# Patient Record
Sex: FEMALE | Race: WHITE | NOT HISPANIC OR LATINO | Employment: FULL TIME | URBAN - METROPOLITAN AREA
[De-identification: names, ages, dates, MRNs, and addresses within clinical notes are randomized per-mention and may not be internally consistent; named-entity substitution may affect disease eponyms.]

---

## 2017-02-10 ENCOUNTER — GYNECOLOGY VISIT (OUTPATIENT)
Dept: OBGYN | Facility: CLINIC | Age: 37
End: 2017-02-10
Payer: COMMERCIAL

## 2017-02-10 VITALS — WEIGHT: 110 LBS | DIASTOLIC BLOOD PRESSURE: 66 MMHG | SYSTOLIC BLOOD PRESSURE: 110 MMHG | BODY MASS INDEX: 20.8 KG/M2

## 2017-02-10 DIAGNOSIS — N93.8 DUB (DYSFUNCTIONAL UTERINE BLEEDING): ICD-10-CM

## 2017-02-10 DIAGNOSIS — E28.2 PCOS (POLYCYSTIC OVARIAN SYNDROME): ICD-10-CM

## 2017-02-10 PROCEDURE — 76830 TRANSVAGINAL US NON-OB: CPT | Performed by: OBSTETRICS & GYNECOLOGY

## 2017-02-10 PROCEDURE — 99213 OFFICE O/P EST LOW 20 MIN: CPT | Mod: 25 | Performed by: OBSTETRICS & GYNECOLOGY

## 2017-02-10 NOTE — PROGRESS NOTES
Felisha Pedraza,  36 y.o.  female presents today with a C/O of :oligomenorrhea. Pt   Patient's last menstrual period was 2016.       Patient with prior pregnancy ,required Dr. Jackson and IUI, Meds . Patient seen by Dr. ENGLE and referred back , but with Positive pregnancy test  Before      Subjective : Nausea/Vomiting: No:  Abdominal /pelvic cramping : No :   vaginal bleeding:Yes, some Brownish D/C       Menstrual Flow : moderate   GYN ROS:  normal menses, no abnormal bleeding, pelvic pain or discharge, no breast pain or new or enlarging lumps on self exam      Past Medical History   Diagnosis Date   • Other specified symptom associated with female genital organs    • PCOS (polycystic ovarian syndrome)    • Migraine        Past Surgical History   Procedure Laterality Date   • Other orthopedic surgery       clavical right   • Pelviscopy  4/3/2013     Performed by Shin Mayo M.D. at [facility]   • Cyst excision  4/3/2013     Performed by Shin Mayo M.D. at [facility]            C/S in [place] : FTP   Current Birth control:  none    OB History    Para Term  AB SAB TAB Ectopic Multiple Living   1 1 1       1      # Outcome Date GA Lbr Gideon/2nd Weight Sex Delivery Anes PTL Lv   1 Term 08/03/15    F CS-Unspec   Y              Allergy:      Review of patient's allergies indicates no known allergies.    Exam;    /66 mmHg  Wt 49.896 kg (110 lb)  LMP 2016  Breastfeeding? No      Lab.    No results found for this or any previous visit (from the past 336 hour(s)).  Ultrasound :      Per my Read   Transvaginal    First trimester findings: Intrauterine gestational sac seen: yes  Gestational sac summary: fetal pole seen, yolk sac seen, fetal cardiac activity, EGA: 9 weeks + 1 days  Fetal cardiac activity: present  Crown-rump length: 2.19 cm  BARBIE : 2017    Assessment:    missed/threatened   Hx of PCOS , and required IUI and  meds with last pregnancy  Possible persistent hormonal imbalance : no CLC seen     Plan:  2 weeks   Serum Progesterone level/ HCG   Doubt will need , but script for progesterone given , if levels < 15   Referral for Aye   Patient to follow up with all group members

## 2017-02-10 NOTE — MR AVS SNAPSHOT
Felisha Pedraza   2/10/2017 1:30 PM   Gynecology Visit   MRN: [id number]    Department:  [facility]   Dept Phone:  [phone]    Description:  Female : 1980   Provider:  Ron Esquivel M.D.           Reason for Visit     Other DUB      Allergies as of 2/10/2017     No Known Allergies      You were diagnosed with     DUB (dysfunctional uterine bleeding)   [036293]       PCOS (polycystic ovarian syndrome)   [458362]         Vital Signs     Blood Pressure Weight Last Menstrual Period Breastfeeding? Smoking Status       110/66 mmHg 49.896 kg (110 lb) 2016 No Former Smoker       Basic Information     Date Of Birth Sex Race Ethnicity Preferred Language    1980 Female White Non- English      Your appointments     Mar 24, 2017  1:00 PM   New OB with Sara Guzman M.D.   [facility] ([address])    [address] [place] [address] [phone]            2017  3:45 PM   OB Follow Up with Sara Guzman M.D.   [facility] ([address])    [address] [place] [address], [address]  [facility] [phone]            May 23, 2017  3:45 PM   OB Follow Up with Ron Esquivel M.D.   [facility] ([address])    [address] [place] [address], [address]  [facility] [phone]            2017  9:00 AM   OB Follow Up with Darlyn Packer M.D.   [facility] ([address])    [address] [place] [address], [address]  [facility] [id number] [phone]              Problem List              ICD-10-CM Priority Class Noted - Resolved    PCOS (polycystic ovarian syndrome) E28.2   2012 - Present    Opiate dependence (CMS-HCC) F11.20   2012 - Present    Irregular menstrual cycle N92.6   4/3/2013 - Present      Health Maintenance        Date Due Completion Dates    IMM DTaP/Tdap/Td Vaccine (1 - Tdap) 2001 ---    IMM INFLUENZA (1) 2016 ---    PAP SMEAR  12/5/2019 12/5/2016            Current Immunizations     No immunizations on file.      Below and/or attached are the medications your provider expects you to take. Review all of your home medications and newly ordered medications with your provider and/or pharmacist. Follow medication instructions as directed by your provider and/or pharmacist. Please keep your medication list with you and share with your provider. Update the information when medications are discontinued, doses are changed, or new medications (including over-the-counter products) are added; and carry medication information at all times in the event of emergency situations     Allergies:  No Known Allergies          Medications  Valid as of: February 10, 2017 -  2:49 PM    Generic Name Brand Name Tablet Size Instructions for use    Buprenorphine HCl-Naloxone HCl (FILM) Buprenorphine HCl-Naloxone HCl 8-2 MG Place 4 mg under tongue every day. DRUG ADDICTION AGENCY # [id number]    [facility] #  [id number]        Doxylamine Succinate (Sleep) (Tab) UNISOM 25 MG Take  by mouth. 1/2 tab prn insomnia        Ibuprofen (Tab) MOTRIN 800 MG Take 1 Tab by mouth every 8 hours as needed.        MetFORMIN HCl (Tab) GLUCOPHAGE 500 MG Take 1 Tab by mouth 2 times a day, with meals. Indications: Polycystic Ovary Syndrome        Norethindrone   Take  by mouth.        Prenatal Vit-Fe Fumarate-FA (Tab) PRENATAL VITAMIN PLUS LOW IRON 27-1 MG Take 1 Tab by mouth every day.        Progesterone (Suppos) Progesterone 100 MG Insert 1 Suppository in vagina 2 Times a Day.        .                 Medicines prescribed today were sent to:     [id number] - [place] - [address]    [address] [place] [address]    Phone: [phone] Fax: [phone]    Open 24 Hours?: No      Medication refill instructions:       If your prescription bottle indicates you have medication refills left, it is not necessary to call your provider’s office. Please contact your pharmacy and they will  refill your medication.    If your prescription bottle indicates you do not have any refills left, you may request refills at any time through one of the following ways: The online [employer] system (except Urgent Care), by calling your provider’s office, or by asking your pharmacy to contact your provider’s office with a refill request. Medication refills are processed only during regular business hours and may not be available until the next business day. Your provider may request additional information or to have a follow-up visit with you prior to refilling your medication.   *Please Note: Medication refills are assigned a new Rx number when refilled electronically. Your pharmacy may indicate that no refills were authorized even though a new prescription for the same medication is available at the pharmacy. Please request the medicine by name with the pharmacy before contacting your provider for a refill.        Your To Do List     Future Labs/Procedures Complete By Expires    HCG QUANTITATIVE  As directed 2/10/2018      Referral     A referral request has been sent to our patient care coordination department. Please allow 3-5 business days for us to process this request and contact you either by phone or mail. If you do not hear from us by the 5th business day, please call us at [phone].           [employer] Access Code: Activation code not generated  Current [employer] Status: Active

## 2017-02-13 ENCOUNTER — HOSPITAL ENCOUNTER (OUTPATIENT)
Dept: LAB | Facility: MEDICAL CENTER | Age: 37
End: 2017-02-13
Attending: OBSTETRICS & GYNECOLOGY
Payer: COMMERCIAL

## 2017-02-13 DIAGNOSIS — N93.8 DUB (DYSFUNCTIONAL UTERINE BLEEDING): ICD-10-CM

## 2017-02-13 LAB
B-HCG SERPL-ACNC: ABNORMAL MIU/ML (ref 0–5)
PROGEST SERPL-MCNC: 29.25 NG/ML

## 2017-02-13 PROCEDURE — 84144 ASSAY OF PROGESTERONE: CPT

## 2017-02-13 PROCEDURE — 36415 COLL VENOUS BLD VENIPUNCTURE: CPT

## 2017-02-13 PROCEDURE — 84702 CHORIONIC GONADOTROPIN TEST: CPT

## 2017-02-15 ENCOUNTER — TELEPHONE (OUTPATIENT)
Dept: OBGYN | Facility: CLINIC | Age: 37
End: 2017-02-15

## 2017-02-16 NOTE — TELEPHONE ENCOUNTER
Pt called back   Informed her of results & no need for supplemental progesterone   Pt verbalized understanding

## 2017-02-16 NOTE — TELEPHONE ENCOUNTER
----- Message from Ron Esquivel M.D. sent at 2/15/2017  9:10 AM PST -----  Levels are veery good . No need for supplemental progesterone at this time

## 2017-03-07 ENCOUNTER — HOSPITAL ENCOUNTER (OUTPATIENT)
Dept: LAB | Facility: MEDICAL CENTER | Age: 37
End: 2017-03-07
Attending: OBSTETRICS & GYNECOLOGY
Payer: COMMERCIAL

## 2017-03-07 PROCEDURE — 81507 FETAL ANEUPLOIDY TRISOM RISK: CPT

## 2017-03-07 PROCEDURE — 36415 COLL VENOUS BLD VENIPUNCTURE: CPT

## 2017-03-17 LAB
ANNOTATION COMMENT IMP: NORMAL
FET CHR X + Y ANEUP RISK PLAS.CFDNA QN: NORMAL
FET SEX US: NORMAL
FET TS 13 RISK PLAS.CFDNA QL: NORMAL
FET TS 18 RISK PLAS.CFDNA QL: NORMAL
FET TS 21 RISK PLAS.CFDNA QL: NORMAL
FETAL FRACTION Q0204: 14.5
GA (DAYS): 4 D
GA (WEEKS): 12 WK
PATHOLOGY STUDY: NORMAL
SERVICE CMNT-IMP: YES
TRIPLOIDY VAN TWIN Q0202: NORMAL

## 2017-03-24 ENCOUNTER — HOSPITAL ENCOUNTER (OUTPATIENT)
Dept: LAB | Facility: MEDICAL CENTER | Age: 37
End: 2017-03-24
Attending: OBSTETRICS & GYNECOLOGY
Payer: COMMERCIAL

## 2017-03-24 ENCOUNTER — HOSPITAL ENCOUNTER (OUTPATIENT)
Facility: MEDICAL CENTER | Age: 37
End: 2017-03-24
Attending: OBSTETRICS & GYNECOLOGY
Payer: COMMERCIAL

## 2017-03-24 ENCOUNTER — INITIAL PRENATAL (OUTPATIENT)
Dept: OBGYN | Facility: CLINIC | Age: 37
End: 2017-03-24
Payer: COMMERCIAL

## 2017-03-24 VITALS — SYSTOLIC BLOOD PRESSURE: 110 MMHG | WEIGHT: 114 LBS | BODY MASS INDEX: 21.55 KG/M2 | DIASTOLIC BLOOD PRESSURE: 62 MMHG

## 2017-03-24 DIAGNOSIS — O09.92 HIGH-RISK PREGNANCY, SECOND TRIMESTER: ICD-10-CM

## 2017-03-24 DIAGNOSIS — O09.522 AMA (ADVANCED MATERNAL AGE) MULTIGRAVIDA 35+, SECOND TRIMESTER: ICD-10-CM

## 2017-03-24 DIAGNOSIS — O98.512 HSV-2 INFECTION COMPLICATING PREGNANCY, SECOND TRIMESTER: ICD-10-CM

## 2017-03-24 DIAGNOSIS — O34.219 PREVIOUS CESAREAN DELIVERY AFFECTING PREGNANCY, ANTEPARTUM: ICD-10-CM

## 2017-03-24 DIAGNOSIS — B00.9 HSV-2 INFECTION COMPLICATING PREGNANCY, SECOND TRIMESTER: ICD-10-CM

## 2017-03-24 LAB
ABO GROUP BLD: NORMAL
APPEARANCE UR: CLEAR
BASOPHILS # BLD AUTO: 0.08 K/UL (ref 0–0.12)
BASOPHILS NFR BLD AUTO: 0.9 % (ref 0–1.8)
BILIRUB UR QL STRIP.AUTO: NEGATIVE
BLD GP AB SCN SERPL QL: NORMAL
COLOR UR AUTO: YELLOW
CULTURE IF INDICATED INDCX: NO UA CULTURE
EOSINOPHIL # BLD: 0.08 K/UL (ref 0–0.51)
EOSINOPHIL NFR BLD AUTO: 0.9 % (ref 0–6.9)
ERYTHROCYTE [DISTWIDTH] IN BLOOD BY AUTOMATED COUNT: 42.5 FL (ref 35.9–50)
GLUCOSE UR STRIP.AUTO-MCNC: NEGATIVE MG/DL
HBV SURFACE AG SERPL QL IA: NEGATIVE
HCT VFR BLD AUTO: 36.2 % (ref 37–47)
HGB BLD-MCNC: 11.9 G/DL (ref 12–16)
HIV 1+2 AB+HIV1 P24 AG SERPL QL IA: NON REACTIVE
IMM GRANULOCYTES # BLD AUTO: 0.03 K/UL (ref 0–0.11)
IMM GRANULOCYTES NFR BLD AUTO: 0.3 % (ref 0–0.9)
KETONES UR STRIP.AUTO-MCNC: NEGATIVE MG/DL
LEUKOCYTE ESTERASE UR QL STRIP.AUTO: NEGATIVE
LYMPHOCYTES # BLD: 1.24 K/UL (ref 1–4.8)
LYMPHOCYTES NFR BLD AUTO: 14.5 % (ref 22–41)
MCH RBC QN AUTO: 29.5 PG (ref 27–33)
MCHC RBC AUTO-ENTMCNC: 32.9 G/DL (ref 33.6–35)
MCV RBC AUTO: 89.6 FL (ref 81.4–97.8)
MICRO URNS: ABNORMAL
MONOCYTES # BLD: 0.44 K/UL (ref 0–0.85)
MONOCYTES NFR BLD AUTO: 5.1 % (ref 0–13.4)
NEUTROPHILS # BLD: 6.71 K/UL (ref 2–7.15)
NEUTROPHILS NFR BLD AUTO: 78.3 % (ref 44–72)
NITRITE UR QL STRIP.AUTO: NEGATIVE
NRBC # BLD AUTO: 0 K/UL
NRBC BLD-RTO: 0 /100 WBC
PH UR: 6 [PH]
PLATELET # BLD AUTO: 182 K/UL (ref 164–446)
PMV BLD AUTO: 12.9 FL (ref 9–12.9)
PROT UR QL STRIP: NEGATIVE MG/DL
RBC # BLD AUTO: 4.04 M/UL (ref 4.2–5.4)
RBC UR QL AUTO: NEGATIVE
RH BLD: NORMAL
RUBV IGG SERPL IA-ACNC: 225.5 IU/ML
SP GR UR STRIP.AUTO: 1.02
TREPONEMA PALLIDUM IGG+IGM AB [PRESENCE] IN SERUM OR PLASMA BY IMMUNOASSAY: NON REACTIVE
WBC # BLD AUTO: 8.6 K/UL (ref 4.8–10.8)

## 2017-03-24 PROCEDURE — 86901 BLOOD TYPING SEROLOGIC RH(D): CPT

## 2017-03-24 PROCEDURE — 86762 RUBELLA ANTIBODY: CPT

## 2017-03-24 PROCEDURE — 86900 BLOOD TYPING SEROLOGIC ABO: CPT

## 2017-03-24 PROCEDURE — 59402 PR NEW OB HIGH RISK: CPT | Performed by: OBSTETRICS & GYNECOLOGY

## 2017-03-24 PROCEDURE — 87591 N.GONORRHOEAE DNA AMP PROB: CPT

## 2017-03-24 PROCEDURE — 36415 COLL VENOUS BLD VENIPUNCTURE: CPT

## 2017-03-24 PROCEDURE — 85025 COMPLETE CBC W/AUTO DIFF WBC: CPT

## 2017-03-24 PROCEDURE — 87340 HEPATITIS B SURFACE AG IA: CPT

## 2017-03-24 PROCEDURE — 87389 HIV-1 AG W/HIV-1&-2 AB AG IA: CPT

## 2017-03-24 PROCEDURE — 86780 TREPONEMA PALLIDUM: CPT

## 2017-03-24 PROCEDURE — 87591 N.GONORRHOEAE DNA AMP PROB: CPT | Mod: 91

## 2017-03-24 PROCEDURE — 87491 CHLMYD TRACH DNA AMP PROBE: CPT

## 2017-03-24 PROCEDURE — 81003 URINALYSIS AUTO W/O SCOPE: CPT

## 2017-03-24 PROCEDURE — 86850 RBC ANTIBODY SCREEN: CPT

## 2017-03-24 PROCEDURE — 87491 CHLMYD TRACH DNA AMP PROBE: CPT | Mod: 91

## 2017-03-24 RX ORDER — ACYCLOVIR 400 MG/1
400 TABLET ORAL 3 TIMES DAILY
Qty: 30 TAB | Refills: 0 | Status: SHIPPED | OUTPATIENT
Start: 2017-03-24 | End: 2018-03-26 | Stop reason: SDUPTHER

## 2017-03-24 NOTE — MR AVS SNAPSHOT
Felisha Pedraza   3/24/2017 1:00 PM   Initial Prenatal   MRN: [id number]    Department:  [facility]   Dept Phone:  [phone]    Description:  Female : 1980   Provider:  Sara Guzman M.D.           Allergies as of 3/24/2017     No Known Allergies      Vital Signs     Blood Pressure Weight Last Menstrual Period Smoking Status          110/62 mmHg 51.71 kg (114 lb) 2016 Former Smoker        Basic Information     Date Of Birth Sex Race Ethnicity Preferred Language    1980 Female White Non- English      Your appointments     2017  3:45 PM   OB Follow Up with Sara Guzman M.D.   [facility] ([address])    [address] [place] [phone]            May 23, 2017  3:45 PM   OB Follow Up with Ron Esquivel M.D.   [facility] ([address])    [address] [place] [address], [address] [phone]            2017  9:00 AM   OB Follow Up with Darlyn Packer M.D.   [facility] ([address])    [address] [place] [address] [phone]              Problem List              ICD-10-CM Priority Class Noted - Resolved    PCOS (polycystic ovarian syndrome) E28.2   2012 - Present      Health Maintenance        Date Due Completion Dates    IMM DTaP/Tdap/Td Vaccine (1 - Tdap) 2001 ---    IMM INFLUENZA (1) 2016 ---    PAP SMEAR 2019            Current Immunizations     No immunizations on file.      Below and/or attached are the medications your provider expects you to take. Review all of your home medications and newly ordered medications with your provider and/or pharmacist. Follow medication instructions as directed by your provider and/or pharmacist. Please keep your medication list with you and share with your provider. Update the information when medications are discontinued, doses are changed,  or new medications (including over-the-counter products) are added; and carry medication information at all times in the event of emergency situations     Allergies:  No Known Allergies          Medications  Valid as of: March 24, 2017 -  1:26 PM    Generic Name Brand Name Tablet Size Instructions for use    Buprenorphine HCl-Naloxone HCl (FILM) Buprenorphine HCl-Naloxone HCl 8-2 MG Place 4 mg under tongue every day. DRUG ADDICTION AGENCY # [id number]    [facility]  [id number]        Doxylamine Succinate (Sleep) (Tab) UNISOM 25 MG Take  by mouth. 1/2 tab prn insomnia        Ibuprofen (Tab) MOTRIN 800 MG Take 1 Tab by mouth every 8 hours as needed.        MetFORMIN HCl (Tab) GLUCOPHAGE 500 MG Take 1 Tab by mouth 2 times a day, with meals. Indications: Polycystic Ovary Syndrome        Norethindrone   Take  by mouth.        Prenatal Vit-Fe Fumarate-FA (Tab) PRENATAL VITAMIN PLUS LOW IRON 27-1 MG Take 1 Tab by mouth every day.        Progesterone (Suppos) Progesterone 100 MG Insert 1 Suppository in vagina 2 Times a Day.        .                 Medicines prescribed today were sent to:     [id number] - [place] - [address]    [address] [facility] [address]    Phone: [phone] Fax: [phone]    Open 24 Hours?: No      Medication refill instructions:       If your prescription bottle indicates you have medication refills left, it is not necessary to call your provider’s office. Please contact your pharmacy and they will refill your medication.    If your prescription bottle indicates you do not have any refills left, you may request refills at any time through one of the following ways: The online [employer] system (except Urgent Care), by calling your provider’s office, or by asking your pharmacy to contact your provider’s office with a refill request. Medication refills are processed only during regular business hours and may not be available until the next business day. Your provider may request additional  information or to have a follow-up visit with you prior to refilling your medication.   *Please Note: Medication refills are assigned a new Rx number when refilled electronically. Your pharmacy may indicate that no refills were authorized even though a new prescription for the same medication is available at the pharmacy. Please request the medicine by name with the pharmacy before contacting your provider for a refill.        Other Notes About Your Plan     Early pregnancy : Unassigned , but will be [facility] group patient            MyChart Access Code: Activation code not generated  Current MyChart Status: Active

## 2017-03-24 NOTE — Clinical Note
2017      Felisha Pedraza is currently pregnant and being cared for by [facility].     She is medically cleared for:    1. Dental exams and routine cleaning  2. Tooth fillings and extractions as needed  3. Antibiotic therapy as appropriate  4. Local anesthesia    Patient may be administered the followin% Lidocaine with 1:100,000 Epinephrine  4% Septocaine with 1:100,000 Epinephrine  Nitrous Oxide  A narcotic or non-narcotic pain medication  An antibiotic such as penicillin or clindamycin    NO TETRACYCLINE and NO CODEINE        Thank you,          Sara Guzman M.D.    Electronically Signed

## 2017-03-24 NOTE — PROGRESS NOTES
Subjective:      Felisha Pedraza is a 36 y.o. female who presents with No chief complaint on file.        CC: NOB    HPI 35 yo  @ 15+1/7 wks by lmp 16, BARBIE, 9/15/17 consistent with 9 +1/7 wk sono presents for NOB exam.  No complaints today.       complications:  1. AMA - seen by Dr. Griffiths, Cell free DNA negative  2. Hx of  - desires repeat  3. Hx of HSV with frequent outbreaks  4. PCOS - on metformin 500 mg po bid  5. Hx of opioid dependence - clean for 5 years  6. Maternal Antoine Sach's carrier,  negative.    ROS       Objective:     /62 mmHg  Wt 51.71 kg (114 lb)  LMP 2016     Physical Exam     See prenatal physical       Assessment/Plan:    High-risk pregnancy, second trimester    - PRENATAL PANEL 3+HIV+UACXI; Future  - CHLAMYDIA/GC PCR URINE OR SWAB; Future (Pap done already, normal)  Had flu shot.  Continue PNV.  CF negative.  Has fetal survey scheduled with Dr. Griffiths.    1. AMA - seen by Dr. Griffiths, Cell free DNA negative  2. Hx of  - desires repeat  3. Hx of HSV with frequent outbreaks - pt requests acyclovir for standby.    4. PCOS - on metformin 500 mg po bid  5. Hx of opioid dependence - clean for 5 years  6. Maternal Antoine Sach's carrier,  negative.    F/U 4 weeks.

## 2017-03-24 NOTE — PROGRESS NOTES
Pt here for NOB. No fetal movement yet. Denies LOF, VB.  Aye 3/7- f/u sched for full anatomy scan  NIPT done  Pap 12/2016 WNL- GC/CT today  Need PNP  Pt requesting dental clearance

## 2017-03-25 LAB
C TRACH DNA GENITAL QL NAA+PROBE: NEGATIVE
C TRACH DNA GENITAL QL NAA+PROBE: NEGATIVE
N GONORRHOEA DNA GENITAL QL NAA+PROBE: NEGATIVE
N GONORRHOEA DNA GENITAL QL NAA+PROBE: NEGATIVE
SPECIMEN SOURCE: NORMAL
SPECIMEN SOURCE: NORMAL

## 2017-04-25 ENCOUNTER — ROUTINE PRENATAL (OUTPATIENT)
Dept: OBGYN | Facility: CLINIC | Age: 37
End: 2017-04-25
Payer: COMMERCIAL

## 2017-04-25 VITALS — DIASTOLIC BLOOD PRESSURE: 62 MMHG | SYSTOLIC BLOOD PRESSURE: 108 MMHG | WEIGHT: 119 LBS | BODY MASS INDEX: 22.5 KG/M2

## 2017-04-25 DIAGNOSIS — E28.2 PCOS (POLYCYSTIC OVARIAN SYNDROME): ICD-10-CM

## 2017-04-25 DIAGNOSIS — O09.522 AMA (ADVANCED MATERNAL AGE) MULTIGRAVIDA 35+, SECOND TRIMESTER: ICD-10-CM

## 2017-04-25 DIAGNOSIS — B00.9 HSV INFECTION: ICD-10-CM

## 2017-04-25 DIAGNOSIS — O34.219 PREVIOUS CESAREAN SECTION COMPLICATING PREGNANCY: ICD-10-CM

## 2017-04-25 PROBLEM — O09.529 AMA (ADVANCED MATERNAL AGE) MULTIGRAVIDA 35+: Status: ACTIVE | Noted: 2017-04-25

## 2017-04-25 PROCEDURE — 90040 PR PRENATAL FOLLOW UP: CPT | Performed by: OBSTETRICS & GYNECOLOGY

## 2017-04-25 NOTE — PROGRESS NOTES
Pt here for OB F/V. Good fetal movement. Denies LOF, VB.  GC NEG  PNP done  Aye report 4/13 in [place]  [place] dominga sweeney

## 2017-04-25 NOTE — PROGRESS NOTES
"36 y.o.  19w4d The patient is here for routine obstetrical followup. She reports good fetal movement. She denies vaginal bleeding, or leaking of fluid.  C/O intermittent \"[place]\" contractions - 1-2 per day.    The patient's pregnancy is complicated by   Patient Active Problem List    Diagnosis Date Noted   • AMA (advanced maternal age) multigravida 35+ 2017   • Previous  section complicating pregnancy 2017   • HSV infection 2017   • PCOS (polycystic ovarian syndrome) 2012     Fetal survey and prenatal labs reviewed - normal.    Followup in 4 weeks   labor precautions were discussed with patient    "

## 2017-04-25 NOTE — MR AVS SNAPSHOT
Felisha Pedraza   2017 3:45 PM   Routine Prenatal   MRN: [id number]    Department:  [facility]   Dept Phone:  [phone]    Description:  Female : 1980   Provider:  Sara Guzman M.D.           Allergies as of 2017     No Known Allergies      Vital Signs     Blood Pressure Weight Last Menstrual Period Smoking Status          108/62 mmHg 53.978 kg (119 lb) 2016 Former Smoker        Basic Information     Date Of Birth Sex Race Ethnicity Preferred Language    1980 Female White Non- English      Your appointments     May 23, 2017  3:45 PM   OB Follow Up with Ron Esquivel M.D.   [facility] ([address])    [address] [place] [address] [phone]            2017  9:00 AM   OB Follow Up with Darlyn Packer M.D.   [facility] ([address])    [address] [place] [address] [id number] [phone]              Problem List              ICD-10-CM Priority Class Noted - Resolved    PCOS (polycystic ovarian syndrome) E28.2   2012 - Present      Health Maintenance        Date Due Completion Dates    IMM DTaP/Tdap/Td Vaccine (1 - Tdap) 2001 ---    PAP SMEAR 2019            Current Immunizations     No immunizations on file.      Below and/or attached are the medications your provider expects you to take. Review all of your home medications and newly ordered medications with your provider and/or pharmacist. Follow medication instructions as directed by your provider and/or pharmacist. Please keep your medication list with you and share with your provider. Update the information when medications are discontinued, doses are changed, or new medications (including over-the-counter products) are added; and carry medication information at all times in the event of emergency situations     Allergies:  No Known Allergies          Medications  Valid as of:  April 25, 2017 -  4:10 PM    Generic Name Brand Name Tablet Size Instructions for use    Acyclovir (Tab) ZOVIRAX 400 MG Take 1 Tab by mouth 3 times a day.        Buprenorphine HCl-Naloxone HCl (FILM) Buprenorphine HCl-Naloxone HCl 8-2 MG Place 4 mg under tongue every day. DRUG ADDICTION AGENCY # [id number]    [facility] #  [id number]        Doxylamine Succinate (Sleep) (Tab) UNISOM 25 MG Take  by mouth. 1/2 tab prn insomnia        Ibuprofen (Tab) MOTRIN 800 MG Take 1 Tab by mouth every 8 hours as needed.        MetFORMIN HCl (Tab) GLUCOPHAGE 500 MG Take 1 Tab by mouth 2 times a day, with meals. Indications: Polycystic Ovary Syndrome        Norethindrone   Take  by mouth.        Prenatal Vit-Fe Fumarate-FA (Tab) PRENATAL VITAMIN PLUS LOW IRON 27-1 MG Take 1 Tab by mouth every day.        Progesterone (Suppos) Progesterone 100 MG Insert 1 Suppository in vagina 2 Times a Day.        .                 Medicines prescribed today were sent to:     [id number] - [place], [place] - [address]    [address] [place] [address]    Phone: [phone] Fax: [phone]    Open 24 Hours?: No      Medication refill instructions:       If your prescription bottle indicates you have medication refills left, it is not necessary to call your provider’s office. Please contact your pharmacy and they will refill your medication.    If your prescription bottle indicates you do not have any refills left, you may request refills at any time through one of the following ways: The online [employer] system (except Urgent Care), by calling your provider’s office, or by asking your pharmacy to contact your provider’s office with a refill request. Medication refills are processed only during regular business hours and may not be available until the next business day. Your provider may request additional information or to have a follow-up visit with you prior to refilling your medication.   *Please Note: Medication refills are assigned a new Rx number when  refilled electronically. Your pharmacy may indicate that no refills were authorized even though a new prescription for the same medication is available at the pharmacy. Please request the medicine by name with the pharmacy before contacting your provider for a refill.        Other Notes About Your Plan     Early pregnancy : Unassigned , but will be [facility] group patient            MyChart Access Code: Activation code not generated  Current MyChart Status: Active

## 2017-06-20 ENCOUNTER — HOSPITAL ENCOUNTER (OUTPATIENT)
Dept: LAB | Facility: MEDICAL CENTER | Age: 37
End: 2017-06-20
Attending: OBSTETRICS & GYNECOLOGY
Payer: COMMERCIAL

## 2017-06-20 ENCOUNTER — ROUTINE PRENATAL (OUTPATIENT)
Dept: OBGYN | Facility: CLINIC | Age: 37
End: 2017-06-20
Payer: COMMERCIAL

## 2017-06-20 VITALS — BODY MASS INDEX: 23.82 KG/M2 | WEIGHT: 126 LBS | DIASTOLIC BLOOD PRESSURE: 66 MMHG | SYSTOLIC BLOOD PRESSURE: 110 MMHG

## 2017-06-20 DIAGNOSIS — O34.219 PREVIOUS CESAREAN SECTION COMPLICATING PREGNANCY: ICD-10-CM

## 2017-06-20 DIAGNOSIS — Z34.83 PRENATAL CARE, SUBSEQUENT PREGNANCY, THIRD TRIMESTER: ICD-10-CM

## 2017-06-20 LAB
GLUCOSE 1H P 50 G GLC PO SERPL-MCNC: 75 MG/DL (ref 70–139)
HCT VFR BLD AUTO: 35.9 % (ref 37–47)
HGB BLD-MCNC: 11.6 G/DL (ref 12–16)
TREPONEMA PALLIDUM IGG+IGM AB [PRESENCE] IN SERUM OR PLASMA BY IMMUNOASSAY: NON REACTIVE

## 2017-06-20 PROCEDURE — 85018 HEMOGLOBIN: CPT

## 2017-06-20 PROCEDURE — 85014 HEMATOCRIT: CPT

## 2017-06-20 PROCEDURE — 36415 COLL VENOUS BLD VENIPUNCTURE: CPT

## 2017-06-20 PROCEDURE — 86780 TREPONEMA PALLIDUM: CPT

## 2017-06-20 PROCEDURE — 90040 PR PRENATAL FOLLOW UP: CPT | Performed by: OBSTETRICS & GYNECOLOGY

## 2017-06-20 PROCEDURE — 82950 GLUCOSE TEST: CPT

## 2017-06-20 NOTE — MR AVS SNAPSHOT
Felisha Pedraza   2017 9:00 AM   Routine Prenatal   MRN: [id number]    Department:  [facility]   Dept Phone:  [phone]    Description:  Female : 1980   Provider:  Darlyn Packer M.D.           Allergies as of 2017     No Known Allergies      You were diagnosed with     Prenatal care, subsequent pregnancy, third trimester   [206431]       Previous  section complicating pregnancy   [748437]         Vital Signs     Blood Pressure Weight Last Menstrual Period Smoking Status          110/66 mmHg 57.153 kg (126 lb) 2016 Former Smoker        Basic Information     Date Of Birth Sex Race Ethnicity Preferred Language    1980 Female White Non- English      Your appointments     2017  3:00 PM   OB Follow Up with Darlyn Packer M.D.   [facility] ([address])    [address] [place] [address], [address]  [facility] [id number] [phone]            2017 10:30 AM   OB Follow Up with Clifton Bloom M.D.   [facility] ([address])    [address] [place] [address], [address]  [facility] [id number] [phone]              Problem List              ICD-10-CM Priority Class Noted - Resolved    PCOS (polycystic ovarian syndrome) E28.2   2012 - Present    AMA (advanced maternal age) multigravida 35+ O09.529   2017 - Present    Previous  section complicating pregnancy O34.219   2017 - Present    HSV infection B00.9   2017 - Present      Health Maintenance        Date Due Completion Dates    IMM DTaP/Tdap/Td Vaccine (1 - Tdap) 2001 ---    PAP SMEAR 2019            Current Immunizations     No immunizations on file.      Below and/or attached are the medications your provider expects you to take. Review all of your home medications and newly ordered medications with your provider and/or pharmacist. Follow medication instructions as directed by your provider and/or  pharmacist. Please keep your medication list with you and share with your provider. Update the information when medications are discontinued, doses are changed, or new medications (including over-the-counter products) are added; and carry medication information at all times in the event of emergency situations     Allergies:  No Known Allergies          Medications  Valid as of: June 20, 2017 - 11:07 AM    Generic Name Brand Name Tablet Size Instructions for use    Acyclovir (Tab) ZOVIRAX 400 MG Take 1 Tab by mouth 3 times a day.        Buprenorphine HCl-Naloxone HCl (FILM) Buprenorphine HCl-Naloxone HCl 8-2 MG Place 4 mg under tongue every day. DRUG ADDICTION AGENCY # [id number]    [facility] #  [id number]        Doxylamine Succinate (Sleep) (Tab) UNISOM 25 MG Take  by mouth. 1/2 tab prn insomnia        Ibuprofen (Tab) MOTRIN 800 MG Take 1 Tab by mouth every 8 hours as needed.        MetFORMIN HCl (Tab) GLUCOPHAGE 500 MG Take 1 Tab by mouth 2 times a day, with meals. Indications: Polycystic Ovary Syndrome        Norethindrone   Take  by mouth.        Prenatal Vit-Fe Fumarate-FA (Tab) PRENATAL VITAMIN PLUS LOW IRON 27-1 MG Take 1 Tab by mouth every day.        Progesterone (Suppos) Progesterone 100 MG Insert 1 Suppository in vagina 2 Times a Day.        .                 Medicines prescribed today were sent to:     [id number] - [place] - [address]    [address] [facility] [address]    Phone: [phone] Fax: [phone]    Open 24 Hours?: No      Medication refill instructions:       If your prescription bottle indicates you have medication refills left, it is not necessary to call your provider’s office. Please contact your pharmacy and they will refill your medication.    If your prescription bottle indicates you do not have any refills left, you may request refills at any time through one of the following ways: The online [employer] system (except Urgent Care), by calling your provider’s office, or by asking your  pharmacy to contact your provider’s office with a refill request. Medication refills are processed only during regular business hours and may not be available until the next business day. Your provider may request additional information or to have a follow-up visit with you prior to refilling your medication.   *Please Note: Medication refills are assigned a new Rx number when refilled electronically. Your pharmacy may indicate that no refills were authorized even though a new prescription for the same medication is available at the pharmacy. Please request the medicine by name with the pharmacy before contacting your provider for a refill.        Your To Do List     Future Labs/Procedures Complete By Expires    GLUCOSE 1HR GESTATIONAL  As directed 6/21/2018    HCT  As directed 6/21/2018    HGB  As directed 6/21/2018    T.PALLIDUM AB EIA  As directed 6/21/2018      Other Notes About Your Plan     Early pregnancy : Unassigned , but will be [facility] group patient            MyChart Access Code: Activation code not generated  Current MyChart Status: Active

## 2017-06-20 NOTE — Clinical Note
"Count Your Baby's Movements  Another step to a healthy delivery        How Many Weeks Pregnant? 27 wks 4 days    Date to Begin Countin17              How to use this chart    One way for your physician to keep track of your baby's health is by knowing how often the baby moves (or \"kicks\") in your womb.  You can help your physician to do this by using this chart every day.    Every day, you should see how many hours it takes for your baby to move 10 times.  Start in the morning, as soon as you get up.    · First, write down the time your baby moves until you get to 10.  · Check off one box every time your baby moves until you get to 10.  · Write down the time you finished counting in the last column.  · Total how long it took to count up all 10 movements.  · Finally, fill in the box that shows how long this took.  After counting 10 movements, you no longer have to count any more that day.  The next morning, just start counting again as soon as you get up.    What should you call a \"movement\"?  It is hard to say, because it will feel different from one mother to another and from one pregnancy to the next.  The important thing is that you count the movements the same way throughout your pregnancy.  If you have more questions, you should ask your physician.    Count carefully every day!  SAMPLE:  Week 28    How many hours did it take to feel 10 movements?       Start  Time     1     2     3     4     5     6     7     8     9     10   Finish Time   Mon 8:20 ·  ·  ·  ·  ·  ·  ·  ·  ·  ·  11:40                  Sat               Sun                 IMPORTANT: You should contact your physician if it takes more than two hours for you to feel 10 movements.  Each morning, write down the time and start to count the movements of your baby.  Keep track by checking off one box every time you feel one movement.  When you have felt 10 \"kicks\", write down the time you " finished counting in the last column.  Then fill in the   box (over the check kiersten) for the number of hours it took.  Be sure to read the complete instructions on the previous page.

## 2017-06-20 NOTE — PROGRESS NOTES
Felisha Pedraza is a 36 y.o.  at 27w4d here today for obstetrical visit.  Patient is without complaints.    She reports good fetal movement.  She denies vaginal bleeding.  She denies rupture of membranes.  She denies contractions.     has PCOS (polycystic ovarian syndrome); AMA (advanced maternal age) multigravida 35+; Previous  section complicating pregnancy; and HSV infection on her problem list.    Patient will have repeat  at term  Considering tubal ligation will discuss again at next visit    A/P IUP at 27w4d  AFP done  1 hour glucola ordered  Rhogam  pos  GBS     F/U in 2 weeks

## 2017-06-30 ENCOUNTER — ROUTINE PRENATAL (OUTPATIENT)
Dept: OBGYN | Facility: CLINIC | Age: 37
End: 2017-06-30
Payer: COMMERCIAL

## 2017-06-30 VITALS — DIASTOLIC BLOOD PRESSURE: 66 MMHG | SYSTOLIC BLOOD PRESSURE: 112 MMHG | BODY MASS INDEX: 24.2 KG/M2 | WEIGHT: 128 LBS

## 2017-06-30 DIAGNOSIS — O34.219 PREVIOUS CESAREAN SECTION COMPLICATING PREGNANCY: ICD-10-CM

## 2017-06-30 PROCEDURE — 90040 PR PRENATAL FOLLOW UP: CPT | Performed by: OBSTETRICS & GYNECOLOGY

## 2017-06-30 NOTE — PROGRESS NOTES
Felisha Pedraza is a 36 y.o.  at 29w0d here today for obstetrical visit.  Patient is without complaints.    She reports good fetal movement.  She denies vaginal bleeding.  She denies rupture of membranes.  She denies contractions.     has PCOS (polycystic ovarian syndrome); AMA (advanced maternal age) multigravida 35+; Previous  section complicating pregnancy; and HSV infection on her problem list.    F/u with amelia for growth scan     A/P IUP at 29w0d  AFP done  1 hour glucola done  Rhogam a pos  GBS     F/U in 2 weeks

## 2017-06-30 NOTE — MR AVS SNAPSHOT
Felisha Pedraza   2017 3:00 PM   Routine Prenatal   MRN: [id number]    Department:  [facility]   Dept Phone:  [phone]    Description:  Female : 1980   Provider:  Darlyn Packer M.D.           Allergies as of 2017     No Known Allergies      You were diagnosed with     Previous  section complicating pregnancy   [325126]         Vital Signs     Blood Pressure Weight Last Menstrual Period Smoking Status          112/66 mmHg 58.06 kg (128 lb) 2016 Former Smoker        Basic Information     Date Of Birth Sex Race Ethnicity Preferred Language    1980 Female White Non- English      Your appointments     2017 10:30 AM   OB Follow Up with Clifton Bloom M.D.   [facility] [address], [address]  [facility] [address]   [phone]              Problem List              ICD-10-CM Priority Class Noted - Resolved    PCOS (polycystic ovarian syndrome) E28.2   2012 - Present    AMA (advanced maternal age) multigravida 35+ O09.529   2017 - Present    Previous  section complicating pregnancy O34.219   2017 - Present    HSV infection B00.9   2017 - Present      Health Maintenance        Date Due Completion Dates    IMM DTaP/Tdap/Td Vaccine (1 - Tdap) 2001 ---    PAP SMEAR 2019            Current Immunizations     No immunizations on file.      Below and/or attached are the medications your provider expects you to take. Review all of your home medications and newly ordered medications with your provider and/or pharmacist. Follow medication instructions as directed by your provider and/or pharmacist. Please keep your medication list with you and share with your provider. Update the information when medications are discontinued, doses are changed, or new medications (including over-the-counter products) are added; and carry medication information at all times in  the event of emergency situations     Allergies:  No Known Allergies          Medications  Valid as of: June 30, 2017 -  3:59 PM    Generic Name Brand Name Tablet Size Instructions for use    Acyclovir (Tab) ZOVIRAX 400 MG Take 1 Tab by mouth 3 times a day.        Buprenorphine HCl-Naloxone HCl (FILM) Buprenorphine HCl-Naloxone HCl 8-2 MG Place 4 mg under tongue every day. DRUG ADDICTION AGENCY # [id number]    [facility] #  [id number]        Doxylamine Succinate (Sleep) (Tab) UNISOM 25 MG Take  by mouth. 1/2 tab prn insomnia        Ibuprofen (Tab) MOTRIN 800 MG Take 1 Tab by mouth every 8 hours as needed.        MetFORMIN HCl (Tab) GLUCOPHAGE 500 MG Take 1 Tab by mouth 2 times a day, with meals. Indications: Polycystic Ovary Syndrome        Norethindrone   Take  by mouth.        Prenatal Vit-Fe Fumarate-FA (Tab) PRENATAL VITAMIN PLUS LOW IRON 27-1 MG Take 1 Tab by mouth every day.        Progesterone (Suppos) Progesterone 100 MG Insert 1 Suppository in vagina 2 Times a Day.        .                 Medicines prescribed today were sent to:     [id number] - [place] - [address]    [address] [facility] [address]    Phone: [phone] Fax: [phone]    Open 24 Hours?: No      Medication refill instructions:       If your prescription bottle indicates you have medication refills left, it is not necessary to call your provider’s office. Please contact your pharmacy and they will refill your medication.    If your prescription bottle indicates you do not have any refills left, you may request refills at any time through one of the following ways: The online [employer] system (except Urgent Care), by calling your provider’s office, or by asking your pharmacy to contact your provider’s office with a refill request. Medication refills are processed only during regular business hours and may not be available until the next business day. Your provider may request additional information or to have a follow-up visit with you  prior to refilling your medication.   *Please Note: Medication refills are assigned a new Rx number when refilled electronically. Your pharmacy may indicate that no refills were authorized even though a new prescription for the same medication is available at the pharmacy. Please request the medicine by name with the pharmacy before contacting your provider for a refill.        Other Notes About Your Plan     Early pregnancy : Unassigned , but will be [facility] group patient            MyChart Access Code: Activation code not generated  Current MyChart Status: Active

## 2017-07-18 ENCOUNTER — ROUTINE PRENATAL (OUTPATIENT)
Dept: OBGYN | Facility: CLINIC | Age: 37
End: 2017-07-18
Payer: COMMERCIAL

## 2017-07-18 VITALS — DIASTOLIC BLOOD PRESSURE: 62 MMHG | WEIGHT: 129 LBS | BODY MASS INDEX: 24.39 KG/M2 | SYSTOLIC BLOOD PRESSURE: 102 MMHG

## 2017-07-18 DIAGNOSIS — O34.219 PREVIOUS CESAREAN SECTION COMPLICATING PREGNANCY: ICD-10-CM

## 2017-07-18 PROCEDURE — 90040 PR PRENATAL FOLLOW UP: CPT | Performed by: OBSTETRICS & GYNECOLOGY

## 2017-07-18 NOTE — MR AVS SNAPSHOT
Felisha Pedraza   2017 10:30 AM   Routine Prenatal   MRN: [id number]    Department:  [facility]   Dept Phone:  [phone]    Description:  Female : 1980   Provider:  Clifton Bloom M.D.           Allergies as of 2017     No Known Allergies      You were diagnosed with     Previous  section complicating pregnancy   [179566]         Vital Signs     Blood Pressure Weight Last Menstrual Period Smoking Status          102/62 mmHg 58.514 kg (129 lb) 2016 Former Smoker        Basic Information     Date Of Birth Sex Race Ethnicity Preferred Language    1980 Female White Non- English      Your appointments     Aug 04, 2017  3:45 PM   OB Follow Up with Sara Guzman M.D.   [address] [id number] [phone]            Aug 18, 2017  3:30 PM   OB Follow Up with Darlyn Packer M.D.   [address] [id number] [phone]            Sep 01, 2017  3:30 PM   OB Follow Up with Ron Esquivel M.D.   [address] [id number] [phone]            Sep 05, 2017 10:45 AM   OB Follow Up with Sara Guzman M.D.   [address] [id number] [phone]              Problem List              ICD-10-CM Priority Class Noted - Resolved    PCOS (polycystic ovarian syndrome) E28.2   2012 - Present    AMA (advanced maternal age) multigravida 35+ O09.529   2017 - Present    Previous  section complicating pregnancy O34.219   2017 - Present    HSV infection B00.9   2017 - Present      Health Maintenance        Date Due Completion Dates    IMM DTaP/Tdap/Td Vaccine (1 - Tdap) 2001 ---    IMM INFLUENZA (1) 2017 ---    PAP SMEAR  12/5/2019 12/5/2016            Current Immunizations     No immunizations on file.      Below and/or attached are the medications your provider expects you to take. Review all of your home medications and newly ordered medications with your provider and/or pharmacist. Follow medication instructions as directed by your provider and/or pharmacist. Please keep your medication list with you and share with your provider. Update the information when medications are discontinued, doses are changed, or new medications (including over-the-counter products) are added; and carry medication information at all times in the event of emergency situations     Allergies:  No Known Allergies          Medications  Valid as of: July 18, 2017 - 11:11 AM    Generic Name Brand Name Tablet Size Instructions for use    Acyclovir (Tab) ZOVIRAX 400 MG Take 1 Tab by mouth 3 times a day.        Buprenorphine HCl-Naloxone HCl (FILM) Buprenorphine HCl-Naloxone HCl 8-2 MG Place 4 mg under tongue every day. DRUG ADDICTION AGENCY # [id number]    [facility] #  [id number]        Doxylamine Succinate (Sleep) (Tab) UNISOM 25 MG Take  by mouth. 1/2 tab prn insomnia        Ibuprofen (Tab) MOTRIN 800 MG Take 1 Tab by mouth every 8 hours as needed.        MetFORMIN HCl (Tab) GLUCOPHAGE 500 MG Take 1 Tab by mouth 2 times a day, with meals. Indications: Polycystic Ovary Syndrome        Norethindrone   Take  by mouth.        Prenatal Vit-Fe Fumarate-FA (Tab) PRENATAL VITAMIN PLUS LOW IRON 27-1 MG Take 1 Tab by mouth every day.        Progesterone (Suppos) Progesterone 100 MG Insert 1 Suppository in vagina 2 Times a Day.        .                 Medicines prescribed today were sent to:     [id number] - [place] - [address]    [address] [place] [address]    Phone: [phone] Fax: [phone]    Open 24 Hours?: No      Medication refill instructions:       If your prescription bottle indicates you have medication refills left, it is not necessary to call  your provider’s office. Please contact your pharmacy and they will refill your medication.    If your prescription bottle indicates you do not have any refills left, you may request refills at any time through one of the following ways: The online [employer] system (except Urgent Care), by calling your provider’s office, or by asking your pharmacy to contact your provider’s office with a refill request. Medication refills are processed only during regular business hours and may not be available until the next business day. Your provider may request additional information or to have a follow-up visit with you prior to refilling your medication.   *Please Note: Medication refills are assigned a new Rx number when refilled electronically. Your pharmacy may indicate that no refills were authorized even though a new prescription for the same medication is available at the pharmacy. Please request the medicine by name with the pharmacy before contacting your provider for a refill.        Other Notes About Your Plan     Early pregnancy : Unassigned , but will be [facility] group patient   Patient is being followed by Dr. Griffiths possibly for IUGR, most recent scan not available at this time           [employer] Access Code: Activation code not generated  Current [employer] Status: Active

## 2017-07-18 NOTE — PROGRESS NOTES
OB Followup;    31w4d    Patient Active Problem List    Diagnosis Date Noted   • AMA (advanced maternal age) multigravida 35+ 2017   • Previous  section complicating pregnancy 2017   • HSV infection 2017   • PCOS (polycystic ovarian syndrome) 2012       Filed Vitals:    17 1046   BP: 102/62   Weight: 58.514 kg (129 lb)       Patient presents for followup of OB care. Currently doing well . Good fetal movement no leakage of fluid no contractions or vaginal bleeding        Size equals dates, normal fetal heart rate      Labs; growth scan with Dr. Griffiths shows infant growing at 32nd percentile for growth-left Aye states that since growing on SGA curve-she recommends repeat ultrasound by herself for follow-up growth which is already scheduled    Labor precautions given  Increase oral hydration    Followup in  2 weeks

## 2017-08-02 ENCOUNTER — ROUTINE PRENATAL (OUTPATIENT)
Dept: OBGYN | Facility: CLINIC | Age: 37
End: 2017-08-02
Payer: COMMERCIAL

## 2017-08-02 VITALS — BODY MASS INDEX: 24.76 KG/M2 | DIASTOLIC BLOOD PRESSURE: 62 MMHG | WEIGHT: 131 LBS | SYSTOLIC BLOOD PRESSURE: 102 MMHG

## 2017-08-02 DIAGNOSIS — O34.219 PREVIOUS CESAREAN SECTION COMPLICATING PREGNANCY: ICD-10-CM

## 2017-08-02 PROCEDURE — 90040 PR PRENATAL FOLLOW UP: CPT | Performed by: OBSTETRICS & GYNECOLOGY

## 2017-08-02 NOTE — PROGRESS NOTES
OB Followup;    33w5d    Patient Active Problem List    Diagnosis Date Noted   • AMA (advanced maternal age) multigravida 35+ 2017   • Previous  section complicating pregnancy 2017   • HSV infection 2017   • PCOS (polycystic ovarian syndrome) 2012       Filed Vitals:    17 1550   BP: 102/62   Weight: 59.421 kg (131 lb)       Patient presents for followup of OB care. Currently doing well . Good fetal movement no leakage of fluid no contractions or vaginal bleeding        Size equals dates, normal fetal heart rate      Has a follow up ultrasound with Dr. Griffiths for fetal measurements to size less than dates    Labor precautions given   Increase oral hydration    Followup in  2 weeks

## 2017-08-02 NOTE — MR AVS SNAPSHOT
Felisha Pedraza   2017 3:15 PM   Appointment   MRN: [id number]    Department:  [facility]   Dept Phone:  [phone]    Description:  Female : 1980   Provider:  Clifton Bloom M.D.           Allergies as of 2017     No Known Allergies      Vital Signs     Last Menstrual Period Smoking Status                2016 Former Smoker          Basic Information     Date Of Birth Sex Race Ethnicity Preferred Language    1980 Female White Non- English      Your appointments     Aug 18, 2017  3:30 PM   OB Follow Up with Darlyn Packer M.D.   [facility] ([address])    [address] [place] [address], [address]  [place] [id number] [phone]            Sep 01, 2017  3:30 PM   OB Follow Up with Ron Esquivel M.D.   [facility] ([address])    [address] [place] [address], [address]  [place] [address] [phone]            Sep 05, 2017 10:45 AM   OB Follow Up with Sara Guzman M.D.   [facility] ([address])    [address] [place] [address], [address]  [place] [address] [phone]              Problem List              ICD-10-CM Priority Class Noted - Resolved    PCOS (polycystic ovarian syndrome) E28.2   2012 - Present    AMA (advanced maternal age) multigravida 35+ O09.529   2017 - Present    Previous  section complicating pregnancy O34.219   2017 - Present    HSV infection B00.9   2017 - Present      Health Maintenance        Date Due Completion Dates    IMM DTaP/Tdap/Td Vaccine (1 - Tdap) 2001 ---    IMM INFLUENZA (1) 2017 ---    PAP SMEAR 2019            Current Immunizations     No immunizations on file.      Below and/or attached are the medications your provider expects you to take. Review all of your home medications and newly ordered medications with your provider and/or pharmacist. Follow medication instructions as directed by your provider and/or  pharmacist. Please keep your medication list with you and share with your provider. Update the information when medications are discontinued, doses are changed, or new medications (including over-the-counter products) are added; and carry medication information at all times in the event of emergency situations     Allergies:  No Known Allergies          Medications  Valid as of: August 02, 2017 -  3:19 PM    Generic Name Brand Name Tablet Size Instructions for use    Acyclovir (Tab) ZOVIRAX 400 MG Take 1 Tab by mouth 3 times a day.        Buprenorphine HCl-Naloxone HCl (FILM) Buprenorphine HCl-Naloxone HCl 8-2 MG Place 4 mg under tongue every day. DRUG ADDICTION AGENCY # [id number]    [facility] #  [id number]        Doxylamine Succinate (Sleep) (Tab) UNISOM 25 MG Take  by mouth. 1/2 tab prn insomnia        Ibuprofen (Tab) MOTRIN 800 MG Take 1 Tab by mouth every 8 hours as needed.        MetFORMIN HCl (Tab) GLUCOPHAGE 500 MG Take 1 Tab by mouth 2 times a day, with meals. Indications: Polycystic Ovary Syndrome        Norethindrone   Take  by mouth.        Prenatal Vit-Fe Fumarate-FA (Tab) PRENATAL VITAMIN PLUS LOW IRON 27-1 MG Take 1 Tab by mouth every day.        Progesterone (Suppos) Progesterone 100 MG Insert 1 Suppository in vagina 2 Times a Day.        .                 Medicines prescribed today were sent to:     [id number] - [place] - [address]    [email or web address] [facility] [address]    Phone: [phone] Fax: [phone]    Open 24 Hours?: No      Medication refill instructions:       If your prescription bottle indicates you have medication refills left, it is not necessary to call your provider’s office. Please contact your pharmacy and they will refill your medication.    If your prescription bottle indicates you do not have any refills left, you may request refills at any time through one of the following ways: The online [employer] system (except Urgent Care), by calling your provider’s office, or by asking  your pharmacy to contact your provider’s office with a refill request. Medication refills are processed only during regular business hours and may not be available until the next business day. Your provider may request additional information or to have a follow-up visit with you prior to refilling your medication.   *Please Note: Medication refills are assigned a new Rx number when refilled electronically. Your pharmacy may indicate that no refills were authorized even though a new prescription for the same medication is available at the pharmacy. Please request the medicine by name with the pharmacy before contacting your provider for a refill.        Other Notes About Your Plan     Early pregnancy : Unassigned , but will be [facility] group patient   Patient is being followed by Dr. Griffiths possibly for IUGR, most recent scan not available at this time           [employer] In[employer] Access Code: Activation code not generated  Current [employer] Status: Active

## 2017-08-14 ENCOUNTER — TELEPHONE (OUTPATIENT)
Dept: OBGYN | Facility: CLINIC | Age: 37
End: 2017-08-14

## 2017-08-18 ENCOUNTER — ROUTINE PRENATAL (OUTPATIENT)
Dept: OBGYN | Facility: CLINIC | Age: 37
End: 2017-08-18
Payer: COMMERCIAL

## 2017-08-18 ENCOUNTER — HOSPITAL ENCOUNTER (OUTPATIENT)
Facility: MEDICAL CENTER | Age: 37
End: 2017-08-18
Attending: OBSTETRICS & GYNECOLOGY
Payer: COMMERCIAL

## 2017-08-18 VITALS — DIASTOLIC BLOOD PRESSURE: 62 MMHG | SYSTOLIC BLOOD PRESSURE: 102 MMHG | WEIGHT: 136 LBS | BODY MASS INDEX: 25.71 KG/M2

## 2017-08-18 DIAGNOSIS — Z34.83 PRENATAL CARE, SUBSEQUENT PREGNANCY, THIRD TRIMESTER: ICD-10-CM

## 2017-08-18 LAB
NST ACOUSTIC STIMULATION: NO
NST ACTION NECESSARY: NORMAL
NST ASSESSMENT: REACTIVE
NST BASELINE: 140
NST INDICATIONS: NORMAL
NST OTHER DATA: NORMAL
NST READ BY: NORMAL
NST RETURN: NORMAL
NST UTERINE ACTIVITY: NO

## 2017-08-18 PROCEDURE — 90471 IMMUNIZATION ADMIN: CPT | Performed by: OBSTETRICS & GYNECOLOGY

## 2017-08-18 PROCEDURE — 59025 FETAL NON-STRESS TEST: CPT | Performed by: OBSTETRICS & GYNECOLOGY

## 2017-08-18 PROCEDURE — 87653 STREP B DNA AMP PROBE: CPT

## 2017-08-18 PROCEDURE — 90715 TDAP VACCINE 7 YRS/> IM: CPT | Performed by: OBSTETRICS & GYNECOLOGY

## 2017-08-18 PROCEDURE — 90040 PR PRENATAL FOLLOW UP: CPT | Performed by: OBSTETRICS & GYNECOLOGY

## 2017-08-18 NOTE — PROGRESS NOTES
Felisha Pedraza is a 37 y.o.  at 36w0d here today for obstetrical visit.  Patient is without complaints.    She reports good fetal movement.  She denies vaginal bleeding.  She denies rupture of membranes.  She denies contractions.     has PCOS (polycystic ovarian syndrome); AMA (advanced maternal age) multigravida 35+; Previous  section complicating pregnancy; and HSV infection on her problem list.    Review perinatology consult  Fetus is now less than 10th percentile for growth  Recommend  testing twice weekly  Recommend  delivery at 38 weeks    A/P IUP at 36w0d  AFP done  1 hour glucola done  Rhogam done  GBS today    F/U in 1 weeks

## 2017-08-18 NOTE — MR AVS SNAPSHOT
Felisha Pedraza   2017 3:30 PM   Routine Prenatal   MRN: [id number]    Department:  [facility]   Dept Phone:  [phone]    Description:  Female : 1980   Provider:  Darlyn Packer M.D.           Allergies as of 2017     No Known Allergies      You were diagnosed with     Prenatal care, subsequent pregnancy, third trimester   [054710]         Vital Signs     Blood Pressure Weight Last Menstrual Period Smoking Status          102/62 mmHg 61.689 kg (136 lb) 2016 Former Smoker        Basic Information     Date Of Birth Sex Race Ethnicity Preferred Language    1980 Female White Non- English      Your appointments     Aug 21, 2017  3:30 PM   Fetal Non-Stress Test with OBGYN E2 NST ROOM   [facility] ([address])    [address] [place] [address], [address]  [facility] [address] [phone]            Aug 23, 2017  3:30 PM   Fetal Non-Stress Test with OBGYN E2 NST ROOM   [facility] ([address])    [address] [place] [address], [address]  [facility] [address] [phone]            Aug 28, 2017  3:30 PM   Fetal Non-Stress Test with OBGYN E2 NST ROOM   [facility] ([address])    [address] [place] [address], [address]  [facility] [address] [phone]            Sep 01, 2017  3:30 PM   OB Follow Up with Ron Esquivel M.D.   [facility] ([address])    [address] [place] [address], [address]  [facility] [address] [phone]            Sep 05, 2017 10:45 AM   OB Follow Up with Sara Guzman M.D.   [facility] ([address])    [address] [place] [address], [address]  [facility] [address] [phone]            Sep 28, 2017  1:15 PM   Post Partum / Surgery with Mary Fontaine M.D.   [facility]    [address]  [facility] [address] [phone]            Oct 20, 2017  1:30 PM   Post Partum / Surgery with Sara Guzman M.D.      [facility] ([address])    [address] [place] [address], [address]  [place] [address] [phone]              Problem List              ICD-10-CM Priority Class Noted - Resolved    PCOS (polycystic ovarian syndrome) E28.2   2012 - Present    AMA (advanced maternal age) multigravida 35+ O09.529   2017 - Present    Previous  section complicating pregnancy O34.219   2017 - Present    HSV infection B00.9   2017 - Present      Health Maintenance        Date Due Completion Dates    IMM DTaP/Tdap/Td Vaccine (1 - Tdap) 2001 ---    IMM INFLUENZA (1) 2017 ---    PAP SMEAR 2019            Current Immunizations     No immunizations on file.      Below and/or attached are the medications your provider expects you to take. Review all of your home medications and newly ordered medications with your provider and/or pharmacist. Follow medication instructions as directed by your provider and/or pharmacist. Please keep your medication list with you and share with your provider. Update the information when medications are discontinued, doses are changed, or new medications (including over-the-counter products) are added; and carry medication information at all times in the event of emergency situations     Allergies:  No Known Allergies          Medications  Valid as of: 2017 -  4:32 PM    Generic Name Brand Name Tablet Size Instructions for use    Acyclovir (Tab) ZOVIRAX 400 MG Take 1 Tab by mouth 3 times a day.        Buprenorphine HCl-Naloxone HCl (FILM) Buprenorphine HCl-Naloxone HCl 8-2 MG Place 4 mg under tongue every day. DRUG ADDICTION AGENCY # [id number]    [facility] #  [id number]        Doxylamine Succinate (Sleep) (Tab) UNISOM 25 MG Take  by mouth. 1/2 tab prn insomnia        Ibuprofen (Tab) MOTRIN 800 MG Take 1 Tab by mouth every 8 hours as needed.        MetFORMIN HCl (Tab) GLUCOPHAGE 500 MG Take 1 Tab by mouth 2 times a day, with meals.  Indications: Polycystic Ovary Syndrome        Norethindrone   Take  by mouth.        Prenatal Vit-Fe Fumarate-FA (Tab) PRENATAL VITAMIN PLUS LOW IRON 27-1 MG Take 1 Tab by mouth every day.        Progesterone (Suppos) Progesterone 100 MG Insert 1 Suppository in vagina 2 Times a Day.        .                 Medicines prescribed today were sent to:     [id number] - [place], [place] - [address]    [address] [place] [address]    Phone: [phone] Fax: [phone]    Open 24 Hours?: No      Medication refill instructions:       If your prescription bottle indicates you have medication refills left, it is not necessary to call your provider’s office. Please contact your pharmacy and they will refill your medication.    If your prescription bottle indicates you do not have any refills left, you may request refills at any time through one of the following ways: The online [employer] system (except Urgent Care), by calling your provider’s office, or by asking your pharmacy to contact your provider’s office with a refill request. Medication refills are processed only during regular business hours and may not be available until the next business day. Your provider may request additional information or to have a follow-up visit with you prior to refilling your medication.   *Please Note: Medication refills are assigned a new Rx number when refilled electronically. Your pharmacy may indicate that no refills were authorized even though a new prescription for the same medication is available at the pharmacy. Please request the medicine by name with the pharmacy before contacting your provider for a refill.        Your To Do List     Future Labs/Procedures Complete By Expires    GRP B STREP, BY PCR (ARGUELLES BROTH)  As directed 8/19/2018      Referral     A referral request has been sent to our patient care coordination department. Please allow 3-5 business days for us to process this request and contact you either by phone or mail.  If you do not hear from us by the 5th business day, please call us at [phone].        Other Notes About Your Plan     Early pregnancy : Unassigned , but will be [facility] group patient   Patient is being followed by Dr. Griffiths possibly for IUGR, most recent scan not available at this time           [employer] Access Code: Activation code not generated  Current [employer] Status: Active

## 2017-08-19 DIAGNOSIS — Z34.83 PRENATAL CARE, SUBSEQUENT PREGNANCY, THIRD TRIMESTER: ICD-10-CM

## 2017-08-20 LAB — GP B STREP DNA SPEC QL NAA+PROBE: NEGATIVE

## 2017-08-21 ENCOUNTER — ROUTINE PRENATAL (OUTPATIENT)
Dept: OBGYN | Facility: CLINIC | Age: 37
End: 2017-08-21
Payer: COMMERCIAL

## 2017-08-21 DIAGNOSIS — O36.5930 IUGR (INTRAUTERINE GROWTH RETARDATION) AFFECTING MOTHER, THIRD TRIMESTER, NOT APPLICABLE OR UNSPECIFIED FETUS: ICD-10-CM

## 2017-08-21 LAB
NST ACOUSTIC STIMULATION: NO
NST ACTION NECESSARY: NORMAL
NST ASSESSMENT: REACTIVE
NST BASELINE: 130
NST INDICATIONS: NORMAL
NST OTHER DATA: NORMAL
NST READ BY: NORMAL
NST RETURN: NORMAL
NST UTERINE ACTIVITY: NO

## 2017-08-21 PROCEDURE — 59025 FETAL NON-STRESS TEST: CPT | Performed by: OBSTETRICS & GYNECOLOGY

## 2017-08-21 PROCEDURE — 90040 PR PRENATAL FOLLOW UP: CPT | Performed by: OBSTETRICS & GYNECOLOGY

## 2017-08-21 NOTE — MR AVS SNAPSHOT
Felisha Pedraza   2017 3:30 PM   Appointment   MRN: [id number]    Department:  [facility]   Dept Phone:  [phone]    Description:  Female : 1980   Provider:  LENNIE SIERRA NST ROOM           Allergies as of 2017     No Known Allergies      Vital Signs     Last Menstrual Period Smoking Status                2016 Former Smoker          Basic Information     Date Of Birth Sex Race Ethnicity Preferred Language    1980 Female White Non- English      Your appointments     Aug 23, 2017  3:30 PM   Fetal Non-Stress Test with OBGYN E2 NST ROOM   [facility] ([address])    [address] [place] [address], [address]  [place] [address] [phone]            Aug 28, 2017  3:30 PM   Fetal Non-Stress Test with OBGYN E2 NST ROOM   [facility] ([address])    [address] [place] [address], [address]  [place] [address] [phone]            Sep 01, 2017  3:30 PM   OB Follow Up with Ron Esquivel M.D.   [facility] ([address])    [address] [place] [address], [address]  [place] [address] [phone]            Sep 05, 2017 10:45 AM   OB Follow Up with Sara Guzman M.D.   [facility] ([address])    [address] [place] [address], [address]  [place] [address] [phone]            Sep 28, 2017  1:15 PM   Post Partum / Surgery with Mary Fontaine M.D.   [facility]    [address]  [place] [address] [phone]            Oct 20, 2017  1:30 PM   Post Partum / Surgery with Sara Guzman M.D.   [facility] ([address])    [address] [place] [address], [address]  [place] [address] [phone]              Problem List              ICD-10-CM Priority Class Noted - Resolved    PCOS (polycystic ovarian syndrome) E28.2   2012 - Present    AMA (advanced maternal age) multigravida 35+ O09.529   2017 - Present    Previous   section complicating pregnancy O34.219   2017 - Present    HSV infection B00.9   2017 - Present      Health Maintenance        Date Due Completion Dates    IMM INFLUENZA (1) 2017 ---    PAP SMEAR 2019    IMM DTaP/Tdap/Td Vaccine (2 - Td) 2027            Current Immunizations     Tdap Vaccine 2017      Below and/or attached are the medications your provider expects you to take. Review all of your home medications and newly ordered medications with your provider and/or pharmacist. Follow medication instructions as directed by your provider and/or pharmacist. Please keep your medication list with you and share with your provider. Update the information when medications are discontinued, doses are changed, or new medications (including over-the-counter products) are added; and carry medication information at all times in the event of emergency situations     Allergies:  No Known Allergies          Medications  Valid as of: 2017 -  4:26 PM    Generic Name Brand Name Tablet Size Instructions for use    Acyclovir (Tab) ZOVIRAX 400 MG Take 1 Tab by mouth 3 times a day.        Buprenorphine HCl-Naloxone HCl (FILM) Buprenorphine HCl-Naloxone HCl 8-2 MG Place 4 mg under tongue every day. DRUG ADDICTION AGENCY # [id number]    [facility] #  [id number]        Doxylamine Succinate (Sleep) (Tab) UNISOM 25 MG Take  by mouth. 1/2 tab prn insomnia        Ibuprofen (Tab) MOTRIN 800 MG Take 1 Tab by mouth every 8 hours as needed.        MetFORMIN HCl (Tab) GLUCOPHAGE 500 MG Take 1 Tab by mouth 2 times a day, with meals. Indications: Polycystic Ovary Syndrome        Norethindrone   Take  by mouth.        Prenatal Vit-Fe Fumarate-FA (Tab) PRENATAL VITAMIN PLUS LOW IRON 27-1 MG Take 1 Tab by mouth every day.        Progesterone (Suppos) Progesterone 100 MG Insert 1 Suppository in vagina 2 Times a Day.        .                 Medicines prescribed today were sent to:     [facility] - [place] - [address]    [address] [place] [address]    Phone: [phone] Fax: [phone]    Open 24 Hours?: No      Medication refill instructions:       If your prescription bottle indicates you have medication refills left, it is not necessary to call your provider’s office. Please contact your pharmacy and they will refill your medication.    If your prescription bottle indicates you do not have any refills left, you may request refills at any time through one of the following ways: The online [employer] system (except Urgent Care), by calling your provider’s office, or by asking your pharmacy to contact your provider’s office with a refill request. Medication refills are processed only during regular business hours and may not be available until the next business day. Your provider may request additional information or to have a follow-up visit with you prior to refilling your medication.   *Please Note: Medication refills are assigned a new Rx number when refilled electronically. Your pharmacy may indicate that no refills were authorized even though a new prescription for the same medication is available at the pharmacy. Please request the medicine by name with the pharmacy before contacting your provider for a refill.        Other Notes About Your Plan     Early pregnancy : Unassigned , but will be [facility] group patient   Patient is being followed by Dr. Griffiths possibly for IUGR, most recent scan not available at this time           [employer] Access Code: Activation code not generated  Current [employer] Status: Active

## 2017-08-23 ENCOUNTER — ROUTINE PRENATAL (OUTPATIENT)
Dept: OBGYN | Facility: CLINIC | Age: 37
End: 2017-08-23
Payer: COMMERCIAL

## 2017-08-23 DIAGNOSIS — O36.5930 IUGR (INTRAUTERINE GROWTH RESTRICTION) AFFECTING CARE OF MOTHER, THIRD TRIMESTER, NOT APPLICABLE OR UNSPECIFIED FETUS: ICD-10-CM

## 2017-08-23 LAB
NST ACOUSTIC STIMULATION: NO
NST ACTION NECESSARY: NORMAL
NST ASSESSMENT: REACTIVE
NST BASELINE: 135
NST INDICATIONS: NORMAL
NST OTHER DATA: NORMAL
NST READ BY: NORMAL
NST RETURN: NORMAL
NST UTERINE ACTIVITY: NO

## 2017-08-23 PROCEDURE — 59025 FETAL NON-STRESS TEST: CPT | Performed by: OBSTETRICS & GYNECOLOGY

## 2017-08-23 NOTE — MR AVS SNAPSHOT
Felisha Pedraza   2017 3:30 PM   Routine Prenatal   MRN: [id number]    Department:  [facility]   Dept Phone:  [phone]    Description:  Female : 1980   Provider:  Ron Esquivel M.D.           Allergies as of 2017     No Known Allergies      You were diagnosed with     IUGR (intrauterine growth restriction) affecting care of mother, third trimester, not applicable or unspecified fetus   [785152]         Vital Signs     Last Menstrual Period Smoking Status                2016 Former Smoker          Basic Information     Date Of Birth Sex Race Ethnicity Preferred Language    1980 Female White Non- English      Your appointments     Aug 28, 2017  3:30 PM   Fetal Non-Stress Test with OBGYN E2 NST ROOM   [facility] ([address])    [address] [place] [address], [address]  [facility] [address] [phone]            Sep 01, 2017  3:30 PM   OB Follow Up with Ron Esquivel M.D.   [facility] ([address])    [address] [place] [address], [address]  [facility] [address] [phone]            Sep 05, 2017 10:45 AM   OB Follow Up with Sara Guzman M.D.   [facility] ([address])    [address] [place] [address], [address]  [facility] [address] [phone]            Sep 28, 2017  1:15 PM   Post Partum / Surgery with Mary Fontaine M.D.   [facility]    [address]  [facility] [address] [phone]            Oct 20, 2017  1:30 PM   Post Partum / Surgery with Sara Guzman M.D.   [address])    [address] [place] [address], [address]  [facility] [address] [phone]              Problem List              ICD-10-CM Priority Class Noted - Resolved    PCOS (polycystic ovarian syndrome) E28.2   2012 - Present    AMA (advanced maternal age) multigravida 35+ O09.529   2017 - Present    Previous  section complicating  pregnancy O34.219   4/25/2017 - Present    HSV infection B00.9   4/25/2017 - Present      Health Maintenance        Date Due Completion Dates    IMM INFLUENZA (1) 9/1/2017 ---    PAP SMEAR 12/5/2019 12/5/2016    IMM DTaP/Tdap/Td Vaccine (2 - Td) 8/18/2027 8/18/2017            Results       Current Immunizations     Tdap Vaccine 8/18/2017      Below and/or attached are the medications your provider expects you to take. Review all of your home medications and newly ordered medications with your provider and/or pharmacist. Follow medication instructions as directed by your provider and/or pharmacist. Please keep your medication list with you and share with your provider. Update the information when medications are discontinued, doses are changed, or new medications (including over-the-counter products) are added; and carry medication information at all times in the event of emergency situations     Allergies:  No Known Allergies          Medications  Valid as of: August 23, 2017 -  4:29 PM    Generic Name Brand Name Tablet Size Instructions for use    Acyclovir (Tab) ZOVIRAX 400 MG Take 1 Tab by mouth 3 times a day.        Buprenorphine HCl-Naloxone HCl (FILM) Buprenorphine HCl-Naloxone HCl 8-2 MG Place 4 mg under tongue every day. DRUG ADDICTION AGENCY # [id number]    [facility] #  [id number]        Doxylamine Succinate (Sleep) (Tab) UNISOM 25 MG Take  by mouth. 1/2 tab prn insomnia        Ibuprofen (Tab) MOTRIN 800 MG Take 1 Tab by mouth every 8 hours as needed.        MetFORMIN HCl (Tab) GLUCOPHAGE 500 MG Take 1 Tab by mouth 2 times a day, with meals. Indications: Polycystic Ovary Syndrome        Norethindrone   Take  by mouth.        Prenatal Vit-Fe Fumarate-FA (Tab) PRENATAL VITAMIN PLUS LOW IRON 27-1 MG Take 1 Tab by mouth every day.        Progesterone (Suppos) Progesterone 100 MG Insert 1 Suppository in vagina 2 Times a Day.        .                 Medicines prescribed today were sent to:     [facility] - [place]  - [email or web address]    [address] [place] [address]    Phone: [phone] Fax: [phone]    Open 24 Hours?: No      Medication refill instructions:       If your prescription bottle indicates you have medication refills left, it is not necessary to call your provider’s office. Please contact your pharmacy and they will refill your medication.    If your prescription bottle indicates you do not have any refills left, you may request refills at any time through one of the following ways: The online [employer] system (except Urgent Care), by calling your provider’s office, or by asking your pharmacy to contact your provider’s office with a refill request. Medication refills are processed only during regular business hours and may not be available until the next business day. Your provider may request additional information or to have a follow-up visit with you prior to refilling your medication.   *Please Note: Medication refills are assigned a new Rx number when refilled electronically. Your pharmacy may indicate that no refills were authorized even though a new prescription for the same medication is available at the pharmacy. Please request the medicine by name with the pharmacy before contacting your provider for a refill.        Other Notes About Your Plan     Early pregnancy : Unassigned , but will be [facility] group patient   Patient is being followed by Dr. Griffiths possibly for IUGR, most recent scan not available at this time           [employer] Access Code: Activation code not generated  Current [employer] Status: Active

## 2017-08-28 ENCOUNTER — ROUTINE PRENATAL (OUTPATIENT)
Dept: OBGYN | Facility: CLINIC | Age: 37
End: 2017-08-28
Payer: COMMERCIAL

## 2017-08-28 DIAGNOSIS — O36.5930 IUGR (INTRAUTERINE GROWTH RESTRICTION) AFFECTING CARE OF MOTHER, THIRD TRIMESTER, NOT APPLICABLE OR UNSPECIFIED FETUS: ICD-10-CM

## 2017-08-28 LAB
NST ACOUSTIC STIMULATION: NO
NST ACTION NECESSARY: NORMAL
NST ASSESSMENT: REACTIVE
NST BASELINE: 135
NST INDICATIONS: NORMAL
NST OTHER DATA: NORMAL
NST READ BY: NORMAL
NST RETURN: NORMAL
NST UTERINE ACTIVITY: NORMAL

## 2017-08-28 PROCEDURE — 59025 FETAL NON-STRESS TEST: CPT | Performed by: OBSTETRICS & GYNECOLOGY

## 2017-09-02 ENCOUNTER — HOSPITAL ENCOUNTER (INPATIENT)
Facility: MEDICAL CENTER | Age: 37
LOS: 3 days | End: 2017-09-05
Attending: OBSTETRICS & GYNECOLOGY | Admitting: OBSTETRICS & GYNECOLOGY
Payer: COMMERCIAL

## 2017-09-02 LAB
BASOPHILS # BLD AUTO: 0.8 % (ref 0–1.8)
BASOPHILS # BLD: 0.09 K/UL (ref 0–0.12)
EOSINOPHIL # BLD AUTO: 0.09 K/UL (ref 0–0.51)
EOSINOPHIL NFR BLD: 0.8 % (ref 0–6.9)
ERYTHROCYTE [DISTWIDTH] IN BLOOD BY AUTOMATED COUNT: 41.6 FL (ref 35.9–50)
ERYTHROCYTE [DISTWIDTH] IN BLOOD BY AUTOMATED COUNT: 43.3 FL (ref 35.9–50)
HCT VFR BLD AUTO: 35.1 % (ref 37–47)
HCT VFR BLD AUTO: 37.9 % (ref 37–47)
HGB BLD-MCNC: 11.8 G/DL (ref 12–16)
HGB BLD-MCNC: 12.8 G/DL (ref 12–16)
HOLDING TUBE BB 8507: NORMAL
IMM GRANULOCYTES # BLD AUTO: 0.09 K/UL (ref 0–0.11)
IMM GRANULOCYTES NFR BLD AUTO: 0.8 % (ref 0–0.9)
LYMPHOCYTES # BLD AUTO: 1.39 K/UL (ref 1–4.8)
LYMPHOCYTES NFR BLD: 12.5 % (ref 22–41)
MCH RBC QN AUTO: 29.4 PG (ref 27–33)
MCH RBC QN AUTO: 29.8 PG (ref 27–33)
MCHC RBC AUTO-ENTMCNC: 33.6 G/DL (ref 33.6–35)
MCHC RBC AUTO-ENTMCNC: 33.8 G/DL (ref 33.6–35)
MCV RBC AUTO: 87.5 FL (ref 81.4–97.8)
MCV RBC AUTO: 88.1 FL (ref 81.4–97.8)
MONOCYTES # BLD AUTO: 0.75 K/UL (ref 0–0.85)
MONOCYTES NFR BLD AUTO: 6.8 % (ref 0–13.4)
NEUTROPHILS # BLD AUTO: 8.67 K/UL (ref 2–7.15)
NEUTROPHILS NFR BLD: 78.3 % (ref 44–72)
NRBC # BLD AUTO: 0 K/UL
NRBC BLD AUTO-RTO: 0 /100 WBC
PLATELET # BLD AUTO: 176 K/UL (ref 164–446)
PLATELET # BLD AUTO: 199 K/UL (ref 164–446)
PMV BLD AUTO: 12.3 FL (ref 9–12.9)
PMV BLD AUTO: 12.4 FL (ref 9–12.9)
RBC # BLD AUTO: 4.01 M/UL (ref 4.2–5.4)
RBC # BLD AUTO: 4.3 M/UL (ref 4.2–5.4)
WBC # BLD AUTO: 11.1 K/UL (ref 4.8–10.8)
WBC # BLD AUTO: 17.2 K/UL (ref 4.8–10.8)

## 2017-09-02 PROCEDURE — 85025 COMPLETE CBC W/AUTO DIFF WBC: CPT

## 2017-09-02 PROCEDURE — 59514 CESAREAN DELIVERY ONLY: CPT

## 2017-09-02 PROCEDURE — 700102 HCHG RX REV CODE 250 W/ 637 OVERRIDE(OP): Performed by: ANESTHESIOLOGY

## 2017-09-02 PROCEDURE — 700111 HCHG RX REV CODE 636 W/ 250 OVERRIDE (IP)

## 2017-09-02 PROCEDURE — 770002 HCHG ROOM/CARE - OB PRIVATE (112)

## 2017-09-02 PROCEDURE — 36415 COLL VENOUS BLD VENIPUNCTURE: CPT

## 2017-09-02 PROCEDURE — 700105 HCHG RX REV CODE 258: Performed by: ANESTHESIOLOGY

## 2017-09-02 PROCEDURE — 305385 HCHG SURGICAL SERVICES 1/4 HOUR

## 2017-09-02 PROCEDURE — 304964 HCHG RECOVERY ROOM TIME 1HR

## 2017-09-02 PROCEDURE — 85027 COMPLETE CBC AUTOMATED: CPT

## 2017-09-02 PROCEDURE — 700111 HCHG RX REV CODE 636 W/ 250 OVERRIDE (IP): Performed by: ANESTHESIOLOGY

## 2017-09-02 PROCEDURE — A9270 NON-COVERED ITEM OR SERVICE: HCPCS | Performed by: ANESTHESIOLOGY

## 2017-09-02 PROCEDURE — 4A1HX4Z MONITORING OF PRODUCTS OF CONCEPTION, CARDIAC ELECTRICAL ACTIVITY, EXTERNAL APPROACH: ICD-10-PCS | Performed by: OBSTETRICS & GYNECOLOGY

## 2017-09-02 PROCEDURE — 306828 HCHG ANES-TIME GENERAL: Performed by: OBSTETRICS & GYNECOLOGY

## 2017-09-02 RX ORDER — NALOXONE HYDROCHLORIDE 0.4 MG/ML
0.1 INJECTION, SOLUTION INTRAMUSCULAR; INTRAVENOUS; SUBCUTANEOUS PRN
Status: DISCONTINUED | OUTPATIENT
Start: 2017-09-02 | End: 2017-09-03

## 2017-09-02 RX ORDER — DOCUSATE SODIUM 100 MG/1
100 CAPSULE, LIQUID FILLED ORAL 2 TIMES DAILY PRN
Status: DISCONTINUED | OUTPATIENT
Start: 2017-09-02 | End: 2017-09-05 | Stop reason: HOSPADM

## 2017-09-02 RX ORDER — METOCLOPRAMIDE HYDROCHLORIDE 5 MG/ML
10 INJECTION INTRAMUSCULAR; INTRAVENOUS EVERY 6 HOURS PRN
Status: DISCONTINUED | OUTPATIENT
Start: 2017-09-02 | End: 2017-09-03

## 2017-09-02 RX ORDER — ONDANSETRON 2 MG/ML
4 INJECTION INTRAMUSCULAR; INTRAVENOUS EVERY 6 HOURS PRN
Status: DISCONTINUED | OUTPATIENT
Start: 2017-09-02 | End: 2017-09-03

## 2017-09-02 RX ORDER — VITAMIN A ACETATE, BETA CAROTENE, ASCORBIC ACID, CHOLECALCIFEROL, .ALPHA.-TOCOPHEROL ACETATE, DL-, THIAMINE MONONITRATE, RIBOFLAVIN, NIACINAMIDE, PYRIDOXINE HYDROCHLORIDE, FOLIC ACID, CYANOCOBALAMIN, CALCIUM CARBONATE, FERROUS FUMARATE, ZINC OXIDE, CUPRIC OXIDE 3080; 12; 120; 400; 1; 1.84; 3; 20; 22; 920; 25; 200; 27; 10; 2 [IU]/1; UG/1; MG/1; [IU]/1; MG/1; MG/1; MG/1; MG/1; MG/1; [IU]/1; MG/1; MG/1; MG/1; MG/1; MG/1
1 TABLET, FILM COATED ORAL EVERY MORNING
Status: DISCONTINUED | OUTPATIENT
Start: 2017-09-02 | End: 2017-09-05 | Stop reason: HOSPADM

## 2017-09-02 RX ORDER — MISOPROSTOL 200 UG/1
600 TABLET ORAL
Status: DISCONTINUED | OUTPATIENT
Start: 2017-09-02 | End: 2017-09-05 | Stop reason: HOSPADM

## 2017-09-02 RX ORDER — OXYCODONE AND ACETAMINOPHEN 10; 325 MG/1; MG/1
1 TABLET ORAL EVERY 4 HOURS PRN
Status: DISCONTINUED | OUTPATIENT
Start: 2017-09-02 | End: 2017-09-03

## 2017-09-02 RX ORDER — KETOROLAC TROMETHAMINE 30 MG/ML
30 INJECTION, SOLUTION INTRAMUSCULAR; INTRAVENOUS EVERY 6 HOURS
Status: DISCONTINUED | OUTPATIENT
Start: 2017-09-02 | End: 2017-09-02

## 2017-09-02 RX ORDER — SODIUM CHLORIDE, SODIUM LACTATE, POTASSIUM CHLORIDE, CALCIUM CHLORIDE 600; 310; 30; 20 MG/100ML; MG/100ML; MG/100ML; MG/100ML
INJECTION, SOLUTION INTRAVENOUS PRN
Status: DISCONTINUED | OUTPATIENT
Start: 2017-09-02 | End: 2017-09-05 | Stop reason: HOSPADM

## 2017-09-02 RX ORDER — DIPHENHYDRAMINE HYDROCHLORIDE 50 MG/ML
12.5 INJECTION INTRAMUSCULAR; INTRAVENOUS EVERY 6 HOURS PRN
Status: DISCONTINUED | OUTPATIENT
Start: 2017-09-02 | End: 2017-09-03

## 2017-09-02 RX ORDER — SIMETHICONE 80 MG
80 TABLET,CHEWABLE ORAL 4 TIMES DAILY PRN
Status: DISCONTINUED | OUTPATIENT
Start: 2017-09-02 | End: 2017-09-05 | Stop reason: HOSPADM

## 2017-09-02 RX ORDER — DIPHENHYDRAMINE HYDROCHLORIDE 50 MG/ML
25 INJECTION INTRAMUSCULAR; INTRAVENOUS EVERY 6 HOURS PRN
Status: DISCONTINUED | OUTPATIENT
Start: 2017-09-02 | End: 2017-09-03

## 2017-09-02 RX ORDER — CITRIC ACID/SODIUM CITRATE 334-500MG
30 SOLUTION, ORAL ORAL ONCE
Status: COMPLETED | OUTPATIENT
Start: 2017-09-02 | End: 2017-09-02

## 2017-09-02 RX ORDER — METHYLERGONOVINE MALEATE 0.2 MG/ML
0.2 INJECTION INTRAVENOUS
Status: DISCONTINUED | OUTPATIENT
Start: 2017-09-02 | End: 2017-09-05 | Stop reason: HOSPADM

## 2017-09-02 RX ORDER — KETOROLAC TROMETHAMINE 30 MG/ML
30 INJECTION, SOLUTION INTRAMUSCULAR; INTRAVENOUS EVERY 6 HOURS
Status: COMPLETED | OUTPATIENT
Start: 2017-09-02 | End: 2017-09-03

## 2017-09-02 RX ORDER — OXYCODONE HYDROCHLORIDE AND ACETAMINOPHEN 5; 325 MG/1; MG/1
1 TABLET ORAL EVERY 4 HOURS PRN
Status: DISCONTINUED | OUTPATIENT
Start: 2017-09-02 | End: 2017-09-03

## 2017-09-02 RX ORDER — SODIUM CHLORIDE, SODIUM LACTATE, POTASSIUM CHLORIDE, CALCIUM CHLORIDE 600; 310; 30; 20 MG/100ML; MG/100ML; MG/100ML; MG/100ML
1500 INJECTION, SOLUTION INTRAVENOUS ONCE
Status: COMPLETED | OUTPATIENT
Start: 2017-09-02 | End: 2017-09-02

## 2017-09-02 RX ORDER — METOCLOPRAMIDE HYDROCHLORIDE 5 MG/ML
10 INJECTION INTRAMUSCULAR; INTRAVENOUS ONCE
Status: COMPLETED | OUTPATIENT
Start: 2017-09-02 | End: 2017-09-02

## 2017-09-02 RX ADMIN — KETOROLAC TROMETHAMINE 30 MG: 30 INJECTION, SOLUTION INTRAMUSCULAR at 16:43

## 2017-09-02 RX ADMIN — METOCLOPRAMIDE 10 MG: 5 INJECTION, SOLUTION INTRAMUSCULAR; INTRAVENOUS at 08:52

## 2017-09-02 RX ADMIN — SODIUM CHLORIDE, POTASSIUM CHLORIDE, SODIUM LACTATE AND CALCIUM CHLORIDE 1000 ML: 600; 310; 30; 20 INJECTION, SOLUTION INTRAVENOUS at 08:05

## 2017-09-02 RX ADMIN — SODIUM CITRATE AND CITRIC ACID MONOHYDRATE 30 ML: 500; 334 SOLUTION ORAL at 09:08

## 2017-09-02 RX ADMIN — Medication 20 UNITS: at 11:09

## 2017-09-02 RX ADMIN — FAMOTIDINE 20 MG: 10 INJECTION INTRAVENOUS at 08:48

## 2017-09-02 RX ADMIN — KETOROLAC TROMETHAMINE 30 MG: 30 INJECTION, SOLUTION INTRAMUSCULAR at 22:44

## 2017-09-02 RX ADMIN — OXYCODONE HYDROCHLORIDE AND ACETAMINOPHEN 1 TABLET: 10; 325 TABLET ORAL at 19:10

## 2017-09-02 RX ADMIN — OXYCODONE HYDROCHLORIDE AND ACETAMINOPHEN 1 TABLET: 5; 325 TABLET ORAL at 23:13

## 2017-09-02 RX ADMIN — OXYCODONE HYDROCHLORIDE AND ACETAMINOPHEN 1 TABLET: 10; 325 TABLET ORAL at 13:22

## 2017-09-02 ASSESSMENT — PATIENT HEALTH QUESTIONNAIRE - PHQ9
SUM OF ALL RESPONSES TO PHQ QUESTIONS 1-9: 0
SUM OF ALL RESPONSES TO PHQ9 QUESTIONS 1 AND 2: 0
2. FEELING DOWN, DEPRESSED, IRRITABLE, OR HOPELESS: NOT AT ALL
1. LITTLE INTEREST OR PLEASURE IN DOING THINGS: NOT AT ALL

## 2017-09-02 ASSESSMENT — LIFESTYLE VARIABLES
EVER HAD A DRINK FIRST THING IN THE MORNING TO STEADY YOUR NERVES TO GET RID OF A HANGOVER: NO
EVER_SMOKED: YES
HAVE YOU EVER FELT YOU SHOULD CUT DOWN ON YOUR DRINKING: NO
TOTAL SCORE: 0
HOW MANY TIMES IN THE PAST YEAR HAVE YOU HAD 5 OR MORE DRINKS IN A DAY: 1
DO YOU DRINK ALCOHOL: YES
TOTAL SCORE: 0
ON A TYPICAL DAY WHEN YOU DRINK ALCOHOL HOW MANY DRINKS DO YOU HAVE: 1
HAVE PEOPLE ANNOYED YOU BY CRITICIZING YOUR DRINKING: NO
AVERAGE NUMBER OF DAYS PER WEEK YOU HAVE A DRINK CONTAINING ALCOHOL: 7
EVER FELT BAD OR GUILTY ABOUT YOUR DRINKING: NO
TOTAL SCORE: 0
CONSUMPTION TOTAL: POSITIVE

## 2017-09-02 ASSESSMENT — PAIN SCALES - GENERAL
PAINLEVEL_OUTOF10: 7
PAINLEVEL_OUTOF10: 3
PAINLEVEL_OUTOF10: 5
PAINLEVEL_OUTOF10: 7
PAINLEVEL_OUTOF10: 4
PAINLEVEL_OUTOF10: 4

## 2017-09-02 NOTE — PROGRESS NOTES
0730-  at 38-1 presents for scheduled R C/S for IUGR. Reports BH ctx, + FM, and denies LOF and VB. POC discussed. C/S prep and admission profile Vcompleted. Ambulated to OR 1 at 0932. Vigorous female infant with 8/9 apgars delivered at 1009. Pt recovered without pain or complications. Infant and spouse remained at bedside. Taken to PP via emily and report given to Gloria.

## 2017-09-02 NOTE — OR SURGEON
Operative Report    PreOp Diagnosis: IUP @ 38+1/7 wks with IUGR, hx of previous C/S, breech presentation    PostOp Diagnosis: Same    Procedure(s):  REPEAT C SECTION - Wound Class: Clean Contaminated    Surgeon(s):  Sara Guzman M.D.    Anesthesiologist/Type of Anesthesia:  Anesthesiologist: Jack Goddard M.D./* No anesthesia type entered *    Surgical Staff:  Assistant: Laura Turner  Circulator: Daya Swartz R.N.  Scrub Person: Carla Ramsay  L&LUCILLE Baby  Nurse: Emely Robison R.N.; Felisha Stern R.N.    Specimens:  * No specimens in log *    Estimated Blood Loss: 500 cc    Findings: viable female infant in breech presentation, apgars 8 and 9, clear fluid.  Normal uterus, tubes ovaries.     Complications: None.        9/2/2017 11:38 AM Sara Guzman

## 2017-09-02 NOTE — H&P
History and Physical      Felisha Pedraza is a 37 y.o. year old female  at 38w1d who presents for repeat C/S due to IUGR and breech presentation.     Subjective:   negative  For CTXS.   negative Feels pain   negative for LOF  negative for vaginal bleeding.   positive for fetal movement    ROS: Pertinent items are noted in HPI.    Past Medical History:   Diagnosis Date   • Migraine    • Other specified symptom associated with female genital organs    • PCOS (polycystic ovarian syndrome)      Past Surgical History:   Procedure Laterality Date   • PELVISCOPY  4/3/2013    [facility]   • CYST EXCISION  4/3/2013    Performed by Shin Mayo M.D. at [facility]   • OTHER ORTHOPEDIC SURGERY      clavical right     OB History    Para Term  AB Living   2 1 1     1   SAB TAB Ectopic Molar Multiple Live Births             1      # Outcome Date GA Lbr Gideon/2nd Weight Sex Delivery Anes PTL Lv   2 Current            1 Term 08/03/15    F CS-Unspec   ONUR        Social History     Social History   • Marital status:      Spouse name: N/A   • Number of children: N/A   • Years of education: N/A     Occupational History   • Not on file.     Social History Main Topics   • Smoking status: Former Smoker     Packs/day: 0.10     Years: 10.00     Types: Cigarettes   • Smokeless tobacco: Not on file   • Alcohol use Yes      Comment: 2 per week   • Drug use: No   • Sexual activity: Yes     Partners: Male     Birth control/ protection: Pill     Other Topics Concern   • Not on file     Social History Narrative   • No narrative on file     Allergies: Review of patient's allergies indicates no known allergies.    Current Facility-Administered Medications:   •  LR (BOLUS) infusion 1,500 mL 30 min prior to  section, 1,500 mL, Intravenous, Once, Jack Goddard M.D.  •  Sod Citrate-Citric Acid (BICITRA) 500-334 MG/5ML solution 30  "mL, 30 mL, Oral, Once, Jack Goddard M.D.  •  metoclopramide (REGLAN) injection 10 mg, 10 mg, Intravenous, Once, Jack Goddard M.D.    Prenatal care with [facility] starting at 15 wks with following problems:  Patient Active Problem List    Diagnosis Date Noted   • AMA (advanced maternal age) multigravida 35+ 2017   • Previous  section complicating pregnancy 2017   • HSV infection 2017   • PCOS (polycystic ovarian syndrome) 2012               Objective:      Blood pressure 103/58, pulse 83, temperature 37 °C (98.6 °F), resp. rate 16, height 1.549 m (5' 1\"), weight 61.7 kg (136 lb), last menstrual period 2016.    General:   no acute distress   Skin:   normal   HEENT:  PERRLA   Lungs:   CTA bilateral   Heart:   S1, S2 normal, no murmur, click, rub or gallop, regular rate and rhythm, brisk carotid upstroke without bruits, peripheral pulses very brisk, chest is clear without rales or wheezing, no pedal edema, no JVD, no hepatosplenomegaly   Abdomen:   gravid, NT   EFW:  2800 grams   Pelvis:  adequate with gynecoid pelvis   FHT:  150s BPM, moderate variability, + accels   Uterine Size: S<D   Presentations: Breech   Cervix:     Dilation: Closed    Effacement: Long    Station:  Floating    Consistency: Medium    Position: Posterior     Lab Review  Lab:   Blood type: A     Recent Results (from the past 5880 hour(s))   HCG QUANTITATIVE    Collection Time: 17  4:01 PM   Result Value Ref Range    Bhcg 219,006.1 (H) 0.0 - 5.0 mIU/mL   PROGESTERONE    Collection Time: 17  4:01 PM   Result Value Ref Range    Progesterone 29.25 ng/mL   PRENATAL TESTING ANEUPLOIDY    Collection Time: 17 10:48 AM   Result Value Ref Range    Maternal Weight (pounds) 111     Gestation Age 12     Gestational Age at Draw (days) 4     Report Fetal Sex? Yes     Trisomy 21 Low risk     Trisomy 18 Low risk     Trisomy 13 Low risk     Monosomy X Low risk     Triploidy/Vanishing Twin Low risk     " Fetal Sex Female     Fetal Fraction 14.5     Result Summary Low risk     EER Non-Invasive Prenatal, Aneuploidy See Note    CHLAMYDIA/GC PCR URINE OR SWAB    Collection Time: 03/24/17  1:35 PM   Result Value Ref Range    Source GENITAL     C. trachomatis by PCR Negative Negative    N. gonorrhoeae by PCR Negative Negative   PRENATAL PANEL 3+HIV+UACXI    Collection Time: 03/24/17  1:56 PM   Result Value Ref Range    Color Yellow     Character Clear     Specific Gravity 1.019 <1.035    Ph 6.0 5.0 - 8.0    Glucose Negative Negative mg/dL    Ketones Negative Negative mg/dL    Protein Negative Negative mg/dL    Bilirubin Negative Negative    Nitrite Negative Negative    Leukocyte Esterase Negative Negative    Occult Blood Negative Negative    Micro Urine Req see below     Culture Indicated No UA Culture    Rubella IgG Antibody 225.50 IU/mL    Syphilis, Treponemal Qual Non Reactive Non Reactive    Hepatitis B Surface Antigen Negative Negative    WBC 8.6 4.8 - 10.8 K/uL    RBC 4.04 (L) 4.20 - 5.40 M/uL    Hemoglobin 11.9 (L) 12.0 - 16.0 g/dL    Hematocrit 36.2 (L) 37.0 - 47.0 %    MCV 89.6 81.4 - 97.8 fL    MCH 29.5 27.0 - 33.0 pg    MCHC 32.9 (L) 33.6 - 35.0 g/dL    RDW 42.5 35.9 - 50.0 fL    Platelet Count 182 164 - 446 K/uL    MPV 12.9 9.0 - 12.9 fL    Neutrophils-Polys 78.30 (H) 44.00 - 72.00 %    Lymphocytes 14.50 (L) 22.00 - 41.00 %    Monocytes 5.10 0.00 - 13.40 %    Eosinophils 0.90 0.00 - 6.90 %    Basophils 0.90 0.00 - 1.80 %    Immature Granulocytes 0.30 0.00 - 0.90 %    Nucleated RBC 0.00 /100 WBC    Neutrophils (Absolute) 6.71 2.00 - 7.15 K/uL    Lymphs (Absolute) 1.24 1.00 - 4.80 K/uL    Monos (Absolute) 0.44 0.00 - 0.85 K/uL    Eos (Absolute) 0.08 0.00 - 0.51 K/uL    Baso (Absolute) 0.08 0.00 - 0.12 K/uL    Immature Granulocytes (abs) 0.03 0.00 - 0.11 K/uL    NRBC (Absolute) 0.00 K/uL   CHLAMYDIA/GC PCR URINE OR SWAB    Collection Time: 03/24/17  1:56 PM   Result Value Ref Range    Source Urine     C.  trachomatis by PCR Negative Negative    N. gonorrhoeae by PCR Negative Negative   HIV ANTIBODIES    Collection Time: 03/24/17  1:56 PM   Result Value Ref Range    HIV Ag/Ab Combo Assay Non Reactive Non Reactive   OP PRENATAL PANEL-BLOOD BANK    Collection Time: 03/24/17  1:56 PM   Result Value Ref Range    ABO Grouping Only A     Rh Grouping Only POS     Antibody Screen Scrn NEG    GLUCOSE 1HR GESTATIONAL    Collection Time: 06/20/17 11:05 AM   Result Value Ref Range    Glucose, Post Dose 75 70 - 139 mg/dL   HCT    Collection Time: 06/20/17 11:05 AM   Result Value Ref Range    Hematocrit 35.9 (L) 37.0 - 47.0 %   HGB    Collection Time: 06/20/17 11:05 AM   Result Value Ref Range    Hemoglobin 11.6 (L) 12.0 - 16.0 g/dL   T.PALLIDUM AB EIA    Collection Time: 06/20/17 11:05 AM   Result Value Ref Range    Syphilis, Treponemal Qual Non Reactive Non Reactive   GRP B STREP, BY PCR (ARGUELLES BROTH)    Collection Time: 08/18/17  4:42 PM   Result Value Ref Range    Strep Gp B DNA PCR Negative Negative   POCT NST    Collection Time: 08/18/17  4:52 PM   Result Value Ref Range    NST Indications IUGR     NST Baseline 140     NST Uterine Activity no     NST Acoustic Stimulation no     NST Assessment reactive     NST Action Necessary      NST Other Data      NST Return      NST Read By     POCT Fetal Nonstress Test    Collection Time: 08/21/17  4:35 PM   Result Value Ref Range    NST Indications IUGR     NST Baseline 130     NST Uterine Activity no     NST Acoustic Stimulation no     NST Assessment reactive     NST Action Necessary      NST Other Data      NST Return      NST Read By     POCT Fetal Nonstress Test    Collection Time: 08/23/17  4:21 PM   Result Value Ref Range    NST Indications IUGR     NST Baseline 135     NST Uterine Activity no     NST Acoustic Stimulation no     NST Assessment reactive     NST Action Necessary      NST Other Data      NST Return      NST Read By Alvin    POCT Fetal Nonstress Test    Collection  Time: 17  5:23 PM   Result Value Ref Range    NST Indications SGA     NST Baseline 135     NST Uterine Activity none     NST Acoustic Stimulation no     NST Assessment reactive     NST Action Necessary none     NST Other Data      NST Return      NST Read By     CBC WITH DIFFERENTIAL    Collection Time: 17  8:05 AM   Result Value Ref Range    WBC 11.1 (H) 4.8 - 10.8 K/uL    RBC 4.30 4.20 - 5.40 M/uL    Hemoglobin 12.8 12.0 - 16.0 g/dL    Hematocrit 37.9 37.0 - 47.0 %    MCV 88.1 81.4 - 97.8 fL    MCH 29.8 27.0 - 33.0 pg    MCHC 33.8 33.6 - 35.0 g/dL    RDW 43.3 35.9 - 50.0 fL    Platelet Count 176 164 - 446 K/uL    MPV 12.4 9.0 - 12.9 fL    Neutrophils-Polys 78.30 (H) 44.00 - 72.00 %    Lymphocytes 12.50 (L) 22.00 - 41.00 %    Monocytes 6.80 0.00 - 13.40 %    Eosinophils 0.80 0.00 - 6.90 %    Basophils 0.80 0.00 - 1.80 %    Immature Granulocytes 0.80 0.00 - 0.90 %    Nucleated RBC 0.00 /100 WBC    Neutrophils (Absolute) 8.67 (H) 2.00 - 7.15 K/uL    Lymphs (Absolute) 1.39 1.00 - 4.80 K/uL    Monos (Absolute) 0.75 0.00 - 0.85 K/uL    Eos (Absolute) 0.09 0.00 - 0.51 K/uL    Baso (Absolute) 0.09 0.00 - 0.12 K/uL    Immature Granulocytes (abs) 0.09 0.00 - 0.11 K/uL    NRBC (Absolute) 0.00 K/uL   HOLD BLOOD BANK SPECIMEN (NOT TESTED)    Collection Time: 17  8:05 AM   Result Value Ref Range    Holding Tube - Bb DONE         Assessment:   Felisha Raisa Milo at 38w1d with IUGR  - <10%tile and breech presentation, hx of previous C/S.  Declines BTL.  Labor status: Not in labor.  Obstetrical history significant for   Patient Active Problem List    Diagnosis Date Noted   • AMA (advanced maternal age) multigravida 35+ 2017   • Previous  section complicating pregnancy 2017   • HSV infection 2017   • PCOS (polycystic ovarian syndrome) 2012   .      Plan:   Admit to L&D for repeat C/S.  GBS negative    Discussed with the patient indications for  delivery. The patient voiced  understanding of indications for  section at this time.    Discussed with the patient the risks of  delivery. The risks include bleeding, infection, transfusion, emergency hysterectomy to control bleeding, damage to surrounding organs (bowel, bladder, ureters, nerves, vessels), need for repair or future surgery, fetal injury, unexpected pathology, anesthesia risks, and rarely death.  I also discussed with the patient the risk of wound infection, wound breakdown, and scarring. We discussed that these risks are greater in people that have diabetes or obesity.    The patient had the opportunity to ask questions regarding the procedure. All questions answered to the patient's satisfaction. Plan to proceed with  delivery.

## 2017-09-02 NOTE — OP REPORT
DATE OF SERVICE:  2017    PREOPERATIVE DIAGNOSES:  1.  Intrauterine pregnancy at 38-1/7 weeks' estimated gestational age.  2.  Intrauterine growth restriction.  3.  Breech presentation.  4.  History of previous  section.    POSTOPERATIVE DIAGNOSES:  1.  Intrauterine pregnancy at 38-1/7 weeks' estimated gestational age.  2.  Intrauterine growth restriction.  3.  Breech presentation.  4.  History of previous  section.    PROCEDURE:  Repeat low transverse  section via Pfannenstiel.    SURGEON:  Sara Guzman MD    FIRST ASSISTANT:  Sasha Noel MD    ANESTHESIA:  Spinal.    COMPLICATIONS:  None.    ESTIMATED BLOOD LOSS:  500 mL    FLUIDS:  1300 mL LR.    URINE OUTPUT:  150 mL    INDICATIONS FOR PROCEDURE:  This is a 37-year-old  2, para 1-0-0-1, at   38-1/7 weeks' estimated gestational age, who presents for repeat  due   to IUGR and breech presentation.  The patient has a history of a previous    section.    FINDINGS:  Viable female infant in breech presentation with Apgars of 8 and 9.    Normal uterus, tubes, and ovaries bilaterally.    PROCEDURE IN DETAIL:  The patient was taken to the operating room where spinal   anesthesia was found to be adequate.  She was then prepped and draped in the   normal sterile fashion in the dorsal supine position with a leftward tilt.  A   Pfannenstiel skin incision was made with a scalpel and carried down to the   underlying layer of the fascia, which was nicked in the midline and the   incision extended laterally with the Tobin scissors.  The superior aspect of   the fascial incision was then grasped with Kocher clamps, elevated and the   underlying rectus muscles dissected off sharply.  Attention was then turned to   the inferior aspect of this incision, which in a similar fashion was grasped,   tented up with the Kocher clamps and the rectus muscles dissected off   sharply.  The rectus muscles were then  in the  midline and the   peritoneum identified, tented up and entered sharply with the Metzenbaum   scissors.  The peritoneal incision was then extended superiorly and inferiorly   with good visualization of the bladder.  The bladder blade was then inserted   and the vesicouterine peritoneum was identified, grasped with pickups and   entered sharply with the Metzenbaum scissors.  This incision was extended   laterally and the bladder flap was created digitally.  The bladder blade was   then reinserted and the lower uterine segment incised in a transverse fashion   with the scalpel.  The uterine incision was then extended laterally digitally.    The bladder blade was removed and the infant's breech was delivered   atraumatically.  The infant was delivered to the level of the chest.  The arms   were flexed and brought to the midline and delivered, and the head was flexed   and delivered without difficulty.  The cord was clamped and cut.  The mouth   and nose were bulb suctioned and the infant was handed off to the waiting   attendants.  The placenta was then removed manually.  The uterus exteriorized,   cleared of all clot and debris.  The uterine incision was repaired with 1-0   chromic in a running locked fashion.  Additional [id number] were placed   with the same suture for hemostasis.  The uterus was returned to the abdomen.    The gutters were cleared of all clot and debris.  The peritoneum was closed   with 3-0 Vicryl.  The fascia was reapproximated with 0 Vicryl in a running   fashion.  The subcuticular fat was irrigated.  Stanislav's layer was closed with   interrupted sutures of 2-0 Vicryl and the skin was closed with staples.  The   patient tolerated the procedure well.  Sponge, lap and needle counts were   correct x3.  The patient was taken to the recovery room in stable condition.       ____________________________________     Sara Guzman MD A[facility] / [facility]ENE    DD:  09/02/2017 13:48:13  DT:  09/02/2017  15:51:43    D#:  [id number]  Job#:  [id number]

## 2017-09-03 PROCEDURE — 700102 HCHG RX REV CODE 250 W/ 637 OVERRIDE(OP): Performed by: ANESTHESIOLOGY

## 2017-09-03 PROCEDURE — 770002 HCHG ROOM/CARE - OB PRIVATE (112)

## 2017-09-03 PROCEDURE — 700112 HCHG RX REV CODE 229: Performed by: STUDENT IN AN ORGANIZED HEALTH CARE EDUCATION/TRAINING PROGRAM

## 2017-09-03 PROCEDURE — A9270 NON-COVERED ITEM OR SERVICE: HCPCS | Performed by: STUDENT IN AN ORGANIZED HEALTH CARE EDUCATION/TRAINING PROGRAM

## 2017-09-03 PROCEDURE — 700102 HCHG RX REV CODE 250 W/ 637 OVERRIDE(OP): Performed by: STUDENT IN AN ORGANIZED HEALTH CARE EDUCATION/TRAINING PROGRAM

## 2017-09-03 PROCEDURE — 700111 HCHG RX REV CODE 636 W/ 250 OVERRIDE (IP): Performed by: ANESTHESIOLOGY

## 2017-09-03 PROCEDURE — A9270 NON-COVERED ITEM OR SERVICE: HCPCS | Performed by: ANESTHESIOLOGY

## 2017-09-03 RX ORDER — OXYCODONE HYDROCHLORIDE AND ACETAMINOPHEN 5; 325 MG/1; MG/1
1 TABLET ORAL EVERY 4 HOURS PRN
Status: DISCONTINUED | OUTPATIENT
Start: 2017-09-03 | End: 2017-09-05 | Stop reason: HOSPADM

## 2017-09-03 RX ORDER — IBUPROFEN 800 MG/1
800 TABLET ORAL EVERY 8 HOURS PRN
Status: DISCONTINUED | OUTPATIENT
Start: 2017-09-03 | End: 2017-09-05 | Stop reason: HOSPADM

## 2017-09-03 RX ORDER — ACETAMINOPHEN 325 MG/1
325 TABLET ORAL EVERY 4 HOURS PRN
Status: DISCONTINUED | OUTPATIENT
Start: 2017-09-03 | End: 2017-09-05 | Stop reason: HOSPADM

## 2017-09-03 RX ORDER — ONDANSETRON 4 MG/1
4 TABLET, ORALLY DISINTEGRATING ORAL EVERY 6 HOURS PRN
Status: DISCONTINUED | OUTPATIENT
Start: 2017-09-03 | End: 2017-09-05 | Stop reason: HOSPADM

## 2017-09-03 RX ORDER — OXYCODONE AND ACETAMINOPHEN 10; 325 MG/1; MG/1
1 TABLET ORAL EVERY 4 HOURS PRN
Status: DISCONTINUED | OUTPATIENT
Start: 2017-09-03 | End: 2017-09-05 | Stop reason: HOSPADM

## 2017-09-03 RX ORDER — ONDANSETRON 2 MG/ML
4 INJECTION INTRAMUSCULAR; INTRAVENOUS EVERY 6 HOURS PRN
Status: DISCONTINUED | OUTPATIENT
Start: 2017-09-03 | End: 2017-09-05 | Stop reason: HOSPADM

## 2017-09-03 RX ADMIN — OXYCODONE HYDROCHLORIDE AND ACETAMINOPHEN 1 TABLET: 5; 325 TABLET ORAL at 22:29

## 2017-09-03 RX ADMIN — DOCUSATE SODIUM 100 MG: 100 CAPSULE ORAL at 08:20

## 2017-09-03 RX ADMIN — KETOROLAC TROMETHAMINE 30 MG: 30 INJECTION, SOLUTION INTRAMUSCULAR at 10:33

## 2017-09-03 RX ADMIN — DOCUSATE SODIUM 100 MG: 100 CAPSULE ORAL at 22:29

## 2017-09-03 RX ADMIN — OXYCODONE HYDROCHLORIDE AND ACETAMINOPHEN 1 TABLET: 10; 325 TABLET ORAL at 09:02

## 2017-09-03 RX ADMIN — OXYCODONE HYDROCHLORIDE AND ACETAMINOPHEN 1 TABLET: 10; 325 TABLET ORAL at 13:39

## 2017-09-03 RX ADMIN — SIMETHICONE 80 MG: 80 TABLET, CHEWABLE ORAL at 08:21

## 2017-09-03 RX ADMIN — OXYCODONE HYDROCHLORIDE AND ACETAMINOPHEN 1 TABLET: 10; 325 TABLET ORAL at 18:02

## 2017-09-03 RX ADMIN — Medication 1 TABLET: at 08:20

## 2017-09-03 RX ADMIN — KETOROLAC TROMETHAMINE 30 MG: 30 INJECTION, SOLUTION INTRAMUSCULAR at 04:43

## 2017-09-03 RX ADMIN — OXYCODONE HYDROCHLORIDE AND ACETAMINOPHEN 1 TABLET: 5; 325 TABLET ORAL at 04:43

## 2017-09-03 RX ADMIN — IBUPROFEN 800 MG: 800 TABLET, FILM COATED ORAL at 18:02

## 2017-09-03 ASSESSMENT — PAIN SCALES - GENERAL
PAINLEVEL_OUTOF10: 7
PAINLEVEL_OUTOF10: 4
PAINLEVEL_OUTOF10: 7
PAINLEVEL_OUTOF10: 4
PAINLEVEL_OUTOF10: 3
PAINLEVEL_OUTOF10: 2
PAINLEVEL_OUTOF10: 3
PAINLEVEL_OUTOF10: 3
PAINLEVEL_OUTOF10: 4
PAINLEVEL_OUTOF10: 7
PAINLEVEL_OUTOF10: 6

## 2017-09-03 NOTE — CONSULTS
Initial visit. MOB had previous difficulties breastfeeding first child. Discussed normal course of breastfeeding and what to expect at 12-48 hours. Encouraged frequent skin to skin, to offer breast every 2-3 hours, and to hand express if able to. Assisted with latch to right breast, infant able to sustain a few sucks with some nipple ridging and breast compression during feeding to keep infant stimulated to suck. Ongoing breastfeeding support offered.

## 2017-09-03 NOTE — CARE PLAN
"Problem: Altered physiologic condition related to postoperative  delivery  Goal: Patient physiologically stable as evidenced by normal lochia, palpable uterine involution and vital signs within normal limits  Outcome: PROGRESSING AS EXPECTED  Blood pressure low but patient states that is her \"norm\". Patient is asymptomatic. H/H results are WDL. Fundus is firm with scant lochia. Patient educated on when to pull emergency call light.     Problem: Potential for postpartum infection related to surgical incision, compromised uterine condition, urinary tract or respiratory compromise  Goal: Patient will be afebrile and free from signs and symptoms of infection  Outcome: PROGRESSING AS EXPECTED  Pt remains afebrile. No signs or symptoms of infection. Dressing CDI.       "

## 2017-09-03 NOTE — PROGRESS NOTES
Patient arrived on unit at 1200 with infant and L&D RN in [facility]. Assessment completed. Gale to gravity. SCDs and  in use. Patient states pain 4/10; medicated per MAR. Patient oriented to room; verbalizes understanding. Plan of care discussed. All questions and concerns addressed. Call light demonstrated. Bed in lowest, locked position. Will continue to monitor.

## 2017-09-03 NOTE — PROGRESS NOTES
Patient started on a pump. Discussed reasons and patient verbalized understanding. Educated on cleaning parts, pumping times and length. Patient encouraged to pump q 3 hrs. Supplementation will also be started with mothers choice.

## 2017-09-03 NOTE — CONSULTS
Babe to breast. Mother shown how to get a deep latch with positioning baby. Discussed unlatching baby when suckling is painful. Bruising noted carlos. on nipples and areolas noted to be bright pink but intact. Discussed frequency of feedings and skin to skin care.

## 2017-09-03 NOTE — PROGRESS NOTES
Received report from Yessenia Castro. Patient in bed, skin to skin with infant. Gale in place draining to gravity, IV saline locked. Whiteboards updated, POC discussed. Patient call light with reach. Encouraged to call with any needs and or concerns. Patient would like to receive pain medication when available.

## 2017-09-03 NOTE — PROGRESS NOTES
Spoke with JUAN Cuello. To start at risk algorithim, pumping and supplementing for infant due to IUGR and early term 38.1, and MOB has hx of PCOS.

## 2017-09-03 NOTE — CARE PLAN
Problem: Altered physiologic condition related to postoperative  delivery  Goal: Patient physiologically stable as evidenced by normal lochia, palpable uterine involution and vital signs within normal limits  Outcome: PROGRESSING AS EXPECTED  Vital signs stable, fundus is firm and lochia is light.    Problem: Potential for postpartum infection related to surgical incision, compromised uterine condition, urinary tract or respiratory compromise  Goal: Patient will be afebrile and free from signs and symptoms of infection  Outcome: PROGRESSING AS EXPECTED  Patient is afebrile and free from s/s of infection.

## 2017-09-03 NOTE — PROGRESS NOTES
Post Partum Progress Note    Name:   Felisha Pedraza   Date/Time:  9/3/2017 - 6:04 AM  Chief Admitting Dx:  Pregnancy  39 WEEKS GESTATION  Labor and delivery indication for care or intervention  Delivery Type:   for breech  Post-Op/Post Partum Days #:  1    Subjective:  Abdominal pain: no  Ambulating:   yes  Tolerating liquids:  yes  Tolerating food:  yes common adult  Flatus:   no  BM:    no  Bleeding:   without any bleeding  Voiding:   yes  Dizziness:   no  Feeding:   Breast    Reports pain in her shoulders, similar to referred pain she experienced after lap surgery, feels it is trapped gas. Discussed ambulation. Mint tea, simethicone    Vitals:    17 2200 17 2300 17 0000 17 0100   BP:   (!) 85/51    Pulse: 83 70 69 75   Resp: 18 16 20 20   Temp:   36.9 °C (98.4 °F)    SpO2: 94% 95% 96% 93%   Weight:       Height:           Exam:  Breast: Tenderness no  Abdomen: Abdomen soft, non-tender. BS normal. No masses,  No organomegaly  Fundal Tenderness:  no  Fundus Firm: yes  Incision: Dsg in place until 24 hr post-op. No signs of breakthrough bleeding  Below umbilicus: no  Perineum: perineum intact  Lochia: mild  Extremities: Normal extremities, peripheral pulses and reflexes normal    Meds:  Current Facility-Administered Medications   Medication Dose   • LR infusion     • PRN oxytocin (PITOCIN) (20 Units/1000 mL) PRN for excessive uterine bleeding - See Admin Instr  125-999 mL/hr   • misoprostol (CYTOTEC) tablet 600 mcg  600 mcg   • methylergonovine (METHERGINE) injection 0.2 mg  0.2 mg   • docusate sodium (COLACE) capsule 100 mg  100 mg   • prenatal plus vitamin (STUARTNATAL 1+1) 27-1 MG tablet 1 Tab  1 Tab   • simethicone (MYLICON) chewable tab 80 mg  80 mg   • naloxone (NARCAN) injection 0.1 mg  0.1 mg   • oxycodone-acetaminophen (PERCOCET) 5-325 MG per tablet 1 Tab  1 Tab   • oxycodone-acetaminophen (PERCOCET-10)  MG per tablet 1 Tab  1 Tab   • HYDROmorphone (DILAUDID)  injection 0.2 mg  0.2 mg   • HYDROmorphone (DILAUDID) injection 0.4 mg  0.4 mg   • ephedrine injection 10 mg  10 mg   • ondansetron (ZOFRAN) syringe/vial injection 4 mg  4 mg   • metoclopramide (REGLAN) injection 10 mg  10 mg   • naloxone (NARCAN) 0.4 mg in NS 1,000 mL infusion  0.4 mg   • diphenhydrAMINE (BENADRYL) injection 12.5 mg  12.5 mg   • diphenhydrAMINE (BENADRYL) injection 25 mg  25 mg   • naloxone (NARCAN) 0.4 mg in NS 1,000 mL infusion  0.4 mg   • ketorolac (TORADOL) injection 30 mg  30 mg       Labs:   Recent Labs      17   0805  17   1909   WBC  11.1*  17.2*   RBC  4.30  4.01*   HEMOGLOBIN  12.8  11.8*   HEMATOCRIT  37.9  35.1*   MCV  88.1  87.5   MCH  29.8  29.4   MCHC  33.8  33.6   RDW  43.3  41.6   PLATELETCT  176  199   MPV  12.4  12.3       Assessment:  Chief Admitting Dx:  Pregnancy  39 WEEKS GESTATION  Labor and delivery indication for care or intervention  Delivery Type:   for breech  Tubal Ligation:  no    Plan:  Continue routine post partum care.  Advance care-encourage ambulation, pain management  Anticipate discharge on POD#3    TOSHIA Boyer.

## 2017-09-04 PROCEDURE — A9270 NON-COVERED ITEM OR SERVICE: HCPCS | Performed by: STUDENT IN AN ORGANIZED HEALTH CARE EDUCATION/TRAINING PROGRAM

## 2017-09-04 PROCEDURE — A9270 NON-COVERED ITEM OR SERVICE: HCPCS | Performed by: PHYSICIAN ASSISTANT

## 2017-09-04 PROCEDURE — 700102 HCHG RX REV CODE 250 W/ 637 OVERRIDE(OP): Performed by: STUDENT IN AN ORGANIZED HEALTH CARE EDUCATION/TRAINING PROGRAM

## 2017-09-04 PROCEDURE — 770002 HCHG ROOM/CARE - OB PRIVATE (112)

## 2017-09-04 PROCEDURE — 700102 HCHG RX REV CODE 250 W/ 637 OVERRIDE(OP): Performed by: PHYSICIAN ASSISTANT

## 2017-09-04 RX ORDER — POLYETHYLENE GLYCOL 3350 17 G/17G
1 POWDER, FOR SOLUTION ORAL DAILY
Status: DISCONTINUED | OUTPATIENT
Start: 2017-09-04 | End: 2017-09-05 | Stop reason: HOSPADM

## 2017-09-04 RX ADMIN — OXYCODONE HYDROCHLORIDE AND ACETAMINOPHEN 1 TABLET: 5; 325 TABLET ORAL at 03:12

## 2017-09-04 RX ADMIN — OXYCODONE HYDROCHLORIDE AND ACETAMINOPHEN 1 TABLET: 5; 325 TABLET ORAL at 08:49

## 2017-09-04 RX ADMIN — Medication 1 TABLET: at 08:49

## 2017-09-04 RX ADMIN — OXYCODONE HYDROCHLORIDE AND ACETAMINOPHEN 1 TABLET: 5; 325 TABLET ORAL at 18:01

## 2017-09-04 RX ADMIN — IBUPROFEN 800 MG: 800 TABLET, FILM COATED ORAL at 12:20

## 2017-09-04 RX ADMIN — OXYCODONE HYDROCHLORIDE AND ACETAMINOPHEN 1 TABLET: 10; 325 TABLET ORAL at 21:52

## 2017-09-04 RX ADMIN — IBUPROFEN 800 MG: 800 TABLET, FILM COATED ORAL at 03:12

## 2017-09-04 RX ADMIN — IBUPROFEN 800 MG: 800 TABLET, FILM COATED ORAL at 21:52

## 2017-09-04 RX ADMIN — POLYETHYLENE GLYCOL 3350 1 PACKET: 17 POWDER, FOR SOLUTION ORAL at 08:49

## 2017-09-04 RX ADMIN — OXYCODONE HYDROCHLORIDE AND ACETAMINOPHEN 1 TABLET: 5; 325 TABLET ORAL at 13:44

## 2017-09-04 ASSESSMENT — PAIN SCALES - GENERAL
PAINLEVEL_OUTOF10: 4
PAINLEVEL_OUTOF10: 4
PAINLEVEL_OUTOF10: 8
PAINLEVEL_OUTOF10: 4
PAINLEVEL_OUTOF10: 3
PAINLEVEL_OUTOF10: 5
PAINLEVEL_OUTOF10: 5
PAINLEVEL_OUTOF10: 3

## 2017-09-04 NOTE — CARE PLAN
Problem: Communication  Goal: The ability to communicate needs accurately and effectively will improve  Outcome: PROGRESSING AS EXPECTED  Pt. Ambulating, taking adequate PO fluids and voiding. All questions and concerns answered.     Problem: Pain Management  Goal: Pain level will decrease to patient's comfort goal  Outcome: PROGRESSING AS EXPECTED  Pt. Would like to keep PRN pain medication as scheduled.

## 2017-09-04 NOTE — CARE PLAN
Problem: Altered physiologic condition related to postoperative  delivery  Goal: Patient physiologically stable as evidenced by normal lochia, palpable uterine involution and vital signs within normal limits  Outcome: PROGRESSING AS EXPECTED  Patient has scant lochia with a firm palpable uterus.  Vital signs are within defined limits.  Assessment will continue.     Problem: Alteration in comfort related to surgical incision and/or after birth pains  Goal: Patient verbalizes acceptable pain level  Outcome: PROGRESSING AS EXPECTED  Patient will ask for pain medication when needed.  Pain assessment will continue.

## 2017-09-04 NOTE — PROGRESS NOTES
Post Partum Progress Note    Name:   Felisha Pedraza   Date/Time:  2017 - 6:46 AM  Chief Admitting Dx:  Pregnancy  39 WEEKS GESTATION  Labor and delivery indication for care or intervention  Delivery Type:   for breech, repeat, IUGR  Post-Op/Post Partum Days #:  2    Subjective:  Abdominal pain: no  Ambulating:   yes  Tolerating liquids:  yes  Tolerating food:  yes common adult  Flatus:   yes  BM:    no  Bleeding:   with a small amount of bleeding  Voiding:   yes  Dizziness:   no  Feeding:   breast    Vitals:    17 0100 17 0400 17 0800 17   BP:  (!) 92/62 (!) 90/57 (!) 86/57   Pulse: 75 68 80 76   Resp: 20 18 20 16   Temp:  37.1 °C (98.7 °F) 36.7 °C (98 °F) 36.6 °C (97.8 °F)   SpO2: 93% 96% 95% 94%   Weight:       Height:           Exam:  Breast: Tenderness no and Engorged no  Abdomen: Abdomen soft, non-tender. BS normal. No masses,  No organomegaly  Fundal Tenderness:  no  Fundus Firm: yes  Incision: no evidence of infection, separation or keloid formation.  Below umbilicus: yes  Perineum: perineum intact  Lochia: mild  Extremities: trace extremities, peripheral pulses and reflexes normal    Meds:  Current Facility-Administered Medications   Medication Dose   • polyethylene glycol/lytes (MIRALAX) PACKET 1 Packet  1 Packet   • ibuprofen (MOTRIN) tablet 800 mg  800 mg   • acetaminophen (TYLENOL) tablet 325 mg  325 mg   • oxycodone-acetaminophen (PERCOCET) 5-325 MG per tablet 1 Tab  1 Tab   • oxycodone-acetaminophen (PERCOCET-10)  MG per tablet 1 Tab  1 Tab   • ondansetron (ZOFRAN) syringe/vial injection 4 mg  4 mg    Or   • ondansetron (ZOFRAN ODT) dispertab 4 mg  4 mg   • LR infusion     • PRN oxytocin (PITOCIN) (20 Units/1000 mL) PRN for excessive uterine bleeding - See Admin Instr  125-999 mL/hr   • misoprostol (CYTOTEC) tablet 600 mcg  600 mcg   • methylergonovine (METHERGINE) injection 0.2 mg  0.2 mg   • docusate sodium (COLACE) capsule 100 mg  100 mg   •  prenatal plus vitamin (STUARTNATAL 1+1) 27-1 MG tablet 1 Tab  1 Tab   • simethicone (MYLICON) chewable tab 80 mg  80 mg       Labs:   Recent Labs      17   0805  17   1909   WBC  11.1*  17.2*   RBC  4.30  4.01*   HEMOGLOBIN  12.8  11.8*   HEMATOCRIT  37.9  35.1*   MCV  88.1  87.5   MCH  29.8  29.4   MCHC  33.8  33.6   RDW  43.3  41.6   PLATELETCT  176  199   MPV  12.4  12.3       Assessment:  Chief Admitting Dx:  Pregnancy  39 WEEKS GESTATION  Labor and delivery indication for care or intervention  Delivery Type:   for repeat  Tubal Ligation:  no    Plan:  Continue routine post partum care. Advance care, encourage ambulation, pain control, start Miralax per pt request, anticipate d/c home tomorrow, POD#3.    UMA Rice.

## 2017-09-04 NOTE — PROGRESS NOTES
0730Mom concerned about sore nipples and baby getting enough considering birthweight.  Reviewed weights, 5% loss is WNL.    Latched left side without pain. Reviewed latch and positioning. Discussed plan for next feeding.     0900 Mom had initiated pumping as too sore to feed.  Removed 5ml from each breast.   Weighed infant before feeding. 2.316, 6% loss, WNL  Offered infant 5ml, showed paced bottle feeding, fed easily.  Pacified back to sleep. Returned to crib.    Reviewed plan for offering breast, pumping and bottle feeding her milk.  Mom states she is anxious about breastfeeding given last experience.  She asked about nipple shield.  Discouraged use until milk is in and explained why.     Voices understanding of the plan with dad to support her.

## 2017-09-05 VITALS
HEART RATE: 100 BPM | HEIGHT: 61 IN | TEMPERATURE: 98.2 F | RESPIRATION RATE: 20 BRPM | DIASTOLIC BLOOD PRESSURE: 70 MMHG | OXYGEN SATURATION: 94 % | BODY MASS INDEX: 25.68 KG/M2 | WEIGHT: 136 LBS | SYSTOLIC BLOOD PRESSURE: 106 MMHG

## 2017-09-05 PROCEDURE — A9270 NON-COVERED ITEM OR SERVICE: HCPCS | Performed by: PHYSICIAN ASSISTANT

## 2017-09-05 PROCEDURE — 700102 HCHG RX REV CODE 250 W/ 637 OVERRIDE(OP): Performed by: STUDENT IN AN ORGANIZED HEALTH CARE EDUCATION/TRAINING PROGRAM

## 2017-09-05 PROCEDURE — 700102 HCHG RX REV CODE 250 W/ 637 OVERRIDE(OP): Performed by: PHYSICIAN ASSISTANT

## 2017-09-05 PROCEDURE — A9270 NON-COVERED ITEM OR SERVICE: HCPCS | Performed by: STUDENT IN AN ORGANIZED HEALTH CARE EDUCATION/TRAINING PROGRAM

## 2017-09-05 RX ORDER — IBUPROFEN 800 MG/1
800 TABLET ORAL EVERY 8 HOURS PRN
Qty: 30 TAB | Refills: 3 | Status: SHIPPED | OUTPATIENT
Start: 2017-09-05 | End: 2018-03-26

## 2017-09-05 RX ORDER — OXYCODONE HYDROCHLORIDE AND ACETAMINOPHEN 5; 325 MG/1; MG/1
1 TABLET ORAL EVERY 6 HOURS PRN
Qty: 30 TAB | Refills: 0 | Status: SHIPPED | OUTPATIENT
Start: 2017-09-05 | End: 2018-01-23

## 2017-09-05 RX ADMIN — POLYETHYLENE GLYCOL 3350 1 PACKET: 17 POWDER, FOR SOLUTION ORAL at 07:59

## 2017-09-05 RX ADMIN — IBUPROFEN 800 MG: 800 TABLET, FILM COATED ORAL at 12:05

## 2017-09-05 RX ADMIN — OXYCODONE HYDROCHLORIDE AND ACETAMINOPHEN 1 TABLET: 5; 325 TABLET ORAL at 12:05

## 2017-09-05 RX ADMIN — OXYCODONE HYDROCHLORIDE AND ACETAMINOPHEN 1 TABLET: 10; 325 TABLET ORAL at 02:32

## 2017-09-05 RX ADMIN — Medication 1 TABLET: at 07:59

## 2017-09-05 RX ADMIN — OXYCODONE HYDROCHLORIDE AND ACETAMINOPHEN 1 TABLET: 5; 325 TABLET ORAL at 07:59

## 2017-09-05 ASSESSMENT — PAIN SCALES - GENERAL
PAINLEVEL_OUTOF10: 4
PAINLEVEL_OUTOF10: 2
PAINLEVEL_OUTOF10: 1
PAINLEVEL_OUTOF10: 7
PAINLEVEL_OUTOF10: 4
PAINLEVEL_OUTOF10: 1

## 2017-09-05 NOTE — DISCHARGE SUMMARY
Discharge Summary:      Felisha Pedraza      Admit Date:   2017  Discharge Date:  2017     Admitting diagnosis:  Pregnancy  39 WEEKS GESTATION  Labor and delivery indication for care or intervention  Discharge Diagnosis: Status post  for repeat.  Pregnancy Complications: IUGR, breech  Tubal Ligation:  no        History:  Past Medical History:   Diagnosis Date   • Migraine    • Other specified symptom associated with female genital organs    • PCOS (polycystic ovarian syndrome)      OB History    Para Term  AB Living   2 1 1     1   SAB TAB Ectopic Molar Multiple Live Births             1      # Outcome Date GA Lbr Gideon/2nd Weight Sex Delivery Anes PTL Lv   2 Current            1 Term 08/03/15    F CS-Unspec   ONUR           Review of patient's allergies indicates no known allergies.  Patient Active Problem List    Diagnosis Date Noted   • AMA (advanced maternal age) multigravida 35+ 2017   • Previous  section complicating pregnancy 2017   • HSV infection 2017   • PCOS (polycystic ovarian syndrome) 2012        Hospital Course:   37 y.o. , now para 2, was admitted with the above mentioned diagnosis, underwent Repeat  repeat,  for repeat. Patient postpartum course was unremarkable, with progressive advancement in diet , ambulation and toleration of oral analgesia. Patient without complaints today and desires discharge.      Vitals:    17 0800 17 0800 17   BP: (!) 90/57 (!) 86/57 (!) 95/54 (!) 89/59   Pulse: 80 76 81 77   Resp: 20 16 18 20   Temp: 36.7 °C (98 °F) 36.6 °C (97.8 °F) 36.9 °C (98.4 °F) 37.3 °C (99.1 °F)   SpO2: 95% 94% 94% 95%   Weight:       Height:           Current Facility-Administered Medications   Medication Dose   • polyethylene glycol/lytes (MIRALAX) PACKET 1 Packet  1 Packet   • ibuprofen (MOTRIN) tablet 800 mg  800 mg   • acetaminophen (TYLENOL) tablet 325 mg  325 mg   •  oxycodone-acetaminophen (PERCOCET) 5-325 MG per tablet 1 Tab  1 Tab   • oxycodone-acetaminophen (PERCOCET-10)  MG per tablet 1 Tab  1 Tab   • ondansetron (ZOFRAN) syringe/vial injection 4 mg  4 mg    Or   • ondansetron (ZOFRAN ODT) dispertab 4 mg  4 mg   • LR infusion     • PRN oxytocin (PITOCIN) (20 Units/1000 mL) PRN for excessive uterine bleeding - See Admin Instr  125-999 mL/hr   • misoprostol (CYTOTEC) tablet 600 mcg  600 mcg   • methylergonovine (METHERGINE) injection 0.2 mg  0.2 mg   • docusate sodium (COLACE) capsule 100 mg  100 mg   • prenatal plus vitamin (STUARTNATAL 1+1) 27-1 MG tablet 1 Tab  1 Tab   • simethicone (MYLICON) chewable tab 80 mg  80 mg       Exam:  Breast Exam: negative  Abdomen: Abdomen soft, non-tender. BS normal. No masses,  No organomegaly  Fundus Non Tender: yes  Incision: dry and intact  Perineum: perineum intact  Extremity: extremities, peripheral pulses and reflexes normal     Labs:  Recent Labs      09/02/17   0805  09/02/17   1909   WBC  11.1*  17.2*   RBC  4.30  4.01*   HEMOGLOBIN  12.8  11.8*   HEMATOCRIT  37.9  35.1*   MCV  88.1  87.5   MCH  29.8  29.4   MCHC  33.8  33.6   RDW  43.3  41.6   PLATELETCT  176  199   MPV  12.4  12.3        Activity:   Discharge to home  Pelvic Rest x 6 weeks    Assessment:  normal postpartum course  Discharge Assessment: No breast redness or severe pain of breast     Follow up:   [facility]  1 week for incision check.      Discharge Meds:   Current Outpatient Prescriptions   Medication Sig Dispense Refill   • ibuprofen (MOTRIN) 800 MG Tab Take 1 Tab by mouth every 8 hours as needed. 30 Tab 3   • oxycodone-acetaminophen (PERCOCET) 5-325 MG Tab Take 1 Tab by mouth every 6 hours as needed (for Moderate Pain (Pain Scale 4-6) after delivery). 30 Tab 0       Jillian Benítez M.D.

## 2017-09-05 NOTE — DISCHARGE INSTRUCTIONS
POSTPARTUM DISCHARGE INSTRUCTIONS FOR MOM    YOB: 1980   Age: 37 y.o.               Admit Date: 2017     Discharge Date: 2017  Attending Doctor:  Sara Guzman M.D.                  Allergies:  Review of patient's allergies indicates no known allergies.    Discharged to home by car. Discharged via wheelchair, hospital escort: Yes.  Special equipment needed: Not Applicable  Belongings with: Personal  Be sure to schedule a follow-up appointment with your primary care doctor or any specialists as instructed.     Discharge Plan:   Diet Plan: Discussed  Activity Level: Discussed  Confirmed Follow up Appointment: Patient to Call and Schedule Appointment  Influenza Vaccine Indication: Patient Refuses    REASONS TO CALL YOUR OBSTETRICIAN:  1.   Persistent fever or shaking chills (Temperature higher than 100.4)  2.   Heavy bleeding (soaking more than 1 pad per hour); Passing clots  3.   Foul odor from vagina  4.   Mastitis (Breast infection; breast pain, chills, fever, redness)  5.   Urinary pain, burning or frequency  6.   Episiotomy infection  7.   Abdominal incision infection  8.   Severe depression longer than 24 hours    HAND WASHING  · Prior to handling the baby.  · Before breastfeeding or bottle feeding baby.  · After using the bathroom or changing the baby's diaper.    WOUND CARE  Ask your physician for additional care instructions.  In general:    ·  Incision:      · Keep clean and dry.    · Do NOT lift anything heavier than your baby for up to 6 weeks.    · There should not be any opening or pus.      VAGINAL CARE  · Nothing inside vagina for 6 weeks: no sexual intercourse, tampons or douching.  · Bleeding may continue for 2-4 weeks.  Amount may vary.    · Call your physician for heavy bleeding which means soaking more than 1 pad per hour    BIRTH CONTROL  · It is possible to become pregnant at any time after delivery and while breastfeeding.  · Plan to discuss a method of birth control  "with your physician at your follow up visit. visit.    DIET AND ELIMINATION  · Eating more fiber (bran cereal, fruits, and vegetables) and drinking plenty of fluids will help to avoid constipation.  · Urinary frequency after childbirth is normal.    POSTPARTUM BLUES  During the first few days after birth, you may experience a sense of the \"blues\" which may include impatience, irritability or even crying.  These feeling come and go quickly.  However, as many as 1 in 10 women experience emotional symptoms known as postpartum depression.    Postpartum depression:  May start as early as the second or third day after delivery or take several weeks or months to develop.  Symptoms of \"blues\" are present, but are more intense:  Crying spells; loss of appetite; feelings of hopelessness or loss of control; fear of touching the baby; over concern or no concern at all about the baby; little or no concern about your own appearance/caring for yourself; and/or inability to sleep or excessive sleeping.  Contact your physician if you are experiencing any of these symptoms.    Crisis Hotline:  · [place] Crisis Hotline:  0-602-KYWLJAW  Or 1-838.154.1598  · [place] Crisis Hotline:  1-452.821.7467  Or 442-837-0189    PREVENTING SHAKEN BABY:  If you are angry or stressed, PUT THE BABY IN THE CRIB, step away, take some deep breaths, and wait until you are calm to care for the baby.  DO NOT SHAKE THE BABY.  You are not alone, call a supporter for help.    · Crisis Call Center 24/7 crisis line 072-295-4333 or 1-170.193.3131  · You can also text them, text \"ANSWER\" to 058897    QUIT SMOKING/TOBACCO USE:  I understand the use of any tobacco products increases my chance of suffering from future heart disease and could cause other illnesses which may shorten my life. Quitting the use of tobacco products is the single most important thing I can do to improve my health. For further information on smoking / tobacco cessation call a Toll Free Quit " Line at 1-139.950.6687 (*National [employer] [place]) or 1-708.499.7893 (American Lung Association) or you can access the web based program at www.lungusa.org.    · [place] Tobacco Users Help Line:  (826) 725-9403       Toll Free: 1-744.319.2483  · Quit Tobacco Program [facility] (356)760-7234    DEPRESSION / SUICIDE RISK:  As you are discharged from this [facility], it is important to learn how to keep safe from harming yourself.    Recognize the warning signs:  · Abrupt changes in personality, positive or negative- including increase in energy   · Giving away possessions  · Change in eating patterns- significant weight changes-  positive or negative  · Change in sleeping patterns- unable to sleep or sleeping all the time   · Unwillingness or inability to communicate  · Depression  · Unusual sadness, discouragement and loneliness  · Talk of wanting to die  · Neglect of personal appearance   · Rebelliousness- reckless behavior  · Withdrawal from people/activities they love  · Confusion- inability to concentrate     If you or a loved one observes any of these behaviors or has concerns about self-harm, here's what you can do:  · Talk about it- your feelings and reasons for harming yourself  · Remove any means that you might use to hurt yourself (examples: pills, rope, extension cords, firearm)  · Get professional help from the community (Mental Health, Substance Abuse, psychological counseling)  · Do not be alone:Call your Safe Contact- someone whom you trust who will be there for you.  · Call your local CRISIS HOTLINE 665-7923 or 396-025-2643  · Call your local Children's Mobile Crisis Response Team [place] (173) 441-9581 or www.Toodalu  · Call the toll free National Suicide Prevention Hotlines   · National Suicide Prevention Lifeline 566-777-BBPG (3775)  · National Hope Line Network 800-SUICIDE (772-3361)    DISCHARGE SURVEY:  Thank you for choosing [facility].  We  hope we provided you with very good care.  You may be receiving a survey in the mail.  Please fill it out.  Your opinion is valuable to us.    ADDITIONAL EDUCATIONAL MATERIALS GIVEN TO PATIENT:        My signature on this form indicates that:  1.  I have reviewed and understand the above information  2.  My questions regarding this information have been answered to my satisfaction.  3.  I have formulated a plan with my discharge nurse to obtain my prescribed medication for home.

## 2017-09-05 NOTE — PROGRESS NOTES
Moms left nipple is scabbed and sore, red with no signs of infection.      Implemented a nipple shield (NS), 24mm, with ac/pc weight of 27ml.  Successful transfer of milk with NS.    Plan for home:   Offer both breasts with NS on the left side.  Pump after feeding and save for dad bottle late evening.  OP follow up end of week, parents to make appointment.

## 2017-09-05 NOTE — PROGRESS NOTES
Assumed care. Assessment completed. Fundus firm, lochia scant, ABD incision with staples dry, intact, no signs of infection. POC discussed, verbalized understanding. Patient will call if pain meds needed.

## 2017-09-05 NOTE — PROGRESS NOTES
Assessment done. Vital signs stable. Patient voiding without difficulty, claims to be passing flatus. Fundus firm at umbilicus with light lochia. Clips over low abdominal incision clean, dry, and intact. No redness, swelling, or drainage noted. Skin well approximated. Patient ambulating with steady gait. Patient claims to have good pain relief with p.o meds. Breastfeeding infant on demand. Family at bedside. Patient encouraged to call for any needs. Will continue to monitor.

## 2017-09-05 NOTE — CARE PLAN
Problem: Potential for postpartum infection related to surgical incision, compromised uterine condition, urinary tract or respiratory compromise  Goal: Patient will be afebrile and free from signs and symptoms of infection  Outcome: PROGRESSING AS EXPECTED  No signs of infection noted at time of assessment    Problem: Potential knowledge deficit related to lack of understanding of self and  care  Goal: Patient will verbalize understanding of self and infant care  Outcome: PROGRESSING AS EXPECTED  Post partum teaching complete

## 2017-09-05 NOTE — PROGRESS NOTES
Post partum teaching complete. Patient discharged home in stable condition. Patient claims all questions have been answered. Denies problems. Follow up instructions given.

## 2017-09-05 NOTE — CARE PLAN
Problem: Potential for postpartum infection related to surgical incision, compromised uterine condition, urinary tract or respiratory compromise  Goal: Patient will be afebrile and free from signs and symptoms of infection  Outcome: PROGRESSING AS EXPECTED  No signs or symptoms of infection noted or reported.     Problem: Alteration in comfort related to surgical incision and/or after birth pains  Goal: Patient is able to ambulate, care for self and infant with acceptable pain level  Outcome: PROGRESSING AS EXPECTED  Patient declines pain at this time. Ambulating, caring for self and infant.

## 2017-09-29 ENCOUNTER — POST PARTUM (OUTPATIENT)
Dept: OBGYN | Facility: CLINIC | Age: 37
End: 2017-09-29
Payer: COMMERCIAL

## 2017-09-29 VITALS — DIASTOLIC BLOOD PRESSURE: 62 MMHG | SYSTOLIC BLOOD PRESSURE: 122 MMHG | WEIGHT: 122 LBS | BODY MASS INDEX: 23.05 KG/M2

## 2017-09-29 DIAGNOSIS — Z51.89 VISIT FOR WOUND CHECK: ICD-10-CM

## 2017-09-29 PROCEDURE — 90050 PR POSTPARTUM VISIT: CPT | Performed by: OBSTETRICS & GYNECOLOGY

## 2017-09-29 NOTE — PROGRESS NOTES
Felisha Pedraza Is a 37 y.o.  status post  delivery on 17. Patient is here today for an incision check. Currently the patient is without complaints.  She reports Normal  BM.  Normal  appetite without nausea and vomiting. She denies fever. Pain is under good control.  Breast feeding.  Minimal lochia.    /62   Wt 55.3 kg (122 lb)   Breastfeeding? Yes   BMI 23.05 kg/m²   ABD Abdomen soft, non-tender. BS normal. No masses or organomegaly  Incision healing normally, dry and intact      Assessment and plan  Status post  delivery  No complications incision healing well  Followup in 4 weeks for final postpartum checkup  Wants norethindrone for contraception, as it improves her migraines. Will start in one month.

## 2017-10-20 ENCOUNTER — POST PARTUM (OUTPATIENT)
Dept: OBGYN | Facility: CLINIC | Age: 37
End: 2017-10-20
Payer: COMMERCIAL

## 2017-10-20 VITALS — WEIGHT: 123 LBS | SYSTOLIC BLOOD PRESSURE: 114 MMHG | BODY MASS INDEX: 23.24 KG/M2 | DIASTOLIC BLOOD PRESSURE: 66 MMHG

## 2017-10-20 PROCEDURE — 90050 PR POSTPARTUM VISIT: CPT | Performed by: OBSTETRICS & GYNECOLOGY

## 2017-10-20 RX ORDER — ACETAMINOPHEN AND CODEINE PHOSPHATE 120; 12 MG/5ML; MG/5ML
1 SOLUTION ORAL DAILY
Qty: 28 TAB | Refills: 13 | Status: SHIPPED | OUTPATIENT
Start: 2017-10-20 | End: 2018-01-23

## 2017-10-20 NOTE — PROGRESS NOTES
Felisha Pedraza Is a 37 y.o.  status post repeat C/S on 17. Patient is here today for postpartum check. Currently the patient is without complaints.  She reports Normal  BM.  Normal  appetite without nausea and vomiting. She denies fever. Pain is under good control.  Breast feeding.  Vaginal bleeding resolved. Mood stable.    /66   Wt 55.8 kg (123 lb)   Breastfeeding? Yes   BMI 23.24 kg/m²   GENERAL: Alert, in no apparent distress  PSYCHIATRIC: Appropriate affect, intact insight and judgement.  NECK:  Nontender, no masses.  No Thyromegaly or nodules. No lymphadenopathy.  ABDOMEN: Soft, nontender, nondistended.  No palpable masses.  No rebound or guarding.  No inguinal lymphadenopathy.  No hepatosplenomegaly.  No hernias. Incision well healed.   BACK: No CVA tenderness  EXTREMITIES: No edema  SKIN: No rash    GENITOURINARY:  Normal external genitalia, no lesions.  Normal urethral meatus, no masses or tenderness.  Normal bladder without fullness or masses.  Vagina well estrogenized, no vaginal discharge or lesions.  Cervix without lesions or discharge, nontender.  Uterus normal size, shape, and contour, nontender.  Adnexa nontender, no masses.  Normal anus and perineum.    Rectal Exam - not indicated.        Assessment and Plan  Status post spontaneous vaginal delivery  Contraception - Micronor.  Info given on Mirena.  Pap current

## 2018-01-23 ENCOUNTER — OFFICE VISIT (OUTPATIENT)
Dept: INTERNAL MEDICINE | Facility: MEDICAL CENTER | Age: 38
End: 2018-01-23
Payer: COMMERCIAL

## 2018-01-23 ENCOUNTER — HOSPITAL ENCOUNTER (OUTPATIENT)
Dept: LAB | Facility: MEDICAL CENTER | Age: 38
End: 2018-01-23
Attending: STUDENT IN AN ORGANIZED HEALTH CARE EDUCATION/TRAINING PROGRAM
Payer: COMMERCIAL

## 2018-01-23 VITALS
HEART RATE: 100 BPM | SYSTOLIC BLOOD PRESSURE: 122 MMHG | DIASTOLIC BLOOD PRESSURE: 84 MMHG | OXYGEN SATURATION: 100 % | TEMPERATURE: 99 F | BODY MASS INDEX: 21.01 KG/M2 | HEIGHT: 60 IN | WEIGHT: 107 LBS

## 2018-01-23 DIAGNOSIS — E28.2 PCOS (POLYCYSTIC OVARIAN SYNDROME): ICD-10-CM

## 2018-01-23 DIAGNOSIS — L65.9 HAIR LOSS: ICD-10-CM

## 2018-01-23 DIAGNOSIS — Z00.00 HEALTH MAINTENANCE EXAMINATION: ICD-10-CM

## 2018-01-23 DIAGNOSIS — F41.0 PANIC DISORDER: ICD-10-CM

## 2018-01-23 LAB
25(OH)D3 SERPL-MCNC: 32 NG/ML (ref 30–100)
ALBUMIN SERPL BCP-MCNC: 4.5 G/DL (ref 3.2–4.9)
ALBUMIN/GLOB SERPL: 1.6 G/DL
ALP SERPL-CCNC: 58 U/L (ref 30–99)
ALT SERPL-CCNC: 16 U/L (ref 2–50)
ANION GAP SERPL CALC-SCNC: 7 MMOL/L (ref 0–11.9)
AST SERPL-CCNC: 17 U/L (ref 12–45)
BASOPHILS # BLD AUTO: 2.1 % (ref 0–1.8)
BASOPHILS # BLD: 0.12 K/UL (ref 0–0.12)
BILIRUB SERPL-MCNC: 0.8 MG/DL (ref 0.1–1.5)
BUN SERPL-MCNC: 12 MG/DL (ref 8–22)
CALCIUM SERPL-MCNC: 10 MG/DL (ref 8.5–10.5)
CHLORIDE SERPL-SCNC: 107 MMOL/L (ref 96–112)
CHOLEST SERPL-MCNC: 168 MG/DL (ref 100–199)
CO2 SERPL-SCNC: 27 MMOL/L (ref 20–33)
CREAT SERPL-MCNC: 0.71 MG/DL (ref 0.5–1.4)
EOSINOPHIL # BLD AUTO: 0.04 K/UL (ref 0–0.51)
EOSINOPHIL NFR BLD: 0.7 % (ref 0–6.9)
ERYTHROCYTE [DISTWIDTH] IN BLOOD BY AUTOMATED COUNT: 40.3 FL (ref 35.9–50)
EST. AVERAGE GLUCOSE BLD GHB EST-MCNC: 97 MG/DL
FSH SERPL-ACNC: 8.3 MIU/ML
GLOBULIN SER CALC-MCNC: 2.8 G/DL (ref 1.9–3.5)
GLUCOSE SERPL-MCNC: 91 MG/DL (ref 65–99)
HBA1C MFR BLD: 5 % (ref 0–5.6)
HCT VFR BLD AUTO: 46.6 % (ref 37–47)
HDLC SERPL-MCNC: 64 MG/DL
HGB BLD-MCNC: 15 G/DL (ref 12–16)
IMM GRANULOCYTES # BLD AUTO: 0.01 K/UL (ref 0–0.11)
IMM GRANULOCYTES NFR BLD AUTO: 0.2 % (ref 0–0.9)
LDLC SERPL CALC-MCNC: 94 MG/DL
LH SERPL-ACNC: 9 IU/L
LYMPHOCYTES # BLD AUTO: 1.26 K/UL (ref 1–4.8)
LYMPHOCYTES NFR BLD: 21.5 % (ref 22–41)
MCH RBC QN AUTO: 28.1 PG (ref 27–33)
MCHC RBC AUTO-ENTMCNC: 32.2 G/DL (ref 33.6–35)
MCV RBC AUTO: 87.3 FL (ref 81.4–97.8)
MONOCYTES # BLD AUTO: 0.46 K/UL (ref 0–0.85)
MONOCYTES NFR BLD AUTO: 7.9 % (ref 0–13.4)
NEUTROPHILS # BLD AUTO: 3.96 K/UL (ref 2–7.15)
NEUTROPHILS NFR BLD: 67.6 % (ref 44–72)
NRBC # BLD AUTO: 0 K/UL
NRBC BLD-RTO: 0 /100 WBC
PLATELET # BLD AUTO: 235 K/UL (ref 164–446)
PMV BLD AUTO: 13.3 FL (ref 9–12.9)
POTASSIUM SERPL-SCNC: 3.8 MMOL/L (ref 3.6–5.5)
PROT SERPL-MCNC: 7.3 G/DL (ref 6–8.2)
RBC # BLD AUTO: 5.34 M/UL (ref 4.2–5.4)
SODIUM SERPL-SCNC: 141 MMOL/L (ref 135–145)
TRIGL SERPL-MCNC: 52 MG/DL (ref 0–149)
TSH SERPL DL<=0.005 MIU/L-ACNC: 0.81 UIU/ML (ref 0.38–5.33)
WBC # BLD AUTO: 5.9 K/UL (ref 4.8–10.8)

## 2018-01-23 PROCEDURE — 84270 ASSAY OF SEX HORMONE GLOBUL: CPT

## 2018-01-23 PROCEDURE — 99204 OFFICE O/P NEW MOD 45 MIN: CPT | Mod: GC | Performed by: INTERNAL MEDICINE

## 2018-01-23 PROCEDURE — 84403 ASSAY OF TOTAL TESTOSTERONE: CPT

## 2018-01-23 PROCEDURE — 36415 COLL VENOUS BLD VENIPUNCTURE: CPT

## 2018-01-23 PROCEDURE — 83001 ASSAY OF GONADOTROPIN (FSH): CPT

## 2018-01-23 PROCEDURE — 85025 COMPLETE CBC W/AUTO DIFF WBC: CPT

## 2018-01-23 PROCEDURE — 84443 ASSAY THYROID STIM HORMONE: CPT

## 2018-01-23 PROCEDURE — 83002 ASSAY OF GONADOTROPIN (LH): CPT

## 2018-01-23 PROCEDURE — 80053 COMPREHEN METABOLIC PANEL: CPT

## 2018-01-23 PROCEDURE — 83036 HEMOGLOBIN GLYCOSYLATED A1C: CPT

## 2018-01-23 PROCEDURE — 80061 LIPID PANEL: CPT

## 2018-01-23 PROCEDURE — 82306 VITAMIN D 25 HYDROXY: CPT

## 2018-01-23 RX ORDER — LORAZEPAM 0.5 MG/1
0.5 TABLET ORAL
Qty: 30 TAB | Refills: 0 | Status: SHIPPED | OUTPATIENT
Start: 2018-01-23 | End: 2018-02-22

## 2018-01-23 RX ORDER — SPIRONOLACTONE 25 MG/1
25 TABLET ORAL DAILY
Qty: 30 TAB | Refills: 3 | Status: SHIPPED | OUTPATIENT
Start: 2018-01-23 | End: 2018-05-19 | Stop reason: SDUPTHER

## 2018-01-23 ASSESSMENT — ENCOUNTER SYMPTOMS
DOUBLE VISION: 0
SPUTUM PRODUCTION: 0
SORE THROAT: 0
COUGH: 0
POLYDIPSIA: 0
NECK PAIN: 0
PHOTOPHOBIA: 0
NERVOUS/ANXIOUS: 1
FEVER: 0
EYE PAIN: 0
PALPITATIONS: 1
BACK PAIN: 1
SENSORY CHANGE: 0
BLURRED VISION: 0
DEPRESSION: 0
NAUSEA: 0
ABDOMINAL PAIN: 0
STRIDOR: 0
DIARRHEA: 0
BLOOD IN STOOL: 0
WEIGHT LOSS: 0
EYE DISCHARGE: 0
MYALGIAS: 0
FALLS: 0
SHORTNESS OF BREATH: 0
HALLUCINATIONS: 0
EYE REDNESS: 0
INSOMNIA: 1
TINGLING: 0
PND: 0
WEAKNESS: 0
VOMITING: 0
FOCAL WEAKNESS: 0
HEARTBURN: 0
DIZZINESS: 0
WHEEZING: 0
HEMOPTYSIS: 0
MEMORY LOSS: 0
HEADACHES: 0
SPEECH CHANGE: 0
SEIZURES: 0
CONSTIPATION: 0
BRUISES/BLEEDS EASILY: 0
TREMORS: 0
DIAPHORESIS: 1
FLANK PAIN: 0
CLAUDICATION: 0
CHILLS: 0
ORTHOPNEA: 0
LOSS OF CONSCIOUSNESS: 0

## 2018-01-23 ASSESSMENT — PATIENT HEALTH QUESTIONNAIRE - PHQ9: CLINICAL INTERPRETATION OF PHQ2 SCORE: 0

## 2018-01-23 ASSESSMENT — LIFESTYLE VARIABLES: SUBSTANCE_ABUSE: 0

## 2018-01-23 NOTE — PROGRESS NOTES
New Patient to Establish    Reason to establish: New patient to establish    CC: New patient establishment, anxiety and hair loss    HPI: Mrs. Pedraza is a pleasant 37 years old female with past medical history of polycystic ovary syndrome and HSV presented to the clinic today to establish care as a new patient, complaining of anxiety and hair loss. She was diagnosed with polycystic ovary syndrome more than 8 years ago, was on oral contraceptive pills, started metformin to get pregnant, gave birth 4 months ago, .   Patient complaining of hair loss started 2 1/2 months ago, after giving birth, hair loss mostly from the sides, patient is describing her hair is less than her baseline before pregnancy, she had hair loss after giving birth to the first baby but was minor hair loss compared to now.  Also she is complaining of anxiety since giving birth, associated with palpitation, trouble breathing and feeling hot, patient had 2 attacks last week, associated with difficulty falling asleep, denies skin and bowel changes, she is not breast-feeding, works as a teacher, sleeps 5-6 hours per day intermittently.         Patient Active Problem List    Diagnosis Date Noted   • AMA (advanced maternal age) multigravida 35+ 2017   • Previous  section complicating pregnancy 2017   • HSV infection 2017   • PCOS (polycystic ovarian syndrome) 2012       Past Medical History:   Diagnosis Date   • Migraine    • Other specified symptom associated with female genital organs    • PCOS (polycystic ovarian syndrome)        Current Outpatient Prescriptions   Medication Sig Dispense Refill   • lorazepam (ATIVAN) 0.5 MG Tab Take 1 Tab by mouth 1 time daily as needed for Anxiety (panic attacks) for up to 30 days. 30 Tab 0   • spironolactone (ALDACTONE) 25 MG Tab Take 1 Tab by mouth every day. 30 Tab 3   • ibuprofen (MOTRIN) 800 MG Tab Take 1 Tab by mouth every 8 hours as needed. 30 Tab 3   • acyclovir (ZOVIRAX)  400 MG tablet Take 1 Tab by mouth 3 times a day. 30 Tab 0   • Buprenorphine HCl-Naloxone HCl (SUBOXONE) 8-2 MG FILM Place 4 mg under tongue every day. [facility] # [id number]    [facility] #  [id number] 10 Each 0     No current facility-administered medications for this visit.        Allergies as of 01/23/2018   • (No Known Allergies)       Social History     Social History   • Marital status:      Spouse name: N/A   • Number of children: N/A   • Years of education: N/A     Occupational History   • Not on file.     Social History Main Topics   • Smoking status: Former Smoker     Packs/day: 0.10     Years: 10.00     Types: Cigarettes   • Smokeless tobacco: Never Used   • Alcohol use Yes      Comment: 2 per week   • Drug use: No      Comment: Prior history greater than 5 years ago.   • Sexual activity: Yes     Partners: Male     Birth control/ protection: Pill     Other Topics Concern   • Not on file     Social History Narrative   • No narrative on file       Family History   Problem Relation Age of Onset   • Thyroid Mother      hyperthyroidsim    • Cancer Father    • Hypertension Father        Past Surgical History:   Procedure Laterality Date   • REPEAT C SECTION  9/2/2017    Procedure: REPEAT C SECTION;  Surgeon: Sara Guzman M.D.;  Location: LABOR AND DELIVERY;  Service: Labor and Delivery   • PELVISCOPY  4/3/2013    Performed by Shin Mayo M.D. at SURGERY SAME DAY [facility] ORS   • CYST EXCISION  4/3/2013    Performed by Shin Mayo M.D. at SURGERY SAME DAY [facility] ORS   • OTHER ORTHOPEDIC SURGERY  2011    clavical right       ROS: As per HPI. Additional pertinent symptoms as noted below.  Review of Systems   Constitutional: Positive for diaphoresis. Negative for chills, fever, malaise/fatigue and weight loss.   HENT: Negative for congestion, ear discharge, ear pain, hearing loss, nosebleeds, sore throat and tinnitus.    Eyes: Negative for blurred vision, double vision, photophobia, pain,  discharge and redness.   Respiratory: Negative for cough, hemoptysis, sputum production, shortness of breath, wheezing and stridor.    Cardiovascular: Positive for palpitations. Negative for chest pain, orthopnea, claudication, leg swelling and PND.   Gastrointestinal: Negative for abdominal pain, blood in stool, constipation, diarrhea, heartburn, melena, nausea and vomiting.   Genitourinary: Negative for dysuria, flank pain, frequency, hematuria and urgency.   Musculoskeletal: Positive for back pain. Negative for falls, joint pain, myalgias and neck pain.   Skin: Negative for itching and rash.   Neurological: Negative for dizziness, tingling, tremors, sensory change, speech change, focal weakness, seizures, loss of consciousness, weakness and headaches.   Endo/Heme/Allergies: Negative for polydipsia. Does not bruise/bleed easily.   Psychiatric/Behavioral: Negative for depression, hallucinations, memory loss, substance abuse and suicidal ideas. The patient is nervous/anxious and has insomnia.            /84   Pulse 100   Temp 37.2 °C (99 °F)   Ht 1.524 m (5')   Wt 48.5 kg (107 lb)   SpO2 100%   Breastfeeding? No   BMI 20.90 kg/m²     Physical Exam  Constitutional: Oriented to person, place, and time and well-developed, well-nourished, and in no distress. .   HENT:   Decreased hair bilaterally frontally  Head: Normocephalic and atraumatic.   Mouth/Throat: Oropharynx is clear and moist.   Eyes: Conjunctivae are normal.   Neck: Neck supple. No JVD present. No tracheal deviation present.   Cardiovascular:  Rate 100.  Exam reveals no gallop and no friction rub.    No murmur heard.  Pulmonary/Chest: Effort normal and breath sounds normal. No respiratory distress. She has no wheezes. She has no rales. She exhibits no tenderness.   Abdominal: Soft. Bowel sounds are normal. She exhibits no mass. There is no tenderness. There is no rebound and no guarding.   Musculoskeletal: Normal range of motion. She  exhibits no edema.   Lymphadenopathy:     She has no cervical adenopathy.   Neurological: She is alert and oriented to person, place, and time. She has normal strength. Gait normal.    Skin: No rash noted. No erythema. No pallor.         Assessment and Plan    1. Health maintenance examination  37 y.o.female is here for preventive health visit, patient preventive services needs include     Cancer:    · Lung cancer: non smoker no screening indicated   · Breast Cancer: Not indicated at this age.   · PAP Smear: last PAP smear was done in 2016,  was normal , next PAP 2021   · Colon cancer: last Colonoscopy Not indicated at this age.    Comments:  Patient is up-to-date on her cancer screening    Metabolic history:    · coronary artery disease and stroke: Order this visit lipid profile    · Patient blood pressure is normal   Comments: Patient has polycystic ovary syndrome which put her at increased risk, ordered lipid profile and hemoglobin A1c  lifestyle and functioning:   · Smoking: patient is nonsmoker    · Physical activity: Does not exercise    · Alcohol use: One drink per day   · Obesity: patient BMI is 20   Comments:   Patient was advised for lifestyle modification including exercising 30 minutes daily, quit drinking alcohol. Ordered vitamin D level  Infections:    · HCV: Not tested, low risk      · HBV: Immunization up-to-date     · HIV:  patient was screened before, low risk, screening not recommended     · Syphilis, Chlamydia and Gonorrhea: No history of sexually transmitted disease, low risk, secreting not recommended  Comments:  Patient will risk of sexually and a blood borne transmitted disease     Behavioral:    · Depression: PHQ2 score 0   Comments:    Immunization:   · PNA: No indicated     · influenza vaccination:given in 9/2017    · DTaP: Last dose received in 2017    · Zoster vaccine: Not indicated at this age   · HBV:  patient was immunized      · HAV: immunized      Comments:  Low-risk of  depression        2. Hair loss  37 years old female with history of polycystic ovary syndrome, status post delivery complaining hair loss for 2 1/2, hair loss mostly from the sides, patient describing her hair is less than her baseline before pregnancy, also she is complaining of palpitation and anxiety.   Plan:   - Differential diagnosis of patient hair loss could be postpartum hair loss, polycystic ovary syndrome related hyperandrogenism or thyroid dysfunction, we'll workup the patient for a diagnoses and will start her on antiandrogen med.  - order TSH, testosterone, sex hormone binding globulin, FSH/LH  - Check CMP  - Start spironolactone 25 mg once daily after a normal potassium level.  - Might consider increasing the spironolactone does in the next visit if the patient can Tolerate   - Restart OCPs  - Follow-up in 6 weeks  - Referral to dermatology     3. Panic disorder  37 years old female with anxiety for 4 months, associated with palpitation, trouble breathing and feeling hot, patient had 2 attacks last week, associated with difficulty falling asleep, denies skin and bowel changes, she is not breast-feeding, sleeps 5-6 hours per day intermittently.  Plan:  - Patient most likely having panic disorder, she is having panic attacks which is affecting her life quality, concerned about having another attacks, hyperthyroidism is also a possible differential diagnoses, will rule out other causes  - Ordered TSH  - Prescribed lorazepam 0.5 mg daily when necessary  - Discussed with the patient that lorazepam is a temporary relieve to her symptoms, if other causes ruled out, she will need to start SSRI, informed the patient that I will not renew the prescription.  - Referral to psychology for cognitive behavioral therapy.  - Follow-up in 6 weeks    4. PCOS (polycystic ovarian syndrome)  37 years old female diagnosed with polycystic ovary syndrome more than 8 years ago, was on oral contraceptive pills, started metformin  to get pregnant, gave birth 4 months ago, .   Plan:   - Ordered FSH/LH, testosterone, sex hormone binding globulin  - Restart OCPs  - Ordered metabolic screening: Lipid profile and hemoglobin A1c  -Follow-up in 6 weeks      Followup: Return in about 6 weeks (around 3/6/2018).    Signed by: Cleve Mendoza M.D.    Annual wellness visit Population [place] 55 to 64

## 2018-01-23 NOTE — PATIENT INSTRUCTIONS
- please do the labs   - please follow up in 6 weeks    - please follow up referrals     Panic Attacks  Panic attacks are sudden, short feelings of great fear or discomfort. You may have them for no reason when you are relaxed, when you are uneasy (anxious), or when you are sleeping.   HOME CARE  · Take all your medicines as told.  · Check with your doctor before starting new medicines.  · Keep all doctor visits.  GET HELP IF:  · You are not able to take your medicines as told.  · Your symptoms do not get better.  · Your symptoms get worse.  GET HELP RIGHT AWAY IF:  · Your attacks seem different than your normal attacks.  · You have thoughts about hurting yourself or others.  · You take panic attack medicine and you have a side effect.  MAKE SURE YOU:  · Understand these instructions.  · Will watch your condition.  · Will get help right away if you are not doing well or get worse.     This information is not intended to replace advice given to you by your health care provider. Make sure you discuss any questions you have with your health care provider.     Document Released: 01/20/2012 Document Revised: 10/08/2014 Document Reviewed: 08/01/2014  [employer] Interactive Patient Education ©2016 [employer]

## 2018-01-27 LAB
SHBG SERPL-SCNC: 119 NMOL/L (ref 30–135)
TESTOST FREE SERPL-MCNC: 2.7 PG/ML (ref 1.3–9.2)
TESTOST SERPL-MCNC: 40 NG/DL (ref 9–55)
TESTOSTERONE.FREE+WB SERPL-MCNC: 7.9 NG/DL (ref 4.1–25.5)

## 2018-01-29 DIAGNOSIS — E28.2 PCOS (POLYCYSTIC OVARIAN SYNDROME): ICD-10-CM

## 2018-01-29 DIAGNOSIS — L65.9 HAIR LOSS: ICD-10-CM

## 2018-01-29 DIAGNOSIS — F41.0 PANIC DISORDER: ICD-10-CM

## 2018-03-26 ENCOUNTER — OFFICE VISIT (OUTPATIENT)
Dept: INTERNAL MEDICINE | Facility: MEDICAL CENTER | Age: 38
End: 2018-03-26
Payer: COMMERCIAL

## 2018-03-26 VITALS
HEART RATE: 80 BPM | BODY MASS INDEX: 19.63 KG/M2 | SYSTOLIC BLOOD PRESSURE: 103 MMHG | TEMPERATURE: 98.2 F | DIASTOLIC BLOOD PRESSURE: 72 MMHG | WEIGHT: 104 LBS | HEIGHT: 61 IN | OXYGEN SATURATION: 100 %

## 2018-03-26 DIAGNOSIS — B00.9 HSV INFECTION: ICD-10-CM

## 2018-03-26 DIAGNOSIS — L65.9 HAIR LOSS: ICD-10-CM

## 2018-03-26 PROCEDURE — 99214 OFFICE O/P EST MOD 30 MIN: CPT | Mod: GC | Performed by: INTERNAL MEDICINE

## 2018-03-26 RX ORDER — ACYCLOVIR 400 MG/1
400 TABLET ORAL 3 TIMES DAILY
Qty: 30 TAB | Refills: 0 | Status: SHIPPED | OUTPATIENT
Start: 2018-03-26 | End: 2018-06-13

## 2018-03-26 NOTE — PROGRESS NOTES
Established Patient    Felisha presents today with the following:    CC: Follow up hair loss and HSV infection     HPI: Mrs. Pedraza is a pleasant 37 years old female with past medical history of polycystic ovary syndrome and HSV presented to the clinic today to follow up her hair loss and requesting a refill for her Zovirax. She was diagnosed with polycystic ovary syndrome more than 8 years ago, was on oral contraceptive pills, .   Hair loss: started  after giving birth, mostly from the sides, her hair is less than her baseline before pregnancy, TSH, FSH, LH, testosterone and FSH/LH ratio were within normal, she was started on spironolactone 25 mg daily 6 weeks ago, since starting the spironolactone she is reporting increasing hair growth, she has a follow-up appointment scheduled with dermatology next month, she is complaining of episodes of dizziness when she suddenly standup, about twice a week, no history of falls and it's not affecting the patient life quality.  HSV: She has a history of genital HSV, on Zovirax, she used to get the sores 3-4 times per year, she is requesting a refill for Zovirax, last time ordered by her gynecologist.    Patient Active Problem List    Diagnosis Date Noted   • AMA (advanced maternal age) multigravida 35+ 2017   • Previous  section complicating pregnancy 2017   • HSV infection 2017   • PCOS (polycystic ovarian syndrome) 2012       Current Outpatient Prescriptions   Medication Sig Dispense Refill   • spironolactone (ALDACTONE) 25 MG Tab Take 1 Tab by mouth every day. 30 Tab 3   • ibuprofen (MOTRIN) 800 MG Tab Take 1 Tab by mouth every 8 hours as needed. 30 Tab 3   • acyclovir (ZOVIRAX) 400 MG tablet Take 1 Tab by mouth 3 times a day. 30 Tab 0   • Buprenorphine HCl-Naloxone HCl (SUBOXONE) 8-2 MG FILM Place 4 mg under tongue every day. DRUG ADDICTION AGENCY # [id number]    [facility] #  [id number] 10 Each 0     No current facility-administered  medications for this visit.        Social History     Social History   • Marital status:      Spouse name: N/A   • Number of children: N/A   • Years of education: N/A     Occupational History   • Not on file.     Social History Main Topics   • Smoking status: Former Smoker     Packs/day: 0.10     Years: 10.00     Types: Cigarettes   • Smokeless tobacco: Never Used   • Alcohol use Yes      Comment: 2 per week   • Drug use: No      Comment: Prior history greater than 5 years ago.   • Sexual activity: Yes     Partners: Male     Birth control/ protection: Pill     Other Topics Concern   • Not on file     Social History Narrative   • No narrative on file       Family History   Problem Relation Age of Onset   • Thyroid Mother      hyperthyroidsim    • Cancer Father    • Hypertension Father        ROS: As per HPI. Additional pertinent symptoms as noted below.  Review of Systems   Constitutional: Negative for fever, chills, weight loss, malaise/fatigue and diaphoresis.   HENT: Negative for ear discharge, ear pain, hearing loss and tinnitus.    Eyes: Negative for blurred vision, double vision, pain, discharge and redness.   Respiratory: Negative for cough, hemoptysis, sputum production, shortness of breath and wheezing.    Cardiovascular: Negative for chest pain, palpitations, orthopnea, leg swelling and PND.   Gastrointestinal: Negative for heartburn, nausea, vomiting, abdominal pain, diarrhea, constipation and blood in stool.   Genitourinary: Negative for dysuria, urgency, frequency, hematuria and flank pain.   Musculoskeletal: Negative for myalgias, back pain, joint pain and neck pain.   Skin: Negative for itching and rash.   Neurological: Negative for dizziness, tingling, tremors, sensory change, focal weakness, loss of consciousness, weakness and headaches.   Psychiatric/Behavioral: Negative for depression and suicidal ideas. The patient is not nervous/anxious and does not have insomnia.        /70   Pulse  "74   Temp 36.8 °C (98.2 °F)   Ht 1.549 m (5' 1\")   Wt 47.2 kg (104 lb)   SpO2 100%   BMI 19.65 kg/m²    Physical Exam   Constitutional: Oriented to person, place, and time and well-developed, well-nourished, and in no distress.    HENT:   Head: Normocephalic and atraumatic.   Mouth/Throat: Oropharynx is clear and moist.   Eyes: Conjunctivae are normal.   Neck: Neck supple. No JVD present. No tracheal deviation present.   Cardiovascular: Normal rate.  Exam reveals no gallop and no friction rub.    No murmur heard.  Pulmonary/Chest: Effort normal and breath sounds normal. No respiratory distress. She has no wheezes. She has no rales. She exhibits no tenderness.   Abdominal: Soft. Bowel sounds are normal. She exhibits no mass. There is no tenderness. There is no rebound and no guarding.   Musculoskeletal: Normal range of motion. She exhibits no edema.   Lymphadenopathy:     She has no cervical adenopathy.   Neurological: She is alert and oriented to person, place, and time. She has normal strength and normal reflexes. Gait normal. GCS score is 15.   Skin: No rash noted. No erythema. No pallor. Hair increasing compared to the last visit with dandruff         Assessment and Plan    1. HSV infection  37 years old female has a history of genital HSV, on Zovirax, she used to get the sores 3-4 times per year, she is requesting a refill for Zovirax, last time ordered by her gynecologist.  Plan:  - order Zovirax 400 mg 3 times a day    2. Hair loss  Hx:37 years old female with history of polycystic ovary syndrome, complaining of hair loss started  after giving birth, mostly from the sides, her hair is less than her baseline before pregnancy, TSH, FSH, LH, testosterone and FSH/LH ratio were within normal, she was started on spironolactone 25 mg daily 6 weeks ago, she is reporting increasing hair growth, she has a follow-up appointment scheduled with dermatology next month, she is complaining of episodes of dizziness when " she suddenly standup, about twice a week.   - Orthostatic negative  Plan:   - Differential diagnosis of patient hair loss could be postpartum hair loss,   - Continue spironolactone 25 mg daily  - re-Check BMP  - Instructed patient to increase fluid and salt intake  - Might consider increasing the spironolactone does in the next visit if the patient can Tolerate   - Continue OCPs  - Follow-up in 8 weeks  - She has appointment with dermatology next month           Signed by: Cleve Mendoza M.D.

## 2018-03-26 NOTE — PATIENT INSTRUCTIONS
- please do the labs   - please follow up with dermatology   Selenium Sulfide shampoo  What is this medicine?  SELENIUM SULFIDE (se AKIRA pattersone um suhl fahyd) shampoo is used to treat dandruff, fungus infections on the scalp and skin and seborrhea of the scalp.  This medicine may be used for other purposes; ask your health care provider or pharmacist if you have questions.  COMMON BRAND NAME(S): Anti-Dandruff, Dandrex, Selenos, SelRx, Selseb, Selsun, Selsun Blue  What should I tell my health care provider before I take this medicine?  They need to know if you have any of these conditions:  -any open or inflamed areas of the skin  -an unusual or allergic reaction to selenium sulfide, other medicines, foods, dyes, or preservatives  -pregnant or trying to get pregnant  -breast-feeding  How should I use this medicine?  This medicine is for external use only. Do not take by mouth. Shake well before using. Follow the directions on the prescription label. Wash your hands before and after use. Do not get this medicine in the eyes. If you do, rinse the eyes out with plenty of cool tap water. Avoid use of this medicine in the genital area. Do not use on inflamed or broken skin. Do not use your medicine more often than directed or for a longer period of time than ordered by your doctor or health care professional. To do so may increase the chance of side effects.  Talk to your pediatrician regarding the use of this medicine in children. Special care may be needed.  Overdosage: If you think you have taken too much of this medicine contact a poison control center or emergency room at once.  NOTE: This medicine is only for you. Do not share this medicine with others.  What if I miss a dose?  If you miss a dose, use it as soon as you can. If it is almost time for your next dose, use only that dose. Do not use double or extra doses.  What may interact with this medicine?  Interactions are not expected. Do not use any other skin  products on the affected area without telling your doctor or health care professional.  This list may not describe all possible interactions. Give your health care provider a list of all the medicines, herbs, non-prescription drugs, or dietary supplements you use. Also tell them if you smoke, drink alcohol, or use illegal drugs. Some items may interact with your medicine.  What should I watch for while using this medicine?  Tell your doctor or health care professional if your symptoms do not improve after two weeks.  If used on blond, bleached, tinted, grey or permed hair, rinse for at least 5 minutes to minimize the chance for hair discoloration. This medicine should not be used within 48 hours of applying hair color or permanent wave solutions.  This medicine may damage jewelry. Remove any jewelry before use.  What side effects may I notice from receiving this medicine?  Side effects that you should report to your doctor or health care professional as soon as possible:  -lack of healing of the skin condition  -severe redness or irritation of the skin after using this medicine  Side effects that usually do not require medical attention (report to your doctor or health care professional if they continue or are bothersome):  -minor skin irritation  This list may not describe all possible side effects. Call your doctor for medical advice about side effects. You may report side effects to FDA at 1-182-FDA-7151.  Where should I keep my medicine?  Keep out of the reach of children.  Store at room temperature between 15 and 30 degrees C (59 and 86 degrees F). Keep tightly closed. Throw away any unused medicine after the expiration date.  NOTE: This sheet is a summary. It may not cover all possible information. If you have questions about this medicine, talk to your doctor, pharmacist, or health care provider.  © 2018 Elsevier/Gold Standard (2009-08-19 15:28:51)  Seborrheic Dermatitis, Adult  Seborrheic dermatitis is a  skin disease that causes red, scaly patches. It usually occurs on the scalp, and it is often called dandruff. The patches may appear on other parts of the body. Skin patches tend to appear where there are many oil glands in the skin. Areas of the body that are commonly affected include:  · Scalp.  · Skin folds of the body.  · Ears.  · Eyebrows.  · Neck.  · Face.  · Armpits.  · The bearded area of men's faces.  The condition may come and go for no known reason, and it is often long-lasting (chronic).  What are the causes?  The cause of this condition is not known.  What increases the risk?  This condition is more likely to develop in people who:  · Have certain conditions, such as:  ¨ HIV (human immunodeficiency virus).  ¨ AIDS (acquired immunodeficiency syndrome).  ¨ Parkinson disease.  ¨ Mood disorders, such as depression.  · Are 40-60 years old.  What are the signs or symptoms?  Symptoms of this condition include:  · Thick scales on the scalp.  · Redness on the face or in the armpits.  · Skin that is flaky. The flakes may be white or yellow.  · Skin that seems oily or dry but is not helped with moisturizers.  · Itching or burning in the affected areas.  How is this diagnosed?  This condition is diagnosed with a medical history and physical exam. A sample of your skin may be tested (skin biopsy). You may need to see a skin specialist (dermatologist).  How is this treated?  There is no cure for this condition, but treatment can help to manage the symptoms. You may get treatment to remove scales, lower the risk of skin infection, and reduce swelling or itching. Treatment may include:  · Creams that reduce swelling and irritation (steroids).  · Creams that reduce skin yeast.  · Medicated shampoo, soaps, moisturizing creams, or ointments.  · Medicated moisturizing creams or ointments.  Follow these instructions at home:  · Apply over-the-counter and prescription medicines only as told by your health care  provider.  · Use any medicated shampoo, soaps, skin creams, or ointments only as told by your health care provider.  · Keep all follow-up visits as told by your health care provider. This is important.  Contact a health care provider if:  · Your symptoms do not improve with treatment.  · Your symptoms get worse.  · You have new symptoms.  This information is not intended to replace advice given to you by your health care provider. Make sure you discuss any questions you have with your health care provider.  Document Released: 12/18/2006 Document Revised: 07/07/2017 Document Reviewed: 04/06/2017  [employer] Interactive Patient Education © 2017 Elsevier Inc.

## 2018-04-03 ENCOUNTER — HOSPITAL ENCOUNTER (OUTPATIENT)
Dept: LAB | Facility: MEDICAL CENTER | Age: 38
End: 2018-04-03
Attending: STUDENT IN AN ORGANIZED HEALTH CARE EDUCATION/TRAINING PROGRAM
Payer: COMMERCIAL

## 2018-04-03 DIAGNOSIS — L65.9 HAIR LOSS: ICD-10-CM

## 2018-04-03 DIAGNOSIS — B00.9 HSV INFECTION: ICD-10-CM

## 2018-04-03 LAB
ANION GAP SERPL CALC-SCNC: 6 MMOL/L (ref 0–11.9)
BUN SERPL-MCNC: 14 MG/DL (ref 8–22)
CALCIUM SERPL-MCNC: 9.4 MG/DL (ref 8.5–10.5)
CHLORIDE SERPL-SCNC: 105 MMOL/L (ref 96–112)
CO2 SERPL-SCNC: 28 MMOL/L (ref 20–33)
CREAT SERPL-MCNC: 0.63 MG/DL (ref 0.5–1.4)
GLUCOSE SERPL-MCNC: 64 MG/DL (ref 65–99)
POTASSIUM SERPL-SCNC: 3.7 MMOL/L (ref 3.6–5.5)
SODIUM SERPL-SCNC: 139 MMOL/L (ref 135–145)

## 2018-04-03 PROCEDURE — 80048 BASIC METABOLIC PNL TOTAL CA: CPT

## 2018-04-03 PROCEDURE — 36415 COLL VENOUS BLD VENIPUNCTURE: CPT

## 2018-04-10 ENCOUNTER — OFFICE VISIT (OUTPATIENT)
Dept: INTERNAL MEDICINE | Facility: MEDICAL CENTER | Age: 38
End: 2018-04-10
Payer: COMMERCIAL

## 2018-04-10 ENCOUNTER — HOSPITAL ENCOUNTER (OUTPATIENT)
Facility: MEDICAL CENTER | Age: 38
End: 2018-04-10
Attending: DERMATOLOGY
Payer: COMMERCIAL

## 2018-04-10 VITALS — BODY MASS INDEX: 19.18 KG/M2 | WEIGHT: 101.6 LBS | HEIGHT: 61 IN

## 2018-04-10 DIAGNOSIS — D48.5 NEOPLASM OF UNCERTAIN BEHAVIOR OF SKIN: ICD-10-CM

## 2018-04-10 DIAGNOSIS — L65.9 HYPOTRICHOSIS: ICD-10-CM

## 2018-04-10 DIAGNOSIS — L68.9 HYPERTRICHIASIS: ICD-10-CM

## 2018-04-10 DIAGNOSIS — L65.9 HAIR LOSS: ICD-10-CM

## 2018-04-10 PROCEDURE — 88305 TISSUE EXAM BY PATHOLOGIST: CPT

## 2018-04-10 PROCEDURE — 99213 OFFICE O/P EST LOW 20 MIN: CPT | Mod: 25 | Performed by: DERMATOLOGY

## 2018-04-10 PROCEDURE — 11100 PR BIOPSY OF SKIN LESION: CPT | Performed by: DERMATOLOGY

## 2018-04-10 PROCEDURE — 99000 SPECIMEN HANDLING OFFICE-LAB: CPT | Performed by: DERMATOLOGY

## 2018-04-10 NOTE — PROGRESS NOTES
"Felisha Pedraza  1980  [id number]    Consultation             Vitals:    04/10/18 1036   Weight: 46.1 kg (101 lb 9.6 oz)   Height: 1.549 m (5' 1\")       Chief Complaint   Patient presents with   • New Patient     Skin tag buttock & hair loss      ___________________________________________________________________            History of Present Illness (HPI)   C/o hair loss x 4 months after baby. Stopped falling out now. H/o post partum hair loss   With first baby about 2.5 years ago. Current Baby is seven months. Hair loss is improving.   Pt isnt sure if spironolactone helping, but thinks hair loss is improving. Back on birth  Control pills x 3 months.         Dr. Mendoza  has referred for a consultation to evaluate hair loss    Current Outpatient Prescriptions on File Prior to Visit   Medication Sig Dispense Refill   • spironolactone (ALDACTONE) 25 MG Tab Take 1 Tab by mouth every day. 30 Tab 3   • acyclovir (ZOVIRAX) 400 MG tablet Take 1 Tab by mouth 3 times a day. 30 Tab 0     No current facility-administered medications on file prior to visit.      Patient has no known allergies.      Review of Systems:        General: Denies fever      Hematologic: no excessive bleeding problems  C/o growth in crack of buttocks x years. Growing bigger. Tender. No prior rx.               Past Medical History:   Diagnosis Date   • Migraine    • Other specified symptom associated with female genital organs    • PCOS (polycystic ovarian syndrome)      Skin Cancer no h/o    Social History   Substance Use Topics   • Smoking status: Former Smoker     Packs/day: 0.10     Years: 10.00     Types: Cigarettes   • Smokeless tobacco: Never Used   • Alcohol use Yes      Comment: 2 per week     Sunscreen Use:Yes     Past Surgical History:   Procedure Laterality Date   • REPEAT C SECTION  9/2/2017    Procedure: REPEAT C SECTION;  Surgeon: Sara Guzman M.D.;  Location: LABOR AND DELIVERY;  Service: Labor and Delivery   • " PELVISCOPY  4/3/2013    Performed by Shin Mayo M.D. at SURGERY SAME DAY [facility]   • CYST EXCISION  4/3/2013    Performed by Shin Mayo M.D. at SURGERY SAME DAY [facility]   • OTHER ORTHOPEDIC SURGERY  2011    clavical right           Family History   Problem Relation Age of Onset   • Thyroid Mother      hyperthyroidsim    • Cancer Father    • Hypertension Father          Physical Exam:   Constitutional: Well nourished, NAD, pleasant  alert and cooperative; normal mood and affect; normal attention span and concentration.    Skin   B/l temporal sides of scalp with excellent hair growth  Pt has hair extensions  Moderate dandruff  Normal eyebrows and thin eyelashes  Chin with moderate terminal hairs  face exam done: no suspicious lesions on eyelids, lips, face, ears, except as noted.  R side buttocks 5 mm skin colored pap                  Assessment and Plan:  1. Hair loss  Pt had some post partum hair loss, but also PCOS. Seems better with OCP and sprinolactone.   Pt is taking hair and nail MVI. Can try topical minoxidil.     2. Hypotrichosis  For eyelashes, pt defer latisse. Can do extentions    3. Hypertrichiasis  For chin, excess hair, pt defers vaniqa, can try laser hair removal.      5. Neoplasm of uncertain behavior of skin  SHAVE BIOPSY R buttocks r/o dysplastic nevus    Informed consent. area was cleansed with Alcohol. The lesion area was infiltrated with    Lidocaine 1% . flexible blade to remove specimen. Hemostasis with    aluminum chloride. Bandaid applied. Wound care instructions were given orally.            Arely Nieves M.D.   Return if symptoms worsen or fail to improve.

## 2018-04-24 ENCOUNTER — TELEPHONE (OUTPATIENT)
Dept: INTERNAL MEDICINE | Facility: MEDICAL CENTER | Age: 38
End: 2018-04-24

## 2018-06-13 ENCOUNTER — APPOINTMENT (OUTPATIENT)
Dept: INTERNAL MEDICINE | Facility: MEDICAL CENTER | Age: 38
End: 2018-06-13

## 2018-06-13 ENCOUNTER — OFFICE VISIT (OUTPATIENT)
Dept: INTERNAL MEDICINE | Facility: MEDICAL CENTER | Age: 38
End: 2018-06-13
Payer: COMMERCIAL

## 2018-06-13 VITALS
HEART RATE: 64 BPM | BODY MASS INDEX: 19.11 KG/M2 | DIASTOLIC BLOOD PRESSURE: 70 MMHG | WEIGHT: 101.2 LBS | TEMPERATURE: 98.7 F | HEIGHT: 61 IN | SYSTOLIC BLOOD PRESSURE: 100 MMHG | OXYGEN SATURATION: 95 %

## 2018-06-13 DIAGNOSIS — R22.2 LUMP OF SKIN OF BACK: ICD-10-CM

## 2018-06-13 DIAGNOSIS — L65.9 HAIR LOSS: ICD-10-CM

## 2018-06-13 DIAGNOSIS — R22.0 LIP SWELLING: ICD-10-CM

## 2018-06-13 PROCEDURE — 99213 OFFICE O/P EST LOW 20 MIN: CPT | Mod: GE | Performed by: INTERNAL MEDICINE

## 2018-06-13 NOTE — PROGRESS NOTES
"    Established Patient    Felisha presents today with the following:    CC:   Chief Complaint   Patient presents with   • Nodule     red bump on left shoulder   • Edema     swelling on lips for about 2 years   • Follow-Up     spironolactone       HPI:  38 yo F with Hx of PCOS came into clinic for above cc.    1. Lip swelling  Started noticing around this time 2 years ago, intermittent, no specific triggers she noticed. No vesicles or open lesions.     2. Lump of skin of back  She noticed it a few months ago before she got skin tan. At that time, it was pinkish. No open lesions.    3. Hair loss  It is improving with OC pills and spironolactone. She's wondering if spironolactone dose will be increased. Right after starting medication, she was feeling lightheaded, but now feeling great. BMP in 2018 showed normal electrolytes. Glucose is low, but she is not symptomatic. Currently, no hypoglycemic symptoms.    ROS:   All other systems reviewed, negative except as stated above.    Patient Active Problem List    Diagnosis Date Noted   • Lip swelling 2018   • Lump of skin of back 2018   • Hypotrichosis 04/10/2018   • Hypertrichiasis 04/10/2018   • Neoplasm of uncertain behavior of skin 04/10/2018   • Hair loss 2018   • AMA (advanced maternal age) multigravida 35+ 2017   • Previous  section complicating pregnancy 2017   • HSV infection 2017   • PCOS (polycystic ovarian syndrome) 2012       Current Outpatient Prescriptions   Medication Sig Dispense Refill   • spironolactone (ALDACTONE) 25 MG Tab TAKE ONE TABLET BY MOUTH EVERY DAY 30 Tab 0   • norgestrel-ethinyl estradiol (LO-OVRAL) 0.3-30 MG-MCG Tab Take 1 Tab by mouth every day.       No current facility-administered medications for this visit.          /70   Pulse 64   Temp 37.1 °C (98.7 °F)   Ht 1.549 m (5' 1\")   Wt 45.9 kg (101 lb 3.2 oz)   SpO2 95%   BMI 19.12 kg/m²     Physical Exam  General: Alert and " oriented, No apparent distress.  Eyes: Pupils are equal and reactive. No scleral icterus.  Neck: Supple.   Lungs: Clear to auscultation bilaterally without any wheezing, crepitations.  Cardiovascular: Regular rate and rhythm. No murmurs, rubs or gallops.  Abdomen: Bowel sound +, soft, non tender, no rebound or guarding, no palpable organomegaly  Extremities: No clubbing, cyanosis, edema.  Skin:  tiny lipoma 0.5 cm lateral to scapula near posterior axilla. No visible skin lesion.  Neuro: A & O x 3. Normal speech and memory. Motor and sensory grossly normal.     Note: I have reviewed all pertinent labs and diagnostic tests associated with this visit with specific comments listed under the assessment and plan below    Assessment and Plan    1. Lip swelling  Unknown environmental allergies since it wax and wane. She's not on ACEI, no wheezing, tongue swelling or respiratory difficulties.  - recommended trial of claritin 10 mg daily. She will get OTC.  - REFERRAL TO ALLERGY per patient's request.    2. Lump of skin of back  - likely tiny lipoma 0.5 cm. No visible skin lesion.  - monitor.    3. Hair loss  - improving. Explained her that we will not increase spironolactone since her BP is low normal and hair loss is improving.     Return in about 3 months (around 9/13/2018). with Dr Mendoza    Signed by: Annette Munson M.D.

## 2018-07-18 DIAGNOSIS — E28.2 PCOS (POLYCYSTIC OVARIAN SYNDROME): ICD-10-CM

## 2018-07-18 DIAGNOSIS — L65.9 HAIR LOSS: ICD-10-CM

## 2018-07-19 ENCOUNTER — OFFICE VISIT (OUTPATIENT)
Dept: MEDICAL GROUP | Facility: MEDICAL CENTER | Age: 38
End: 2018-07-19
Payer: COMMERCIAL

## 2018-07-19 ENCOUNTER — HOSPITAL ENCOUNTER (OUTPATIENT)
Facility: MEDICAL CENTER | Age: 38
End: 2018-07-19
Attending: NURSE PRACTITIONER
Payer: COMMERCIAL

## 2018-07-19 VITALS
DIASTOLIC BLOOD PRESSURE: 68 MMHG | SYSTOLIC BLOOD PRESSURE: 102 MMHG | HEART RATE: 83 BPM | TEMPERATURE: 98.2 F | WEIGHT: 101.6 LBS | BODY MASS INDEX: 19.18 KG/M2 | RESPIRATION RATE: 16 BRPM | OXYGEN SATURATION: 98 % | HEIGHT: 61 IN

## 2018-07-19 DIAGNOSIS — E28.2 PCOS (POLYCYSTIC OVARIAN SYNDROME): ICD-10-CM

## 2018-07-19 DIAGNOSIS — L65.9 HAIR LOSS: ICD-10-CM

## 2018-07-19 DIAGNOSIS — R30.0 DYSURIA: ICD-10-CM

## 2018-07-19 DIAGNOSIS — F41.1 GAD (GENERALIZED ANXIETY DISORDER): ICD-10-CM

## 2018-07-19 PROBLEM — L68.9: Status: RESOLVED | Noted: 2018-04-10 | Resolved: 2018-07-19

## 2018-07-19 PROBLEM — D48.5 NEOPLASM OF UNCERTAIN BEHAVIOR OF SKIN: Status: RESOLVED | Noted: 2018-04-10 | Resolved: 2018-07-19

## 2018-07-19 PROBLEM — R22.2 LUMP OF SKIN OF BACK: Status: RESOLVED | Noted: 2018-06-13 | Resolved: 2018-07-19

## 2018-07-19 PROBLEM — R22.0 LIP SWELLING: Status: RESOLVED | Noted: 2018-06-13 | Resolved: 2018-07-19

## 2018-07-19 LAB
APPEARANCE UR: CLEAR
BILIRUB UR STRIP-MCNC: NORMAL MG/DL
COLOR UR AUTO: YELLOW
GLUCOSE UR STRIP.AUTO-MCNC: NORMAL MG/DL
KETONES UR STRIP.AUTO-MCNC: NORMAL MG/DL
LEUKOCYTE ESTERASE UR QL STRIP.AUTO: NORMAL
NITRITE UR QL STRIP.AUTO: NORMAL
PH UR STRIP.AUTO: 6 [PH] (ref 5–8)
PROT UR QL STRIP: NORMAL MG/DL
RBC UR QL AUTO: NORMAL
SP GR UR STRIP.AUTO: 1.02
UROBILINOGEN UR STRIP-MCNC: 0.2 MG/DL

## 2018-07-19 PROCEDURE — 99214 OFFICE O/P EST MOD 30 MIN: CPT | Performed by: NURSE PRACTITIONER

## 2018-07-19 PROCEDURE — 87077 CULTURE AEROBIC IDENTIFY: CPT

## 2018-07-19 PROCEDURE — 87086 URINE CULTURE/COLONY COUNT: CPT

## 2018-07-19 PROCEDURE — 81002 URINALYSIS NONAUTO W/O SCOPE: CPT | Performed by: NURSE PRACTITIONER

## 2018-07-19 PROCEDURE — 81001 URINALYSIS AUTO W/SCOPE: CPT

## 2018-07-19 RX ORDER — SPIRONOLACTONE 25 MG/1
25 TABLET ORAL
Qty: 90 TAB | Refills: 3 | Status: SHIPPED | OUTPATIENT
Start: 2018-07-19 | End: 2018-07-19 | Stop reason: SDUPTHER

## 2018-07-19 RX ORDER — ALPRAZOLAM 0.5 MG/1
0.5 TABLET ORAL NIGHTLY PRN
Qty: 7 TAB | Refills: 2 | Status: SHIPPED | OUTPATIENT
Start: 2018-07-19 | End: 2019-04-02 | Stop reason: SDUPTHER

## 2018-07-19 RX ORDER — SPIRONOLACTONE 25 MG/1
TABLET ORAL
Qty: 30 TAB | OUTPATIENT
Start: 2018-07-19

## 2018-07-19 RX ORDER — NITROFURANTOIN 25; 75 MG/1; MG/1
100 CAPSULE ORAL 2 TIMES DAILY
Qty: 14 CAP | Refills: 0 | Status: SHIPPED | OUTPATIENT
Start: 2018-07-19 | End: 2018-07-26

## 2018-07-19 RX ORDER — SPIRONOLACTONE 25 MG/1
25 TABLET ORAL
Qty: 90 TAB | Refills: 0 | OUTPATIENT
Start: 2018-07-19

## 2018-07-19 ASSESSMENT — ENCOUNTER SYMPTOMS: NERVOUS/ANXIOUS: 1

## 2018-07-19 NOTE — TELEPHONE ENCOUNTER
Last seen: 06/13/18 by Dr. Arpit Lira appt: 09/24/18 with Dr. Mendoza    Was the patient seen in the last year in this department? Yes   Does patient have an active prescription for medications requested? No   Received Request Via: Patient

## 2018-07-19 NOTE — PROGRESS NOTES
Subjective:      Felisha Pedraza is a 37 y.o. female who presents with UTI (frequency with urination, pain with urination) and Alopecia        CC: Patient is here today to establish care as well as for refills on medications, possibly UTI, and anxiety.    HPI Felisha Pedraza      1. Hair loss  Patient was referred to dermatology and laboratory workup was negative. She felt that the spironolactone that was prescribed was helpful and when she ran out of the medicine her hair started to come out again. She therefore would like to start back on this. Her potassium levels remained stable and she hasn't recently complained of dizziness on the medicine with blood pressure low normal.    2. PCOS (polycystic ovarian syndrome)  Patient on birth control pills after recently delivered a child. She was on metformin but it was stopped at pregnancy. She does not plan on having any more children.    3. Dysuria  Patient states for about 2 days she has been having urinary frequency and dysuria. She states symptoms are similar to when she has had a UTI in the past. She denies flank pain, fever or nausea. Nothing makes it better or worse.    4. KEVIN (generalized anxiety disorder)  Patient admits to problems with anxiety for at least a year or so. She was referred to counseling earlier this year by her previous PCPs office but she states she was never contacted. She would like to start with counseling. She also would like to get a refill on Xanax that had been prescribed to her in the past. She does not feel she needs a daily SSRI medication but only occasional medicine when she has a panic attack at night.  Social History   Substance Use Topics   • Smoking status: Former Smoker     Packs/day: 0.10     Years: 10.00     Types: Cigarettes   • Smokeless tobacco: Never Used   • Alcohol use Yes      Comment: 2 per week     Current Outpatient Prescriptions   Medication Sig Dispense Refill   • spironolactone (ALDACTONE) 25 MG Tab Take 1  "Tab by mouth every day. 90 Tab 3   • nitrofurantoin monohydr macro (MACROBID) 100 MG Cap Take 1 Cap by mouth 2 times a day for 7 days. 14 Cap 0   • ALPRAZolam (XANAX) 0.5 MG Tab Take 1 Tab by mouth at bedtime as needed for Sleep for up to 30 days. 7 Tab 2   • norgestrel-ethinyl estradiol (LO-OVRAL) 0.3-30 MG-MCG Tab Take 1 Tab by mouth every day.       No current facility-administered medications for this visit.      Family History   Problem Relation Age of Onset   • Thyroid Mother      hyperthyroidsim    • Cancer Father    • Hypertension Father      Past Medical History:   Diagnosis Date   • Migraine    • Other specified symptom associated with female genital organs    • PCOS (polycystic ovarian syndrome)        Review of Systems   Genitourinary: Positive for dysuria and frequency.   Psychiatric/Behavioral: The patient is nervous/anxious.    All other systems reviewed and are negative.         Objective:     /68   Pulse 83   Temp 36.8 °C (98.2 °F)   Resp 16   Ht 1.549 m (5' 1\")   Wt 46.1 kg (101 lb 9.6 oz)   SpO2 98%   BMI 19.20 kg/m²       Physical Exam   Constitutional: She is oriented to person, place, and time. She appears well-developed and well-nourished. No distress.   HENT:   Head: Normocephalic and atraumatic.   Right Ear: External ear normal.   Left Ear: External ear normal.   Nose: Nose normal.   Eyes: Right eye exhibits no discharge. Left eye exhibits no discharge.   Neck: Normal range of motion. Neck supple. No thyromegaly present.   Cardiovascular: Normal rate, regular rhythm and normal heart sounds.  Exam reveals no gallop and no friction rub.    No murmur heard.  Pulmonary/Chest: Effort normal and breath sounds normal. She has no wheezes. She has no rales.   Musculoskeletal: She exhibits no edema or tenderness.   Neurological: She is alert and oriented to person, place, and time. She displays normal reflexes.   Skin: Skin is warm and dry. No rash noted. She is not diaphoretic. "   Psychiatric: Her behavior is normal. Judgment and thought content normal. Her mood appears anxious.   Nursing note and vitals reviewed.              Assessment/Plan:     1. Hair loss  Patient feels that the spironolactone has been helpful and she would like to get back on this after running out a week ago. I have refilled the medicine today with the understanding she will do lab work again in a few weeks. Previous potassium levels after starting the medicine were normal. She was advised on the risk for hypotension.  - spironolactone (ALDACTONE) 25 MG Tab; Take 1 Tab by mouth every day.  Dispense: 90 Tab; Refill: 3    2. PCOS (polycystic ovarian syndrome)  Patient will continue on medications and does not feel she needs to start back on metformin.  - spironolactone (ALDACTONE) 25 MG Tab; Take 1 Tab by mouth every day.  Dispense: 90 Tab; Refill: 3  - COMP METABOLIC PANEL; Future    3. Dysuria  Urine dip in the office shows small leukocytes and trace blood so I will start her on Macrobid until we get a urine culture back.  Prevention of UTI's teaching was provided including:    Wipe front to back to prevent e.coli contamination. Increase fluid intake, especially water. Do not hold urine , void when feel need. Finally, be sure to void after intercourse.  - POCT Urinalysis  - URINALYSIS,CULTURE IF INDICATED; Future  - nitrofurantoin monohydr macro (MACROBID) 100 MG Cap; Take 1 Cap by mouth 2 times a day for 7 days.  Dispense: 14 Cap; Refill: 0    4. KEVIN (generalized anxiety disorder)  I discussed options with patient she does not want to go on a daily medication for just occasional anxiety. I am unable to check a PNP today but it appears she is not getting other medicines are controlled. I told her would write for no more than 7 pills per month and that if she wants refills after 3 months she would need to come in for a visit. Drug contract was signed today.  - ALPRAZolam (XANAX) 0.5 MG Tab; Take 1 Tab by mouth at  bedtime as needed for Sleep for up to 30 days.  Dispense: 7 Tab; Refill: 2  - Controlled Substance Treatment Agreement  - REFERRAL TO PSYCHOLOGY

## 2018-07-19 NOTE — TELEPHONE ENCOUNTER
From: Felisha Pedraza  Sent: 7/19/2018 10:34 AM PDT  Subject: Medication Renewal Request    Felisha Pedraza would like a refill of the following medications:     spironolactone (ALDACTONE) 25 MG Tab [Cleve Mendoza M.D.]    Preferred pharmacy: [id number] - [place] - [address] [facility]

## 2018-07-19 NOTE — TELEPHONE ENCOUNTER
Was the patient seen in the last year in this department? Yes    Last seen: 06/13/18 by Dr. Munson  Next appt: 09/24/18 with Dr. Mendoza      Does patient have an active prescription for medications requested? No     Received Request Via: Pharmacy

## 2018-07-20 DIAGNOSIS — R30.0 DYSURIA: ICD-10-CM

## 2018-07-20 LAB
APPEARANCE UR: ABNORMAL
BACTERIA #/AREA URNS HPF: ABNORMAL /HPF
COLOR UR: YELLOW
EPI CELLS #/AREA URNS HPF: ABNORMAL /HPF
GLUCOSE UR STRIP.AUTO-MCNC: NEGATIVE MG/DL
KETONES UR STRIP.AUTO-MCNC: NEGATIVE MG/DL
LEUKOCYTE ESTERASE UR QL STRIP.AUTO: NEGATIVE
MICRO URNS: ABNORMAL
MUCOUS THREADS #/AREA URNS HPF: ABNORMAL /HPF
NITRITE UR QL STRIP.AUTO: NEGATIVE
PH UR STRIP.AUTO: 6.5 [PH]
PROT UR QL STRIP: NEGATIVE MG/DL
RBC # URNS HPF: ABNORMAL /HPF
RBC UR QL AUTO: ABNORMAL
SP GR UR STRIP.AUTO: 1.03
WBC #/AREA URNS HPF: ABNORMAL /HPF

## 2018-07-20 RX ORDER — SPIRONOLACTONE 25 MG/1
25 TABLET ORAL
Qty: 90 TAB | Refills: 3 | Status: SHIPPED | OUTPATIENT
Start: 2018-07-20 | End: 2019-04-02

## 2018-07-22 LAB
BACTERIA UR CULT: ABNORMAL
BACTERIA UR CULT: ABNORMAL
SIGNIFICANT IND 70042: ABNORMAL
SITE SITE: ABNORMAL
SOURCE SOURCE: ABNORMAL

## 2018-09-24 ENCOUNTER — APPOINTMENT (OUTPATIENT)
Dept: INTERNAL MEDICINE | Facility: MEDICAL CENTER | Age: 38
End: 2018-09-24

## 2018-10-10 ENCOUNTER — TELEPHONE (OUTPATIENT)
Dept: OBGYN | Facility: CLINIC | Age: 38
End: 2018-10-10

## 2018-10-10 NOTE — TELEPHONE ENCOUNTER
Nic from [facility]'s pharmacy called triage line to get a refill for pt. I reviewed pt's chart and found no diagnostic reason to refill it. Also pt had not been seen in our office for over a year. I let Nic from the pharmacy know we could not refill it. He stated he would let the pt know, he had no further questions or concerns.

## 2018-10-31 ENCOUNTER — TELEPHONE (OUTPATIENT)
Dept: OBGYN | Facility: CLINIC | Age: 38
End: 2018-10-31

## 2018-10-31 NOTE — TELEPHONE ENCOUNTER
Pt called requesting another refill on her bc. I informed pt that I can give one more month supply but she needs to make her annual appointment. Pt agreed and was on hold to be transferred but hung up before then.

## 2018-11-01 NOTE — TELEPHONE ENCOUNTER
Pt called requesting another refill on her bc. I informed pt that I can give one more month supply but she needs to make her annual appointment. Pt agreed and was on hold to be transferred but hung up before then.  Pt called back and scheduled an appointment and the pharmacy was called to refill her bc for two months.

## 2018-12-10 ENCOUNTER — GYNECOLOGY VISIT (OUTPATIENT)
Dept: OBGYN | Facility: MEDICAL CENTER | Age: 38
End: 2018-12-10
Payer: COMMERCIAL

## 2018-12-10 VITALS
DIASTOLIC BLOOD PRESSURE: 70 MMHG | HEIGHT: 61 IN | SYSTOLIC BLOOD PRESSURE: 100 MMHG | BODY MASS INDEX: 19.81 KG/M2 | WEIGHT: 104.94 LBS

## 2018-12-10 DIAGNOSIS — Z30.41 ENCOUNTER FOR SURVEILLANCE OF CONTRACEPTIVE PILLS: ICD-10-CM

## 2018-12-10 DIAGNOSIS — L65.9 HAIR LOSS: ICD-10-CM

## 2018-12-10 DIAGNOSIS — G43.109 MIGRAINE WITH AURA AND WITHOUT STATUS MIGRAINOSUS, NOT INTRACTABLE: ICD-10-CM

## 2018-12-10 PROCEDURE — 99213 OFFICE O/P EST LOW 20 MIN: CPT | Performed by: OBSTETRICS & GYNECOLOGY

## 2018-12-10 RX ORDER — IBUPROFEN 800 MG/1
800 TABLET ORAL EVERY 8 HOURS PRN
Qty: 60 TAB | Refills: 1 | Status: SHIPPED | OUTPATIENT
Start: 2018-12-10 | End: 2019-04-02

## 2018-12-10 RX ORDER — ACETAMINOPHEN AND CODEINE PHOSPHATE 120; 12 MG/5ML; MG/5ML
1 SOLUTION ORAL DAILY
Qty: 28 TAB | Refills: 13 | Status: SHIPPED | OUTPATIENT
Start: 2018-12-10 | End: 2019-11-20 | Stop reason: SDUPTHER

## 2018-12-10 NOTE — NON-PROVIDER
Pt states that she just needs a refill on her BC [facility]-Ovral  [facility] #[phone]  Pharmacy verified.

## 2018-12-10 NOTE — PROGRESS NOTES
"S: Felisha presents for refill of birth control.  She is taking Micronor, due to hx of migraine with aura.  She likes the micronor because she does not have a period.  She had a recent migraine before getting a period when she ran out of her pills.  Also complains of hair loss, which she noticed after birth of her baby.  She was started on spironolactone, and her hair seems to be regrowing.      O:Blood pressure 100/70, height 1.549 m (5' 1\"), weight 47.6 kg (104 lb 15 oz), last menstrual period 12/05/2018, not currently breastfeeding.      Pap current    A/P:  1. Contraception - Micronor refilled.  Discussed importance of taking it consistently, same time every day to prevent pregnancy.  2. Migraine with aura - estrogen ocps contraindicated.  Recommend magnesium oxide supplementaion 500 mg daily for migraine prevention.  3. Hair loss - suspect related to pregnancy.  OK to discontinue spironolactone.     F/U 1 year or prn.   "

## 2019-04-02 ENCOUNTER — OFFICE VISIT (OUTPATIENT)
Dept: MEDICAL GROUP | Facility: MEDICAL CENTER | Age: 39
End: 2019-04-02
Payer: COMMERCIAL

## 2019-04-02 VITALS
BODY MASS INDEX: 20.39 KG/M2 | DIASTOLIC BLOOD PRESSURE: 68 MMHG | HEART RATE: 80 BPM | SYSTOLIC BLOOD PRESSURE: 102 MMHG | WEIGHT: 108 LBS | RESPIRATION RATE: 16 BRPM | HEIGHT: 61 IN | OXYGEN SATURATION: 97 %

## 2019-04-02 DIAGNOSIS — G43.109 MIGRAINE WITH AURA AND WITHOUT STATUS MIGRAINOSUS, NOT INTRACTABLE: ICD-10-CM

## 2019-04-02 DIAGNOSIS — F41.1 GAD (GENERALIZED ANXIETY DISORDER): ICD-10-CM

## 2019-04-02 DIAGNOSIS — Z00.00 ROUTINE GENERAL MEDICAL EXAMINATION AT A HEALTH CARE FACILITY: ICD-10-CM

## 2019-04-02 PROBLEM — L65.9 HAIR LOSS: Status: RESOLVED | Noted: 2018-03-26 | Resolved: 2019-04-02

## 2019-04-02 PROCEDURE — 99214 OFFICE O/P EST MOD 30 MIN: CPT | Performed by: NURSE PRACTITIONER

## 2019-04-02 RX ORDER — ALPRAZOLAM 0.5 MG/1
0.5 TABLET ORAL NIGHTLY PRN
Qty: 7 TAB | Refills: 2 | Status: SHIPPED | OUTPATIENT
Start: 2019-04-02 | End: 2020-03-17 | Stop reason: SDUPTHER

## 2019-04-02 RX ORDER — SUMATRIPTAN 100 MG/1
100 TABLET, FILM COATED ORAL
Qty: 10 TAB | Refills: 11 | Status: SHIPPED
Start: 2019-04-02 | End: 2020-07-06

## 2019-04-02 RX ORDER — AMITRIPTYLINE HYDROCHLORIDE 10 MG/1
10 TABLET, FILM COATED ORAL
Qty: 30 TAB | Refills: 11 | Status: SHIPPED
Start: 2019-04-02 | End: 2020-03-17

## 2019-04-02 RX ORDER — ONDANSETRON 4 MG/1
4 TABLET, ORALLY DISINTEGRATING ORAL EVERY 6 HOURS PRN
Qty: 10 TAB | Refills: 11 | Status: SHIPPED
Start: 2019-04-02 | End: 2020-07-06

## 2019-04-02 RX ORDER — SUMATRIPTAN 100 MG/1
100 TABLET, FILM COATED ORAL
COMMUNITY
End: 2019-04-02 | Stop reason: SDUPTHER

## 2019-04-02 ASSESSMENT — ENCOUNTER SYMPTOMS
HEADACHES: 1
NERVOUS/ANXIOUS: 1

## 2019-04-02 ASSESSMENT — PATIENT HEALTH QUESTIONNAIRE - PHQ9: CLINICAL INTERPRETATION OF PHQ2 SCORE: 0

## 2019-04-02 NOTE — PROGRESS NOTES
Subjective:      Felisha Pedraza is a 38 y.o. female who presents with Annual Exam and Medication Refill (migraine)        CC: Patient states she is here for annual checkup but she also is having issues with migraines and anxiety.    HPI Felisha Pedraza      1. Migraine with aura and without status migrainosus, not intractable  Patient states she has a history of migraines for which she would take occasional Imitrex 100 mg which helped.  She states lately she has been getting more migraine with nausea.  She also has aura.  It tends to happen most often when she is under stress or recently when she was skiing.  She would like to go on prophylaxis medication.  She has never tried anything before for this.    2. KEVIN (generalized anxiety disorder)  Patient would like to get a refill on Xanax which she uses infrequently for anxiety.  She has not wanted to be on daily medication such as an SSRI.  Her last prescription was filled for number 7 tablets in December.  She is not getting medicines from other offices.    3. Routine general medical examination at a health care facility  Patient would like to get some routine blood work done.  Social History   Substance Use Topics   • Smoking status: Former Smoker     Packs/day: 0.10     Years: 10.00     Types: Cigarettes   • Smokeless tobacco: Never Used   • Alcohol use Yes      Comment: 2 per week     Current Outpatient Prescriptions   Medication Sig Dispense Refill   • amitriptyline (ELAVIL) 10 MG Tab Take 1 Tab by mouth every bedtime. 30 Tab 11   • sumatriptan (IMITREX) 100 MG tablet Take 1 Tab by mouth Once PRN for Migraine. 10 Tab 11   • ondansetron (ZOFRAN ODT) 4 MG TABLET DISPERSIBLE Take 1 Tab by mouth every 6 hours as needed for Nausea. 10 Tab 11   • ALPRAZolam (XANAX) 0.5 MG Tab Take 1 Tab by mouth at bedtime as needed for Sleep for up to 30 days. 7 Tab 2   • norethindrone (MICRONOR) 0.35 MG tablet Take 1 Tab by mouth every day. 28 Tab 13     No current  "facility-administered medications for this visit.      Family History   Problem Relation Age of Onset   • Thyroid Mother         hyperthyroidsim    • Cancer Father    • Hypertension Father      Past Medical History:   Diagnosis Date   • Migraine    • Other specified symptom associated with female genital organs    • PCOS (polycystic ovarian syndrome)        Review of Systems   Neurological: Positive for headaches.   Psychiatric/Behavioral: The patient is nervous/anxious.    All other systems reviewed and are negative.         Objective:     /68 (BP Location: Right arm, Patient Position: Sitting, BP Cuff Size: Adult)   Pulse 80   Resp 16   Ht 1.549 m (5' 1\")   Wt 49 kg (108 lb)   SpO2 97%   BMI 20.41 kg/m²      Physical Exam   Constitutional: She is oriented to person, place, and time. She appears well-developed and well-nourished. No distress.   HENT:   Head: Normocephalic and atraumatic.   Right Ear: External ear normal.   Left Ear: External ear normal.   Nose: Nose normal.   Eyes: Right eye exhibits no discharge. Left eye exhibits no discharge.   Neck: Normal range of motion. Neck supple. No thyromegaly present.   Cardiovascular: Normal rate, regular rhythm and normal heart sounds.  Exam reveals no gallop and no friction rub.    No murmur heard.  Pulmonary/Chest: Effort normal and breath sounds normal. She has no wheezes. She has no rales.   Musculoskeletal: She exhibits no edema or tenderness.   Neurological: She is alert and oriented to person, place, and time. She displays normal reflexes.   Skin: Skin is warm and dry. No rash noted. She is not diaphoretic.   Psychiatric: Her behavior is normal. Judgment and thought content normal. Her mood appears anxious.   Nursing note and vitals reviewed.              Assessment/Plan:     1. Migraine with aura and without status migrainosus, not intractable  Patient is getting more frequent migraines so I agree at this point she might benefit from migraine " prophylactic medication.  She is on Micronor birth control but I do not feel comfortable prescribing category D drugs such as Topamax unless she is on IUD or other form of birth control.  I advised her that we would try amitriptyline low-dose 10 mg each night.  If this does not work I told her we could try venlafaxine which may also help with her anxiety.  I have refilled her Imitrex and provided her with Zofran for the nausea.  Side effects of Elavil reviewed.  - amitriptyline (ELAVIL) 10 MG Tab; Take 1 Tab by mouth every bedtime.  Dispense: 30 Tab; Refill: 11  - sumatriptan (IMITREX) 100 MG tablet; Take 1 Tab by mouth Once PRN for Migraine.  Dispense: 10 Tab; Refill: 11  - ondansetron (ZOFRAN ODT) 4 MG TABLET DISPERSIBLE; Take 1 Tab by mouth every 6 hours as needed for Nausea.  Dispense: 10 Tab; Refill: 11    2. KEVIN (generalized anxiety disorder)  Patient appears to be using this infrequently so I have refilled her medication but reminded her this is a category D drug pregnancy and what that entails.  She might benefit more from going on something like venlafaxine in the future.  - ALPRAZolam (XANAX) 0.5 MG Tab; Take 1 Tab by mouth at bedtime as needed for Sleep for up to 30 days.  Dispense: 7 Tab; Refill: 2    3. Routine general medical examination at a health care facility    - CBC WITHOUT DIFFERENTIAL; Future  - Comp Metabolic Panel; Future  - TSH; Future

## 2019-05-23 ENCOUNTER — APPOINTMENT (OUTPATIENT)
Dept: OBGYN | Facility: MEDICAL CENTER | Age: 39
End: 2019-05-23

## 2019-05-30 ENCOUNTER — OFFICE VISIT (OUTPATIENT)
Dept: URGENT CARE | Facility: CLINIC | Age: 39
End: 2019-05-30
Payer: COMMERCIAL

## 2019-05-30 VITALS
BODY MASS INDEX: 20.2 KG/M2 | HEIGHT: 61 IN | HEART RATE: 90 BPM | SYSTOLIC BLOOD PRESSURE: 112 MMHG | WEIGHT: 107 LBS | OXYGEN SATURATION: 100 % | DIASTOLIC BLOOD PRESSURE: 64 MMHG | TEMPERATURE: 98.7 F | RESPIRATION RATE: 14 BRPM

## 2019-05-30 DIAGNOSIS — G43.109 MIGRAINE WITH AURA AND WITHOUT STATUS MIGRAINOSUS, NOT INTRACTABLE: ICD-10-CM

## 2019-05-30 PROCEDURE — 99214 OFFICE O/P EST MOD 30 MIN: CPT | Performed by: NURSE PRACTITIONER

## 2019-05-30 RX ORDER — ONDANSETRON 2 MG/ML
4 INJECTION INTRAMUSCULAR; INTRAVENOUS ONCE
Status: COMPLETED | OUTPATIENT
Start: 2019-05-30 | End: 2019-05-30

## 2019-05-30 RX ORDER — KETOROLAC TROMETHAMINE 30 MG/ML
60 INJECTION, SOLUTION INTRAMUSCULAR; INTRAVENOUS ONCE
Status: COMPLETED | OUTPATIENT
Start: 2019-05-30 | End: 2019-05-30

## 2019-05-30 RX ORDER — DEXAMETHASONE SODIUM PHOSPHATE 10 MG/ML
10 INJECTION INTRAMUSCULAR; INTRAVENOUS ONCE
Status: DISCONTINUED | OUTPATIENT
Start: 2019-05-30 | End: 2019-05-30

## 2019-05-30 RX ADMIN — ONDANSETRON 4 MG: 2 INJECTION INTRAMUSCULAR; INTRAVENOUS at 15:06

## 2019-05-30 RX ADMIN — KETOROLAC TROMETHAMINE 60 MG: 30 INJECTION, SOLUTION INTRAMUSCULAR; INTRAVENOUS at 15:05

## 2019-05-30 ASSESSMENT — ENCOUNTER SYMPTOMS
EYE PAIN: 0
NAUSEA: 1
BLURRED VISION: 0
SHORTNESS OF BREATH: 0
PHOTOPHOBIA: 1
FOCAL WEAKNESS: 0
LOSS OF CONSCIOUSNESS: 0
NUMBNESS: 0
ABDOMINAL PAIN: 0
DIZZINESS: 0
EYE REDNESS: 0
FEVER: 0
SORE THROAT: 0
VOMITING: 0
MYALGIAS: 0
HEADACHES: 1
SPEECH CHANGE: 0
MIGRAINE HEADACHES: 1
FACIAL SWEATING: 0
SEIZURES: 0
SCALP TENDERNESS: 0
CHILLS: 0
VISUAL CHANGE: 0

## 2019-05-30 NOTE — PROGRESS NOTES
Subjective:   Felisha Pedraza is a 38 y.o. female who presents for Migraine (migraine started today at 12 )        Headache    This is a new problem. The current episode started today. The problem occurs constantly. The problem has been rapidly worsening. The pain is located in the bilateral region. The pain does not radiate. The pain quality is similar to prior headaches. The quality of the pain is described as aching. The pain is at a severity of 10/10. The pain is moderate. Associated symptoms include nausea, phonophobia and photophobia. Pertinent negatives include no abdominal pain, blurred vision, dizziness, ear pain, eye pain, eye redness, facial sweating, fever, muscle aches, numbness, scalp tenderness, seizures, sore throat, visual change or vomiting. Associated symptoms comments: Sensation of numbness that resolved in arms. The symptoms are aggravated by bright light. She has tried triptans (Advil, zofran. ) for the symptoms. The treatment provided no relief. Her past medical history is significant for migraine headaches.   Patient was seen and evaluated by her PCP last month which she was prescribed amitriptyline to start taking prophylactically daily however patient did not start medication as she was concerned of the side effects of the medication.  Patient stating that this is her first migraine since her last visit from her PCP.      Review of Systems   Constitutional: Negative for chills and fever.   HENT: Negative for ear pain and sore throat.    Eyes: Positive for photophobia. Negative for blurred vision, pain and redness.   Respiratory: Negative for shortness of breath.    Cardiovascular: Negative for chest pain.   Gastrointestinal: Positive for nausea. Negative for abdominal pain and vomiting.   Genitourinary: Negative for hematuria.   Musculoskeletal: Negative for myalgias.   Skin: Negative for rash.   Neurological: Positive for headaches. Negative for dizziness, speech change, focal  "weakness, seizures, loss of consciousness and numbness.     No Known Allergies   Objective:   /64 (BP Location: Left arm, Patient Position: Sitting, BP Cuff Size: Adult)   Pulse 90   Temp 37.1 °C (98.7 °F) (Temporal)   Resp 14   Ht 1.549 m (5' 0.98\")   Wt 48.5 kg (107 lb)   SpO2 100%   BMI 20.23 kg/m²   Physical Exam   Constitutional: She is oriented to person, place, and time. She appears well-developed and well-nourished. No distress.   HENT:   Head: Normocephalic and atraumatic.   Right Ear: Tympanic membrane normal.   Left Ear: Tympanic membrane normal.   Nose: Nose normal. Right sinus exhibits no maxillary sinus tenderness and no frontal sinus tenderness. Left sinus exhibits no maxillary sinus tenderness and no frontal sinus tenderness.   Mouth/Throat: Uvula is midline, oropharynx is clear and moist and mucous membranes are normal. No posterior oropharyngeal edema, posterior oropharyngeal erythema or tonsillar abscesses. No tonsillar exudate.   Eyes: Pupils are equal, round, and reactive to light. Conjunctivae and EOM are normal. Right eye exhibits no discharge. Left eye exhibits no discharge.   Neck: Full passive range of motion without pain. No Brudzinski's sign and no Kernig's sign noted.   Cardiovascular: Normal rate and regular rhythm.    No murmur heard.  Pulmonary/Chest: Effort normal and breath sounds normal. No respiratory distress.   Abdominal: Soft. She exhibits no distension. There is no tenderness.   Neurological: She is alert and oriented to person, place, and time. She has normal reflexes. She is not disoriented. No cranial nerve deficit or sensory deficit. GCS eye subscore is 4. GCS verbal subscore is 5. GCS motor subscore is 6.   Skin: Skin is warm, dry and intact.   Psychiatric: She has a normal mood and affect.         Assessment/Plan:     1. Migraine with aura and without status migrainosus, not intractable  ketorolac (TORADOL) injection 60 mg    ondansetron (ZOFRAN) " syringe/vial injection 4 mg    REFERRAL TO FOLLOW-UP WITH PRIMARY CARE    DISCONTINUED: dexamethasone (DECADRON) injection (check route below) 10 mg     Patient is a 38-year-old female presented with the stated above.  Patient with a significant history of migraine headaches presenting symptoms consistent with migraine headache.  Discussed differentials with patient.  Patient did take Imitrex will trial Toradol and Zofran injection in clinic.  Patient is driving will not give Benadryl.  Neuro exam unremarkable.  Encourage patient to trial amitriptyline which she was prescribed by PCP for her migraines.  Patient given precautionary s/sx that mandate immediate follow up and evaluation in the ED. Advised of risks of not doing so.    DDX, Supportive care, and indications for immediate follow-up discussed with patient.    Instructed to return to clinic or nearest emergency department if we are not available for any change in condition, further concerns, or worsening of symptoms.    The patient demonstrated a good understanding and agreed with the treatment plan.

## 2019-06-03 DIAGNOSIS — G43.109 MIGRAINE WITH AURA AND WITHOUT STATUS MIGRAINOSUS, NOT INTRACTABLE: ICD-10-CM

## 2019-06-07 ENCOUNTER — OFFICE VISIT (OUTPATIENT)
Dept: NEUROLOGY | Facility: MEDICAL CENTER | Age: 39
End: 2019-06-07
Payer: COMMERCIAL

## 2019-06-07 VITALS
DIASTOLIC BLOOD PRESSURE: 62 MMHG | OXYGEN SATURATION: 99 % | HEART RATE: 90 BPM | HEIGHT: 60 IN | WEIGHT: 105 LBS | BODY MASS INDEX: 20.62 KG/M2 | SYSTOLIC BLOOD PRESSURE: 98 MMHG | TEMPERATURE: 98.2 F | RESPIRATION RATE: 14 BRPM

## 2019-06-07 DIAGNOSIS — R51.9 INTRACTABLE EPISODIC HEADACHE, UNSPECIFIED HEADACHE TYPE: ICD-10-CM

## 2019-06-07 PROCEDURE — 99204 OFFICE O/P NEW MOD 45 MIN: CPT

## 2019-06-07 RX ORDER — BUTALBITAL, ACETAMINOPHEN, CAFFEINE AND CODEINE PHOSPHATE 50; 325; 40; 30 MG/1; MG/1; MG/1; MG/1
1-2 CAPSULE ORAL EVERY 4 HOURS PRN
Qty: 20 CAP | Refills: 0 | Status: SHIPPED | OUTPATIENT
Start: 2019-06-07 | End: 2019-07-07

## 2019-06-07 RX ORDER — IBUPROFEN 800 MG/1
800 TABLET ORAL EVERY 8 HOURS PRN
COMMUNITY
End: 2019-11-20 | Stop reason: SDUPTHER

## 2019-06-07 NOTE — PROGRESS NOTES
CC:   Migraine     HPI:  39 yo woman who presents today in initial neurologic consultation. The patient was referred by their primary care provider, Vish NORIEGA  Migraines with visual aura of kaleidoscope and zig-zag lines since age 16.Her migraines are usually R sided with photophobia phonophobia and nausea and vomiting.  Last 2 weeks she had a migraine with RUE numbness and speech arrest during the headache. She went to Urgent Care and got zofran.she slept and woke up next day   Next day she had another episode of few hours went to [facility]   Got decadron, reglan,toradol   She had neurologist before and was prescribing her Elavil but she never took the medication.  She is on birth  Control.  She takes imitrex, zofran and Ibuprofen.  Worse in past 5-6 months   Stress and exercise provoke her migraines.  Imitrex- headache stays a 10   Usually the headache lasts all day.      ROS:   Constitutional: No fevers or chills.  Eyes: No blurry vision or eye pain.  ENT: No dysphagia or hearing loss.  Respiratory: No cough or shortness of breath.  Cardiovascular: No chest pain or palpitations.  GI: No nausea, vomiting, or diarrhea.  : No urinary incontinence or dysuria.  Musculoskeletal: No joint swelling or arthralgias.  Skin: No skin rashes.  Neuro: No headaches, dizziness, or tremors.  Endocrine: No heat or cold intolerance. No polydipsia or polyuria.  Psych: No depression or anxiety.  Heme/Lymph: No easy bruising or swollen lymph nodes.      Past Medical History:   Past Medical History:   Diagnosis Date   • Migraine    • Other specified symptom associated with female genital organs    • PCOS (polycystic ovarian syndrome)        Past Surgical History:   Past Surgical History:   Procedure Laterality Date   • REPEAT C SECTION  9/2/2017    Procedure: REPEAT C SECTION;  Surgeon: Sara Guzman M.D.;  Location: LABOR AND DELIVERY;  Service: Labor and Delivery   • PELVISCOPY  4/3/2013    Performed by Shin POST  MAYA Mayo at SURGERY SAME DAY [facility]   • CYST EXCISION  4/3/2013    Performed by Shin Mayo M.D. at SURGERY SAME DAY [facility]S   • OTHER ORTHOPEDIC SURGERY  2011    clavical right       Social History:   Social History     Social History   • Marital status:      Spouse name: N/A   • Number of children: N/A   • Years of education: N/A     Social History Main Topics   • Smoking status: Former Smoker     Packs/day: 0.10     Years: 10.00     Types: Cigarettes   • Smokeless tobacco: Never Used   • Alcohol use Yes      Comment: 2 per week   • Drug use: No      Comment: Prior history greater than 5 years ago.   • Sexual activity: Yes     Partners: Male     Birth control/ protection: Pill     Other Topics Concern   • Not on file     Social History Narrative   • No narrative on file       Family History:   Family History   Problem Relation Age of Onset   • Thyroid Mother         hyperthyroidsim    • Cancer Father    • Hypertension Father        Allergies:   No Known Allergies    Physical Exam:   Encounter Vitals  Standard Vitals  Vitals  Blood Pressure: (!) 98/62  Temperature: 36.8 °C (98.2 °F)  Temp src: Temporal  Pulse: 90  Respiration: 14  Pulse Oximetry: 99 %  Height: 152.4 cm (5')  Weight: 47.6 kg (105 lb)  Encounter Vitals  Temperature: 36.8 °C (98.2 °F)  Temp src: Temporal  Blood Pressure: (!) 98/62  Pulse: 90  Respiration: 14  Pulse Oximetry: 99 %  Weight: 47.6 kg (105 lb)  Height: 152.4 cm (5')  BMI (Calculated): 20.51  Constitutional: Well-developed, well-nourished, good hygiene. Appears stated age.  Cardiovascular: RRR, with no murmurs, rubs or gallops. No carotid bruits.   Respiratory: Lungs CTA B/L, no W/R/R.   Abdomen: Soft Non-tender to Palpation. Non-distended.  Extremities: No peripheral edema, pedal pulses intact.   Skin: Warm, dry, intact. No rashes observed.  Eyes: Sclera anicteric   Funduscopic: Optic discs flat with no evidence of papilledema or pallor.   Neurologic:   Mental  Status: Awake, alert, oriented x 3.   Speech: Fluent with normal prosody.   Memory: Able to recall recent and remote events accurately.    Concentration: Attentive. Able to focus on history and follow multi-step commands.              Fund of Knowledge: Appropriate   Cranial Nerves:    CN II: PERRL. No afferent pupillary defect.    CN III, IV, VI: Good eye closure, EOMI.     CN V: Facial sensation intact and symmetric.     CN VII: No facial asymmetry.     CN VIII: Hearing intact.     CN IX and X: Palate elevates symmetrically. Normal gag reflex.    CN XI: Symmetric shoulder shrug.     CN XII: Tongue midline.    Sensory: Intact light touch, vibration and temperature.    Coordination: No evidence of past-pointing on finger to nose testing, no dysdiadochokinesia. Heel to shin intact.             DTR's: 2+ throughout without clonus.    Babinski: Toes downgoing bilaterally.   Romberg: Negative.   Movements: No tremors observed.   Musculoskeletal:    Strength: 5/5 in upper and lower extremities bilaterally.   Gait: Steady, narrow based.    Tone: Normal bulk and tone.   Joints: No swelling.     Labs:  Reviewed   Imaging:   No imaging   Assessment/Plan:  Migraine headaches   Will prescribe fioricet 1-2 tbs max 10 tabs per month only to be used as rescue therapy - discussed with patient   She failed triptanes   MRI brain w and wo contrast to r/o structural lesion  Coenzyme q10 300 mg daily  Mag oxide 400 mg daily   Riboflavin 400 mg daily   Will consider botox injections      Greater than 50% of this 45 minute face to face encounter was devoted to disease state counseling and coordination of care.  Please see above assessment and plan for discussion.

## 2019-06-17 ENCOUNTER — TELEPHONE (OUTPATIENT)
Dept: NEUROLOGY | Facility: MEDICAL CENTER | Age: 39
End: 2019-06-17

## 2019-06-17 NOTE — TELEPHONE ENCOUNTER
Called left message for patient Dr ADKINS is out of the office and I sent request to another Dr and will let her know when done.

## 2019-06-17 NOTE — TELEPHONE ENCOUNTER
Patient called having MRI on Tuesday and is asking for something to relax her as she is claustrophobic.     Please advise    Thank you.

## 2019-06-18 ENCOUNTER — HOSPITAL ENCOUNTER (OUTPATIENT)
Dept: RADIOLOGY | Facility: MEDICAL CENTER | Age: 39
End: 2019-06-18
Payer: COMMERCIAL

## 2019-06-18 DIAGNOSIS — R51.9 INTRACTABLE EPISODIC HEADACHE, UNSPECIFIED HEADACHE TYPE: ICD-10-CM

## 2019-06-18 DIAGNOSIS — F41.9 ANXIETY: ICD-10-CM

## 2019-06-18 PROCEDURE — 700117 HCHG RX CONTRAST REV CODE 255

## 2019-06-18 PROCEDURE — A9585 GADOBUTROL INJECTION: HCPCS

## 2019-06-18 PROCEDURE — 70553 MRI BRAIN STEM W/O & W/DYE: CPT

## 2019-06-18 RX ORDER — DIAZEPAM 5 MG/1
5 TABLET ORAL
Qty: 1 TAB | Refills: 0 | Status: SHIPPED | OUTPATIENT
Start: 2019-06-18 | End: 2019-07-18

## 2019-06-18 RX ORDER — GADOBUTROL 604.72 MG/ML
5 INJECTION INTRAVENOUS ONCE
Status: COMPLETED | OUTPATIENT
Start: 2019-06-18 | End: 2019-06-18

## 2019-06-18 RX ADMIN — GADOBUTROL 5 ML: 604.72 INJECTION INTRAVENOUS at 10:45

## 2019-06-25 ENCOUNTER — OFFICE VISIT (OUTPATIENT)
Dept: BEHAVIORAL HEALTH | Facility: CLINIC | Age: 39
End: 2019-06-25
Payer: COMMERCIAL

## 2019-06-25 DIAGNOSIS — F41.1 GAD (GENERALIZED ANXIETY DISORDER): ICD-10-CM

## 2019-06-25 DIAGNOSIS — F42.2 MIXED OBSESSIONAL THOUGHTS AND ACTS: ICD-10-CM

## 2019-06-25 PROCEDURE — 90791 PSYCH DIAGNOSTIC EVALUATION: CPT | Performed by: PSYCHOLOGIST

## 2019-06-25 NOTE — BH THERAPY
[facility]  INITIAL ASSESSMENT    Name: Felisha Pedraza  MRN: [id number]  : 1980  Age: 38 y.o.  Date of assessment: 2019  PCP: GUILLERMINA Najera  Persons in attendance: Patient  Total session time: 50 minutes      CHIEF COMPLAINT AND HISTORY OF PRESENTING PROBLEM:  (as stated by Patient):  Felisha Pedraza is a 38 y.o., White female referred for assessment by No ref. provider found.    Chief Complaint   Patient presents with   • Anxiety   • Panic Attack   The patient ID/Chief Complaint:  The patient is a 38 year old female, , .  The patient self-referred and voluntarily presented for an individual intake to address chronic history of anxiety symptoms including obsessions and compulsions related to cleaning order and harming others including her children.  Reviewed limits of confidentiality and [facility] policies; patient expressed understanding and agreed to voluntarily proceed with evaluation and treatment.     Review of Symptoms:  Depression and other mood disorders   Increase in sleep No    Decrease in sleep No    Interest (anhedonia) No     Guilt feeling Yes   Worthless No   Hopelessness No    Regret Not Reported/Not Observed     Energy deficit No     Concentration deficit No      Appetite deficit No   Psychomotor   Retardation No     Agitation No      Suicidality No   Distractibility No    Indiscretions No    Grandiosity No    Flight of ideas No    Activity level Increase No   Sleep deficit (decreased need for 3 days) No   Talkativeness No    Anxiety Symptoms   Panic Attacks Yes    Breathing Difficulties No   Shallow Breathing No   Chest Pain No   Chest Pressure/ Chest Tightness No   Heart Pounding/Heart Palpitations No   Hyperventilation No   Sweating No   Muscle Tension/ Sore Muscles No   Concentration Problems No   Depersonalization/ Derealization No   Difficulty Speaking No   Digestion Issues No   Dizziness No   Headaches No    Lightheadedness No   Fear No   Feeling Ill No   Worrying No   Nausea No   Feeling Overwhelmed No   Feeling Shaky No   Low Energy No   Shaking No     Restlessness No     Easily fatigued No       Social Anxiety No         Sleep Disturbance Denies    Difficulty falling asleep No    Difficulty staying asleep Yes    Restless Yes    Unsatisfying sleep Yes    Nightmares weekly    Sleepwalking No    Restless legs No   Sleep Apnea No   Substance abuse Denies   Obsessive Symptoms Yes   Compulsive Symptoms Yes   Eating Disorder No    Body Dysmorphic Symptoms No   Somatic Symptoms No   Illness Anxiety No   Neurocognitive Disorder  Disturbance in attention No   Disturbance developed Not Reported/ Observed   Additional Disturbance None   Psychotic disorders Not Reported/ Observed    Delusions No   Hallucination No   Disorganized Speech No   Grossly Disorganized Behavior No   Negative Symptoms No     Relevant History:    Social History:  The patient was raised by intact family and denies history of abuse and neglect. The patient has 1 brother. The patient is currently  and has 2 children (4 and 2 year old) and this first marriage. The patient denies a history of domestic violence.     The patient completed post graduate degree education while in elementary and secondary did not require special education without history of suspension or explosions. The patient is currently employed as and denies history of being fired from a job.      Past Psychiatric History:  Patient denies a history of psychiatric hospitalization, suicide attempts, self-injurious behavior.  The patient denies a history of psychotherapy or other psychotropic medications.    Current psychotropic medication Yes  Xanax as needed    Family Psychiatric History:    Mother none  Father none  Brother none     HISTORY:  Does patient report current or past enlistment? No      SPIRITUAL/CULTURAL/IDENTITY:    Does the patient identify any  spiritual/cultural/identity factors as relevant to the presenting issue? No    LEGAL HISTORY:  Has the patient ever been involved with juvenile, adult, or family legal systems? Yes   [If yes, trigger section below:]  Does patient report ever being a victim of a crime?  No  Does patient report involvement in any current legal issues?  No  Does patient report ever being arrested or committing a crime? No  Does patient report any current agency (parole/probation/CPS/) involvement? No    SUBSTANCE USE/ADDICTION HISTORY:  Alcohol reports current use  Cannabis denies  Nicotine denies  Illicit drugs denies      [] Patient denies use of any substance/addictive behaviors    MEDICAL HISTORY:    Past medical/surgical history:   Past Medical History:   Diagnosis Date   • Migraine    • Other specified symptom associated with female genital organs    • Panic disorder    • PCOS (polycystic ovarian syndrome)       Past Surgical History:   Procedure Laterality Date   • REPEAT C SECTION  9/2/2017    Procedure: REPEAT C SECTION;  Surgeon: Sara Guzman M.D.;  Location: LABOR AND DELIVERY;  Service: Labor and Delivery   • PELVISCOPY  4/3/2013    Performed by Shin Mayo M.D. at SURGERY SAME DAY [facility]   • CYST EXCISION  4/3/2013    Performed by Shin Mayo M.D. at SURGERY SAME DAY [facility]   • OTHER ORTHOPEDIC SURGERY  2011    clavical right        Medication Allergies:  Patient has no known allergies.            SAFETY ASSESSMENT - SELF  The patient denied any suicide-related ideation and/or behaviors and intent/plan, denied thoughts about death and dying both in session and during the period since last appointment, or past 2 weeks.    Current Suicide Risk: Low    Risk Level: Not Currently at Clinically Significant Risk  Hospitalization is not deemed necessary at this time as the patient does not present a clear or imminent danger to self or others. No indication for pursuing higher level of care.  Outpatient management is currently most appropriate and least restrictive level of care.    Crisis Safety Plan completed and copy given to patient: Yes    SAFETY ASSESSMENT - OTHERS  Does report past symptoms of aggressive behavior or risk to others? No  Does parent/significant othtient acknowledge current or past symptoms of aggressive behavior or risk to others? No  Does parent/significant other report patient has current or past symptoms of aggressive behavior or risk to others? No    Recent change in frequency/specificity/intensity of thoughts or threats to harm others? No  Current access to firearms/other identified means of harm? Yes  If yes, willing to restrict access to weapons/means of harm? Yes  Protective factors present: Well-developed sense of empathy    Current Homicide Risk:  Not applicable  Crisis Safety Plan completed and copy given to patient? No  Based on information provided during the current assessment, is a mandated “duty to warn” being exercised? No      MENTAL STATUS/OBSERVATIONS     Patient did not present in acute distress. Patient was appropriately groomed. Patient was alert and oriented x4. Eye contact was appropriate. No abnormalities in attention or concentration were noted. No abnormalities of movement present; psychomotor activity was normal. Speech was fluent and regular in rhythm, rate, volume, and tone. Thought processes Linear, Logical and Goal Directed. There was no evidence of thought disorder. No auditory or visual hallucinations. Long and short term memory appeared to be intact. Insight, judgment, and impulse control were deemed to be good.  Reported mood was “anxious.” Affect was full-ranging and appropriate to thought content and conversation.  Patient denied past and current suicidal and homicidal ideation in plan, intent, and preparatory behavior.      RESULTS OF SCREENING MEASURES:  [] Not applicable     Psychometric Test Results:       BHM-20  Global Mental  Health  Within Normal Limits  Well Being Scale  Within Normal Limits  Symptoms Scale  Within Normal Limits  Anxiety Subscale  Within Normal Limits  Depression Subscale  Within Normal Limits  Alcohol/Drug Subscale Within Normal Limits  Bipolar Subscale  Within Normal Limits  Eating Disorder Subscale Within Normal Limits  Harm to other Subscale Within Normal Limits  Suicide Monitoring Scale No Indication of Risk  Life Functioning Scale   Within Normal Limits        DIAGNOSTIC IMPRESSION(S):  1. Mixed obsessional thoughts and acts    2. KEVIN (generalized anxiety disorder)          PLAN:    IDENTIFIED NEEDS/PLAN:  [If any of these marked, trigger DISPOSITION list]  Mood/anxiety  Refer to [facility]: Outpatient Therapy      1) The patient will return to the clinic 2-3 weeks.  2) Crisis Response Plan:  Reviewed emergency resources with the patient and the patient expressed understanding including:  If feeling suicidal, patient will call or present to the [facility] during duty hours or present to closest ED ([facility] or [facility], call 911 or crisis hotline ([phone]) after duty hours.  3) Referrals/Consults:  N/A  4) Referral appointment(s) scheduled? No  5) Barriers to Learning:  No   6) Readiness to Learn:  Yes   7) Cultural Concerns:  No   8) Patient voiced understanding of, and agreement with, plan and goals as annotated above Yes .  9) Declare these services are medically necessary and appropriate to the patient’s diagnosis and needs  10) The point of contact at the [facility] regarding this evaluation is Dr. Mims, Psychologist.    Jonathan Mims III, Ed.D.    This note was created using voice recognition software (Dragon). The accuracy of the dictation is limited by the abilities of the software. I have reviewed the note prior to signing, however some errors in grammar and context are still possible. If you have any  questions related to this note please do not hesitate to contact our office.

## 2019-07-08 ENCOUNTER — OFFICE VISIT (OUTPATIENT)
Dept: BEHAVIORAL HEALTH | Facility: CLINIC | Age: 39
End: 2019-07-08
Payer: COMMERCIAL

## 2019-07-08 DIAGNOSIS — F41.1 GAD (GENERALIZED ANXIETY DISORDER): ICD-10-CM

## 2019-07-08 DIAGNOSIS — F42.2 MIXED OBSESSIONAL THOUGHTS AND ACTS: ICD-10-CM

## 2019-07-08 PROCEDURE — 90834 PSYTX W PT 45 MINUTES: CPT | Performed by: PSYCHOLOGIST

## 2019-07-08 NOTE — BH THERAPY
[facility]  Therapy Progress Note    Patient Name: Felisha Pedraza  Patient MRN: [id number]  Today's Date: 7/8/2019     Type of session:Individual psychotherapy  Length of session: 45 minutes  Persons in attendance:Patient    Subjective/New Info: The patient ID/Chief Complaint:  The patient is a 38 year old female, , .  The patient self-referred and voluntarily presented for an individual intake to address chronic history of anxiety symptoms including obsessions and compulsions related to cleaning order and harming others including her children.  Reviewed limits of confidentiality and [facility] policies; patient expressed understanding and agreed to voluntarily proceed with evaluation and treatment.     Interval History:   Session Focus: Completed with the patient the [place] obsessive-compulsive scale.  She reported a number of aggressive obsessions including fear about harming someone with her vehicle and killing her shoulder and in a car accident.  She also reported recently having images of stabbing herself or her  when touching kitchen knives.  She also is experiencing contamination obsessions related to sticky substances.  Her obsessions include concerns about disease and her compulsive behavior is primarily checking and reordering.  Introduced the patient to subjective units of discomfort scale and began the process of developing a hierarchy.  The patient's total score on the [place] obsessive-compulsive scale was 25    Therapeutic Intervention: Cognitive Behavioral Therapy-EX/RP      Planned Intervention: EX/RP      Objective/Observations:    Patient did not present in acute distress. Patient was appropriately groomed. Patient was alert and oriented x4. Eye contact was appropriate. No abnormalities in attention or concentration were noted. No abnormalities of movement present; psychomotor activity was normal. Speech was fluent and regular in  rhythm, rate, volume, and tone. Thought processes linear, logical and goal directed. There was no evidence of thought disorder. No auditory or visual hallucinations. Long and short term memory appeared to be intact. Insight, judgment, and impulse control were deemed to be good.  Reported mood was “at ease.” Affect was full-ranging and appropriate to thought content and conversation.  Patient denied past and current suicidal and homicidal ideation in plan, intent, and preparatory behavior.      Diagnoses:   1. Mixed obsessional thoughts and acts    2. KEVIN (generalized anxiety disorder)         The patient denied any suicide-related ideation and/or behaviors and intent/plan, denied thoughts about death and dying both in session and during the period since last appointment, or past 2 weeks.    Risk Level: Not Currently at Clinically Significant Risk  Hospitalization is not deemed necessary at this time as the patient does not present a clear or imminent danger to self or others. No indication for pursuing higher level of care. Outpatient management is currently most appropriate and least restrictive level of care.    Current risk:   SUICIDE: Low   Homicide: Not applicable   Self-harm: Not applicable   Relapse: Not applicable   Other:    Safety Plan reviewed? No   If evidence of imminent risk is present, intervention/plan:         Treatment Goal(s)/Objective(s) addressed:  Goal:  Reduction in the amount of time spent focused on obsessive thoughts and performing compulsive behaviors    Objectives:  • Patient will identify the relationship between obsessions and compulsions  • Patient will identify at least three behaviors with response prevention         Progress toward Treatment Goals: No change    Plan:    1) The patient will return to the clinic 2-3 weeks.  2) Crisis Response Plan:  Reviewed emergency resources with the patient and the patient expressed understanding including:  If feeling suicidal, patient will call  or present to the [facility] during duty hours or present to closest ED ([facility] or [facility], call 911 or crisis hotline ([phone]) after duty hours.  3) Referrals/Consults:  N/A  4) Barriers to Learning:  No  5) Readiness to Learn:  Yes  6) Cultural Concerns:  No  7) Patient voiced understanding of, and agreement with, plan and goals as annotated above.  8) Declare these services are medically necessary and appropriate to the patient’s diagnosis and needs  9) The point of contact at the [facility] regarding this evaluation is Dr. Mims, Psychologist.    Jonathan Mims III, Ed.D.  7/8/2019

## 2019-07-15 ENCOUNTER — GYNECOLOGY VISIT (OUTPATIENT)
Dept: OBGYN | Facility: MEDICAL CENTER | Age: 39
End: 2019-07-15
Payer: COMMERCIAL

## 2019-07-15 ENCOUNTER — HOSPITAL ENCOUNTER (OUTPATIENT)
Facility: MEDICAL CENTER | Age: 39
End: 2019-07-15
Attending: OBSTETRICS & GYNECOLOGY
Payer: COMMERCIAL

## 2019-07-15 VITALS
BODY MASS INDEX: 21.6 KG/M2 | SYSTOLIC BLOOD PRESSURE: 110 MMHG | DIASTOLIC BLOOD PRESSURE: 62 MMHG | WEIGHT: 110 LBS | HEIGHT: 60 IN

## 2019-07-15 DIAGNOSIS — B37.31 YEAST VAGINITIS: ICD-10-CM

## 2019-07-15 DIAGNOSIS — R30.0 DYSURIA: ICD-10-CM

## 2019-07-15 LAB
APPEARANCE UR: CLEAR
BILIRUB UR STRIP-MCNC: NORMAL MG/DL
COLOR UR AUTO: YELLOW
GLUCOSE UR STRIP.AUTO-MCNC: NORMAL MG/DL
KETONES UR STRIP.AUTO-MCNC: NORMAL MG/DL
LEUKOCYTE ESTERASE UR QL STRIP.AUTO: NORMAL
NITRITE UR QL STRIP.AUTO: NORMAL
PH UR STRIP.AUTO: 7.5 [PH] (ref 5–8)
PROT UR QL STRIP: NORMAL MG/DL
RBC UR QL AUTO: NORMAL
SP GR UR STRIP.AUTO: 1.02
UROBILINOGEN UR STRIP-MCNC: NORMAL MG/DL

## 2019-07-15 PROCEDURE — 99213 OFFICE O/P EST LOW 20 MIN: CPT | Performed by: OBSTETRICS & GYNECOLOGY

## 2019-07-15 PROCEDURE — 87480 CANDIDA DNA DIR PROBE: CPT

## 2019-07-15 PROCEDURE — 87660 TRICHOMONAS VAGIN DIR PROBE: CPT

## 2019-07-15 PROCEDURE — 87086 URINE CULTURE/COLONY COUNT: CPT

## 2019-07-15 PROCEDURE — 87510 GARDNER VAG DNA DIR PROBE: CPT

## 2019-07-15 PROCEDURE — 81002 URINALYSIS NONAUTO W/O SCOPE: CPT | Performed by: OBSTETRICS & GYNECOLOGY

## 2019-07-15 RX ORDER — FLUCONAZOLE 150 MG/1
150 TABLET ORAL DAILY
Qty: 1 TAB | Refills: 0 | Status: SHIPPED
Start: 2019-07-15 | End: 2020-07-06

## 2019-07-15 NOTE — PROGRESS NOTES
"S: Felisha presents with complaint of vaginal itching and irritation for 1 week.  She tried over-the-counter Monistat for 1 day, without improvement.  She has some white vaginal discharge.  This morning states she had \"bladder pain\" and took some leftover amoxicillin that her  had at the house.  She denies dysuria frequency or urgency.  Denies fevers or chills.  She is using Micronor for contraception.    O:/62 (BP Location: Left arm, Patient Position: Sitting)   Ht 1.524 m (5')   Wt 49.9 kg (110 lb)      GENERAL: Alert, in no apparent distress  PSYCHIATRIC: Appropriate affect, intact insight and judgement.  ABDOMEN: Soft, nontender, nondistended.  No palpable masses.  No rebound or guarding.  No inguinal lymphadenopathy.  No hepatosplenomegaly.  No hernias.  BACK: No CVA tenderness  EXTREMITIES: No edema  SKIN: No rash    GENITOURINARY:  Normal external genitalia, no lesions.  Normal urethral meatus, no masses or tenderness.  Normal bladder without fullness or masses.  Vagina well estrogenized, erythematous, thick white vaginal discharge present.   Cervix without lesions or discharge, nontender.  Uterus normal size, shape, and contour, nontender.  Adnexa nontender, no masses.  Normal anus and perineum.    Rectal Exam - not indicated.    POC- UA: Moderate blood, nitrate negative, moderate leukocyte esterase.    A/P:  1. Vaginal discharge - likely partially treated yeast infection.  Vaginal pathogens swab obtained.  Diflucan 150 mg po x 1  2. Possible UTI - will send for culture  3. Contraception -the patient has history of frequent migraine headaches.  Her primary doctor wanted to start her on Topamax, however did not feel comfortable with her current method of contraception.  She is considering a longer acting form of contraception.  We discussed Mirena IUD versus Nexplanon.  She will think about it and get back to us.    Will call with results.  F/U prn.  "

## 2019-07-16 DIAGNOSIS — B96.89 BACTERIAL VAGINOSIS: ICD-10-CM

## 2019-07-16 DIAGNOSIS — N76.0 BACTERIAL VAGINOSIS: ICD-10-CM

## 2019-07-16 LAB
CANDIDA DNA VAG QL PROBE+SIG AMP: NEGATIVE
G VAGINALIS DNA VAG QL PROBE+SIG AMP: POSITIVE
T VAGINALIS DNA VAG QL PROBE+SIG AMP: NEGATIVE

## 2019-07-16 RX ORDER — METRONIDAZOLE 500 MG/1
500 TABLET ORAL 2 TIMES DAILY
Qty: 14 TAB | Refills: 0 | Status: SHIPPED
Start: 2019-07-16 | End: 2020-03-17

## 2019-07-18 LAB
BACTERIA UR CULT: NORMAL
SIGNIFICANT IND 70042: NORMAL
SITE SITE: NORMAL
SOURCE SOURCE: NORMAL

## 2019-07-22 ENCOUNTER — OFFICE VISIT (OUTPATIENT)
Dept: BEHAVIORAL HEALTH | Facility: CLINIC | Age: 39
End: 2019-07-22
Payer: COMMERCIAL

## 2019-07-22 DIAGNOSIS — F42.2 MIXED OBSESSIONAL THOUGHTS AND ACTS: ICD-10-CM

## 2019-07-22 DIAGNOSIS — F41.1 GAD (GENERALIZED ANXIETY DISORDER): ICD-10-CM

## 2019-07-22 PROCEDURE — 90834 PSYTX W PT 45 MINUTES: CPT | Performed by: PSYCHOLOGIST

## 2019-07-23 ENCOUNTER — TELEPHONE (OUTPATIENT)
Dept: OBGYN | Facility: CLINIC | Age: 39
End: 2019-07-23

## 2019-07-23 NOTE — TELEPHONE ENCOUNTER
Pt called stating she finished Flagyl and now having itching, thinks she may have a yeast infection, [place]. Consulted with midwife Nadiya Jack and recommends patient to do Monistat 7 OTC and if sx do not go away, pt should be seen when she is back. Pt may also to Monistat 1 day and second dose 2 days later. Pt notified and voiced understanding and will call us if she needs appt when she gets back.Pt had no further questions

## 2019-07-23 NOTE — BH THERAPY
[facility] Therapy Progress Note    Patient Name: Felisha Pedraza  Patient MRN: [id number]  Today's Date: 7/22/2019     Type of session:Individual psychotherapy  Length of session: 50 minutes  Persons in attendance:Patient    Subjective/New Info: The patient ID/Chief Complaint:  The patient is a 38 year old female, , .  The patient self-referred and voluntarily presented for an individual intake to address chronic history of anxiety symptoms including obsessions and compulsions related to cleaning order and harming others including her children.  Reviewed limits of confidentiality and [facility] policies; patient expressed understanding and agreed to voluntarily proceed with evaluation and treatment.     Interval History:   Session Focus: The patient's estimated global assessment of functioning suggests a moderate degree of emotional distress and difficulty dealing with relationships, work and/or school life.  The patient was able to implement some exposure tasks specifically around smudges.  Also discussed ways of decreasing her involvement by setting a timer particularly related to cleaning up the kitchen after her  is already cleaned up the kitchen and also picking up toys.  The patient reported a decrease in subjective units of discomfort associated with not picking up toys or wiping away the smudges.    Therapeutic Intervention: Cognitive Behavioral Therapy-EX/RP      Planned Intervention: EX/RP      Objective/Observations:    Patient did not present in acute distress. Patient was appropriately groomed. Patient was alert and oriented x4. Eye contact was appropriate. No abnormalities in attention or concentration were noted. No abnormalities of movement present; psychomotor activity was normal. Speech was fluent and regular in rhythm, rate, volume, and tone. Thought processes linear, logical and goal directed. There was no evidence of thought disorder.  No auditory or visual hallucinations. Long and short term memory appeared to be intact. Insight, judgment, and impulse control were deemed to be good.  Reported mood was “at ease.” Affect was full-ranging and appropriate to thought content and conversation.  Patient denied past and current suicidal and homicidal ideation in plan, intent, and preparatory behavior.      Diagnoses:   1. KEVIN (generalized anxiety disorder)    2. Mixed obsessional thoughts and acts         The patient denied any suicide-related ideation and/or behaviors and intent/plan, denied thoughts about death and dying both in session and during the period since last appointment, or past 2 weeks.    Risk Level: Not Currently at Clinically Significant Risk  Hospitalization is not deemed necessary at this time as the patient does not present a clear or imminent danger to self or others. No indication for pursuing higher level of care. Outpatient management is currently most appropriate and least restrictive level of care.    Current risk:   SUICIDE: Low   Homicide: Not applicable   Self-harm: Not applicable   Relapse: Not applicable   Other:    Safety Plan reviewed? No   If evidence of imminent risk is present, intervention/plan:         Treatment Goal(s)/Objective(s) addressed:  Goal:  Reduction in the amount of time spent focused on obsessive thoughts and performing compulsive behaviors    Objectives:  • Patient will identify the relationship between obsessions and compulsions  • Patient will identify at least three behaviors with response prevention         Progress toward Treatment Goals: Mild improvement    Plan:    1) The patient will return to the clinic 2-3 weeks.  2) Crisis Response Plan:  Reviewed emergency resources with the patient and the patient expressed understanding including:  If feeling suicidal, patient will call or present to the [facility] during duty hours or present to closest ED ([facility] or  [facility], call 911 or crisis hotline ([phone]) after duty hours.  3) Referrals/Consults:  N/A  4) Barriers to Learning:  No  5) Readiness to Learn:  Yes  6) Cultural Concerns:  No  7) Patient voiced understanding of, and agreement with, plan and goals as annotated above.  8) Declare these services are medically necessary and appropriate to the patient’s diagnosis and needs  9) The point of contact at the [facility] regarding this evaluation is Dr. Mims, Psychologist.    Jonathan Mims III, Ed.D.  7/22/2019

## 2019-07-26 DIAGNOSIS — G43.709 CHRONIC MIGRAINE W/O AURA W/O STATUS MIGRAINOSUS, NOT INTRACTABLE: ICD-10-CM

## 2019-08-22 ENCOUNTER — APPOINTMENT (OUTPATIENT)
Dept: BEHAVIORAL HEALTH | Facility: CLINIC | Age: 39
End: 2019-08-22

## 2019-09-10 ENCOUNTER — APPOINTMENT (OUTPATIENT)
Dept: BEHAVIORAL HEALTH | Facility: CLINIC | Age: 39
End: 2019-09-10

## 2019-11-20 ENCOUNTER — HOSPITAL ENCOUNTER (OUTPATIENT)
Facility: MEDICAL CENTER | Age: 39
End: 2019-11-20
Attending: OBSTETRICS & GYNECOLOGY
Payer: COMMERCIAL

## 2019-11-20 ENCOUNTER — GYNECOLOGY VISIT (OUTPATIENT)
Dept: OBGYN | Facility: MEDICAL CENTER | Age: 39
End: 2019-11-20
Payer: COMMERCIAL

## 2019-11-20 VITALS — WEIGHT: 101 LBS | DIASTOLIC BLOOD PRESSURE: 64 MMHG | SYSTOLIC BLOOD PRESSURE: 100 MMHG | BODY MASS INDEX: 19.73 KG/M2

## 2019-11-20 DIAGNOSIS — Z11.51 SCREENING FOR HUMAN PAPILLOMAVIRUS (HPV): ICD-10-CM

## 2019-11-20 DIAGNOSIS — Z12.4 SCREENING FOR CERVICAL CANCER: ICD-10-CM

## 2019-11-20 DIAGNOSIS — Z01.419 WELL WOMAN EXAM WITH ROUTINE GYNECOLOGICAL EXAM: ICD-10-CM

## 2019-11-20 PROBLEM — O09.529 AMA (ADVANCED MATERNAL AGE) MULTIGRAVIDA 35+: Status: RESOLVED | Noted: 2017-04-25 | Resolved: 2019-11-20

## 2019-11-20 PROBLEM — O34.219 PREVIOUS CESAREAN SECTION COMPLICATING PREGNANCY: Status: RESOLVED | Noted: 2017-04-25 | Resolved: 2019-11-20

## 2019-11-20 PROCEDURE — 88175 CYTOPATH C/V AUTO FLUID REDO: CPT

## 2019-11-20 PROCEDURE — 87624 HPV HI-RISK TYP POOLED RSLT: CPT

## 2019-11-20 PROCEDURE — 99395 PREV VISIT EST AGE 18-39: CPT | Performed by: OBSTETRICS & GYNECOLOGY

## 2019-11-20 RX ORDER — ACYCLOVIR 400 MG/1
400 TABLET ORAL 3 TIMES DAILY
Qty: 30 TAB | Refills: 2 | Status: SHIPPED
Start: 2019-11-20 | End: 2020-07-06

## 2019-11-20 RX ORDER — ACETAMINOPHEN AND CODEINE PHOSPHATE 120; 12 MG/5ML; MG/5ML
1 SOLUTION ORAL DAILY
Qty: 28 TAB | Refills: 13 | Status: SHIPPED | OUTPATIENT
Start: 2019-11-20 | End: 2021-07-06

## 2019-11-20 RX ORDER — IBUPROFEN 800 MG/1
800 TABLET ORAL EVERY 8 HOURS PRN
Qty: 60 TAB | Refills: 0 | Status: SHIPPED
Start: 2019-11-20 | End: 2020-07-06

## 2019-11-21 DIAGNOSIS — Z11.51 SCREENING FOR HUMAN PAPILLOMAVIRUS (HPV): ICD-10-CM

## 2019-11-21 DIAGNOSIS — Z12.4 SCREENING FOR CERVICAL CANCER: ICD-10-CM

## 2019-11-21 LAB
CYTOLOGY REG CYTOL: NORMAL
HPV HR 12 DNA CVX QL NAA+PROBE: NEGATIVE
HPV16 DNA SPEC QL NAA+PROBE: NEGATIVE
HPV18 DNA SPEC QL NAA+PROBE: NEGATIVE
SPECIMEN SOURCE: NORMAL

## 2019-11-21 NOTE — PROGRESS NOTES
Subjective:      Felisha Pedraza is a 39 y.o. female who presents with Gynecologic Exam (Annual )        CC: annual exam    HPI: 39-year-old  2 para 2-0-0-2 using progestin only pills for contraception presents for annual exam.  Her migraines with aura have improved.  She requests a refill of acyclovir for rare genital HSV outbreak.  She denies history of abnormal Pap smears.  No family history of breast cancer.    Past Medical History:   Diagnosis Date   • Migraine    • Other specified symptom associated with female genital organs    • Panic disorder    • PCOS (polycystic ovarian syndrome)        Past Surgical History:   Procedure Laterality Date   • REPEAT C SECTION  2017    Procedure: REPEAT C SECTION;  Surgeon: Sara Guzman M.D.;  Location: LABOR AND DELIVERY;  Service: Labor and Delivery   • PELVISCOPY  4/3/2013    Performed by Shin Mayo M.D. at SURGERY SAME DAY [facility] ORS   • CYST EXCISION  4/3/2013    Performed by Shin Mayo M.D. at SURGERY SAME DAY [facility]S   • OTHER ORTHOPEDIC SURGERY  2011    clavical right         Current Outpatient Medications:   •  ibuprofen (MOTRIN) 800 MG Tab, Take 1 Tab by mouth every 8 hours as needed., Disp: 60 Tab, Rfl: 0  •  norethindrone (MICRONOR) 0.35 MG tablet, Take 1 Tab by mouth every day., Disp: 28 Tab, Rfl: 13  •  acyclovir (ZOVIRAX) 400 MG tablet, Take 1 Tab by mouth 3 times a day., Disp: 30 Tab, Rfl: 2  •  metroNIDAZOLE (FLAGYL) 500 MG Tab, Take 1 Tab by mouth 2 Times a Day. No alcohol while taking this medication, Disp: 14 Tab, Rfl: 0  •  fluconazole (DIFLUCAN) 150 MG tablet, Take 1 Tab by mouth every day., Disp: 1 Tab, Rfl: 0  •  Riboflavin 400 MG Cap, Take 1 Cap by mouth every day., Disp: 90 Cap, Rfl: 3  •  Coenzyme Q10 300 MG Cap, Take 1 Cap by mouth every day., Disp: 90 Cap, Rfl: 3  •  amitriptyline (ELAVIL) 10 MG Tab, Take 1 Tab by mouth every bedtime. (Patient not taking: Reported on 2019), Disp: 30 Tab, Rfl: 11  •  sumatriptan  (IMITREX) 100 MG tablet, Take 1 Tab by mouth Once PRN for Migraine., Disp: 10 Tab, Rfl: 11  •  ondansetron (ZOFRAN ODT) 4 MG TABLET DISPERSIBLE, Take 1 Tab by mouth every 6 hours as needed for Nausea., Disp: 10 Tab, Rfl: 11    Patient has no known allergies.    Family History   Problem Relation Age of Onset   • Thyroid Mother         hyperthyroidsim    • Cancer Father    • Hypertension Father        Social History     Socioeconomic History   • Marital status:      Spouse name: Not on file   • Number of children: Not on file   • Years of education: Not on file   • Highest education level: Not on file   Occupational History   • Not on file   Social Needs   • Financial resource strain: Not on file   • Food insecurity:     Worry: Not on file     Inability: Not on file   • Transportation needs:     Medical: Not on file     Non-medical: Not on file   Tobacco Use   • Smoking status: Former Smoker     Packs/day: 0.10     Years: 10.00     Pack years: 1.00     Types: Cigarettes   • Smokeless tobacco: Never Used   Substance and Sexual Activity   • Alcohol use: Yes     Alcohol/week: 6.0 oz     Types: 10 Glasses of wine per week     Comment: 2 per week   • Drug use: No     Comment: Prior history greater than 5 years ago.   • Sexual activity: Yes     Partners: Male     Birth control/protection: Pill   Lifestyle   • Physical activity:     Days per week: Not on file     Minutes per session: Not on file   • Stress: Not on file   Relationships   • Social connections:     Talks on phone: Not on file     Gets together: Not on file     Attends Pentecostal service: Not on file     Active member of club or organization: Not on file     Attends meetings of clubs or organizations: Not on file     Relationship status: Not on file   • Intimate partner violence:     Fear of current or ex partner: Not on file     Emotionally abused: Not on file     Physically abused: Not on file     Forced sexual activity: Not on file   Other Topics  Concern   • Not on file   Social History Narrative   • Not on file       OB History    Para Term  AB Living   2 2 2 0 0 2   SAB TAB Ectopic Molar Multiple Live Births   0 0 0 0 0 2       ROS REVIEW OF SYSTEMS:    Pertinent positives and negatives mentioned in HPI.    All other systems reviewed and are negative or noncontributory.       Objective:     /64 (BP Location: Left arm, Patient Position: Sitting)   Wt 45.8 kg (101 lb)   BMI 19.73 kg/m²      Physical Exam      GENERAL: Alert, in no apparent distress  PSYCHIATRIC: Appropriate affect, intact insight and judgement.  NECK:  Nontender, no masses.  No Thyromegaly or nodules. No lymphadenopathy.  RESPIRATORY: Normal respiratory effort.  Lungs clear to auscultation.   CARDIOVASCULAR: RRR, no murmur, gallop, or rub.  ABDOMEN: Soft, nontender, nondistended.  No palpable masses.  No rebound or guarding.  No inguinal lymphadenopathy.  No hepatosplenomegaly.  No hernias.  BACK: No CVA tenderness  EXTREMITIES: No edema  SKIN: No rash    BREAST: No masses or tenderness.  No skin changes.  No nipple inversion or discharge. No axillary lymphadenopathy.      GENITOURINARY:  Normal external genitalia, no lesions.  Normal urethral meatus, no masses or tenderness.  Normal bladder without fullness or masses.  Vagina well estrogenized, no vaginal discharge or lesions.  Cervix without lesions or discharge, nontender.  Uterus normal size, shape, and contour, nontender.  Adnexa nontender, no masses.  Normal anus and perineum.    Rectal Exam - not indicated.       Assessment/Plan:       1. Well woman exam with routine gynecological exam  Reviewed monthly self breast exams and need for yearly screening mammograms starting at age 40.  Micronor, Acyclovir, and Motrin refilled.    2. Screening for cervical cancer  - THINPREP PAP WITH HPV; Future    3. Screening for human papillomavirus (HPV)  - THINPREP PAP WITH HPV; Future    F/U prn

## 2020-03-17 ENCOUNTER — OFFICE VISIT (OUTPATIENT)
Dept: MEDICAL GROUP | Facility: MEDICAL CENTER | Age: 40
End: 2020-03-17
Payer: COMMERCIAL

## 2020-03-17 VITALS
HEART RATE: 76 BPM | HEIGHT: 61 IN | BODY MASS INDEX: 19.45 KG/M2 | DIASTOLIC BLOOD PRESSURE: 70 MMHG | SYSTOLIC BLOOD PRESSURE: 118 MMHG | WEIGHT: 103 LBS | OXYGEN SATURATION: 97 % | RESPIRATION RATE: 16 BRPM

## 2020-03-17 DIAGNOSIS — Z00.00 ROUTINE GENERAL MEDICAL EXAMINATION AT A HEALTH CARE FACILITY: ICD-10-CM

## 2020-03-17 DIAGNOSIS — G43.109 MIGRAINE WITH AURA AND WITHOUT STATUS MIGRAINOSUS, NOT INTRACTABLE: ICD-10-CM

## 2020-03-17 DIAGNOSIS — F41.1 GAD (GENERALIZED ANXIETY DISORDER): ICD-10-CM

## 2020-03-17 PROCEDURE — 99213 OFFICE O/P EST LOW 20 MIN: CPT | Performed by: NURSE PRACTITIONER

## 2020-03-17 RX ORDER — ALPRAZOLAM 0.5 MG/1
0.5 TABLET ORAL NIGHTLY PRN
Qty: 30 TAB | Refills: 2 | Status: SHIPPED | OUTPATIENT
Start: 2020-03-17 | End: 2020-04-16

## 2020-03-17 ASSESSMENT — ENCOUNTER SYMPTOMS
HEADACHES: 1
NERVOUS/ANXIOUS: 1

## 2020-03-17 ASSESSMENT — PATIENT HEALTH QUESTIONNAIRE - PHQ9: CLINICAL INTERPRETATION OF PHQ2 SCORE: 0

## 2020-03-17 NOTE — PROGRESS NOTES
Subjective:      Felisha Pedraza is a 39 y.o. female who presents with Medication Refill (xANAX )        CC: Patient here today mainly requesting refills on anxiety medication.    HPI.      1. KEVIN (generalized anxiety disorder)  I last saw patient for this issue almost a year ago and she uses occasional alprazolam for breakthrough anxiety.  She was going to counseling last year but did not feel she needed to continue it.  She has not filled the prescription for Xanax in about 6 months.  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  She again declines going on daily SSRI medication    2. Migraine with aura and without status migrainosus, not intractable  Patient now following with neurology and she finds that the combination of supplements helps with symptoms and she only occasionally needs to use Fioricet.  These have been prescribed from the neurology office.    3. Routine general medical examination at a health care facility  Patient did not do lab work ordered last visit but states she would be willing to do some routine screening blood work this week.  Past Medical History:   Diagnosis Date   • Migraine    • Other specified symptom associated with female genital organs    • Panic disorder    • PCOS (polycystic ovarian syndrome)      Social History     Socioeconomic History   • Marital status:      Spouse name: Not on file   • Number of children: Not on file   • Years of education: Not on file   • Highest education level: Not on file   Occupational History   • Not on file   Social Needs   • Financial resource strain: Not on file   • Food insecurity     Worry: Not on file     Inability: Not on file   • Transportation needs     Medical: Not on file     Non-medical: Not on file   Tobacco Use   • Smoking status: Former Smoker     Packs/day: 0.10     Years: 10.00     Pack years: 1.00     Types: Cigarettes   • Smokeless tobacco: Never Used   Substance and Sexual Activity   •  Alcohol use: Yes     Alcohol/week: 6.0 oz     Types: 10 Glasses of wine per week     Comment: 2 per week   • Drug use: No     Comment: Prior history greater than 5 years ago.   • Sexual activity: Yes     Partners: Male     Birth control/protection: Pill   Lifestyle   • Physical activity     Days per week: Not on file     Minutes per session: Not on file   • Stress: Not on file   Relationships   • Social connections     Talks on phone: Not on file     Gets together: Not on file     Attends Taoist service: Not on file     Active member of club or organization: Not on file     Attends meetings of clubs or organizations: Not on file     Relationship status: Not on file   • Intimate partner violence     Fear of current or ex partner: Not on file     Emotionally abused: Not on file     Physically abused: Not on file     Forced sexual activity: Not on file   Other Topics Concern   • Not on file   Social History Narrative   • Not on file     Current Outpatient Medications   Medication Sig Dispense Refill   • ALPRAZolam (XANAX) 0.5 MG Tab Take 1 Tab by mouth at bedtime as needed for Sleep for up to 30 days. 30 Tab 2   • ibuprofen (MOTRIN) 800 MG Tab Take 1 Tab by mouth every 8 hours as needed. 60 Tab 0   • norethindrone (MICRONOR) 0.35 MG tablet Take 1 Tab by mouth every day. 28 Tab 13   • acyclovir (ZOVIRAX) 400 MG tablet Take 1 Tab by mouth 3 times a day. 30 Tab 2   • fluconazole (DIFLUCAN) 150 MG tablet Take 1 Tab by mouth every day. 1 Tab 0   • Riboflavin 400 MG Cap Take 1 Cap by mouth every day. 90 Cap 3   • Coenzyme Q10 300 MG Cap Take 1 Cap by mouth every day. 90 Cap 3   • sumatriptan (IMITREX) 100 MG tablet Take 1 Tab by mouth Once PRN for Migraine. 10 Tab 11   • ondansetron (ZOFRAN ODT) 4 MG TABLET DISPERSIBLE Take 1 Tab by mouth every 6 hours as needed for Nausea. 10 Tab 11     No current facility-administered medications for this visit.      Family History   Problem Relation Age of Onset   • Thyroid Mother       "   hyperthyroidsim    • Cancer Father    • Hypertension Father          Review of Systems   Neurological: Positive for headaches.   Psychiatric/Behavioral: The patient is nervous/anxious.    All other systems reviewed and are negative.         Objective:     /70 (BP Location: Right arm, Patient Position: Sitting, BP Cuff Size: Adult)   Pulse 76   Resp 16   Ht 1.549 m (5' 1\")   Wt 46.7 kg (103 lb)   SpO2 97%   BMI 19.46 kg/m²      Physical Exam  Vitals signs and nursing note reviewed.   Constitutional:       General: She is not in acute distress.     Appearance: She is well-developed. She is not diaphoretic.   HENT:      Head: Normocephalic and atraumatic.      Right Ear: External ear normal.      Left Ear: External ear normal.      Nose: Nose normal.   Eyes:      General:         Right eye: No discharge.         Left eye: No discharge.   Neck:      Musculoskeletal: Normal range of motion and neck supple.      Thyroid: No thyromegaly.   Cardiovascular:      Rate and Rhythm: Normal rate and regular rhythm.      Heart sounds: Normal heart sounds. No murmur. No friction rub. No gallop.    Pulmonary:      Effort: Pulmonary effort is normal.      Breath sounds: Normal breath sounds. No wheezing or rales.   Musculoskeletal:         General: No tenderness.   Skin:     General: Skin is warm and dry.      Findings: No rash.   Neurological:      Mental Status: She is alert and oriented to person, place, and time.      Deep Tendon Reflexes: Reflexes normal.   Psychiatric:         Behavior: Behavior normal.         Thought Content: Thought content normal.         Judgment: Judgment normal.                 Assessment/Plan:       1. KEVIN (generalized anxiety disorder)  Patient has previously signed drug contract and ORT shows low risk for addiction.   shows she has not filled a prescription for benzodiazepines for 6 months.  I have given her medication with refills and expect this should last her for many months.  " She again declines SSRI medication and does not feel she needs further counseling and does not think she has depression.  - ALPRAZolam (XANAX) 0.5 MG Tab; Take 1 Tab by mouth at bedtime as needed for Sleep for up to 30 days.  Dispense: 30 Tab; Refill: 2    2. Routine general medical examination at a health care facility    - Comp Metabolic Panel; Future  - TSH; Future  - CBC WITHOUT DIFFERENTIAL; Future    3. Migraine with aura and without status migrainosus, not intractable  Patient will continue to follow with neurology which is treating this issue.

## 2020-03-18 ENCOUNTER — TELEPHONE (OUTPATIENT)
Dept: NEUROLOGY | Facility: MEDICAL CENTER | Age: 40
End: 2020-03-18

## 2020-03-18 ENCOUNTER — OFFICE VISIT (OUTPATIENT)
Dept: NEUROLOGY | Facility: MEDICAL CENTER | Age: 40
End: 2020-03-18
Payer: COMMERCIAL

## 2020-03-18 VITALS
DIASTOLIC BLOOD PRESSURE: 68 MMHG | BODY MASS INDEX: 19.45 KG/M2 | HEIGHT: 61 IN | HEART RATE: 94 BPM | SYSTOLIC BLOOD PRESSURE: 108 MMHG | WEIGHT: 103 LBS | OXYGEN SATURATION: 100 % | RESPIRATION RATE: 15 BRPM | TEMPERATURE: 98.6 F

## 2020-03-18 DIAGNOSIS — G43.709 CHRONIC MIGRAINE W/O AURA W/O STATUS MIGRAINOSUS, NOT INTRACTABLE: ICD-10-CM

## 2020-03-18 PROCEDURE — 99213 OFFICE O/P EST LOW 20 MIN: CPT

## 2020-03-18 RX ORDER — BUTALBITAL, ACETAMINOPHEN AND CAFFEINE 300; 40; 50 MG/1; MG/1; MG/1
1 CAPSULE ORAL
Qty: 12 CAP | Refills: 2 | Status: SHIPPED | OUTPATIENT
Start: 2020-03-18 | End: 2020-07-06

## 2020-03-18 NOTE — PROGRESS NOTES
Cc migraines   Follow up  HPI  40 yo female with migraine headaches presents for a follow up.  Takes mag coenzyme q 10 and B2   fioricet helps   1-2 migraine since the last time she saw me.  Doing well.    Review of Systems   Constitutional: Negative.    HENT: Negative.    Eyes: Negative for photophobia.   Respiratory: Negative.    Cardiovascular: Negative.    Gastrointestinal: Negative.  Negative for nausea.   Genitourinary: Negative.    Musculoskeletal: Negative.    Skin: Negative.    Neurological: Positive for headaches.   Endo/Heme/Allergies: Negative.    Psychiatric/Behavioral: Negative.        Past Medical History:   Diagnosis Date   • Migraine    • Other specified symptom associated with female genital organs    • Panic disorder    • PCOS (polycystic ovarian syndrome)      Past Surgical History:   Procedure Laterality Date   • REPEAT C SECTION  9/2/2017    Procedure: REPEAT C SECTION;  Surgeon: Sara Guzman M.D.;  Location: LABOR AND DELIVERY;  Service: Labor and Delivery   • PELVISCOPY  4/3/2013    Performed by Shni Mayo M.D. at SURGERY SAME DAY [facility]S   • CYST EXCISION  4/3/2013    Performed by Shin Mayo M.D. at SURGERY SAME DAY [facility]   • OTHER ORTHOPEDIC SURGERY  2011    clavical right     Family History   Problem Relation Age of Onset   • Thyroid Mother         hyperthyroidsim    • Cancer Father    • Hypertension Father      Social History     Socioeconomic History   • Marital status:      Spouse name: Not on file   • Number of children: Not on file   • Years of education: Not on file   • Highest education level: Not on file   Occupational History   • Not on file   Social Needs   • Financial resource strain: Not on file   • Food insecurity     Worry: Not on file     Inability: Not on file   • Transportation needs     Medical: Not on file     Non-medical: Not on file   Tobacco Use   • Smoking status: Former Smoker     Packs/day: 0.10     Years: 10.00     Pack years: 1.00      Types: Cigarettes   • Smokeless tobacco: Never Used   Substance and Sexual Activity   • Alcohol use: Yes     Alcohol/week: 6.0 oz     Types: 10 Glasses of wine per week     Comment: 2 per week   • Drug use: No     Comment: Prior history greater than 5 years ago.   • Sexual activity: Yes     Partners: Male     Birth control/protection: Pill   Lifestyle   • Physical activity     Days per week: Not on file     Minutes per session: Not on file   • Stress: Not on file   Relationships   • Social connections     Talks on phone: Not on file     Gets together: Not on file     Attends Christianity service: Not on file     Active member of club or organization: Not on file     Attends meetings of clubs or organizations: Not on file     Relationship status: Not on file   • Intimate partner violence     Fear of current or ex partner: Not on file     Emotionally abused: Not on file     Physically abused: Not on file     Forced sexual activity: Not on file   Other Topics Concern   • Not on file   Social History Narrative   • Not on file     Current Outpatient Medications on File Prior to Visit   Medication Sig Dispense Refill   • ALPRAZolam (XANAX) 0.5 MG Tab Take 1 Tab by mouth at bedtime as needed for Sleep for up to 30 days. 30 Tab 2   • ibuprofen (MOTRIN) 800 MG Tab Take 1 Tab by mouth every 8 hours as needed. 60 Tab 0   • norethindrone (MICRONOR) 0.35 MG tablet Take 1 Tab by mouth every day. 28 Tab 13   • Riboflavin 400 MG Cap Take 1 Cap by mouth every day. 90 Cap 3   • Coenzyme Q10 300 MG Cap Take 1 Cap by mouth every day. 90 Cap 3   • sumatriptan (IMITREX) 100 MG tablet Take 1 Tab by mouth Once PRN for Migraine. 10 Tab 11   • ondansetron (ZOFRAN ODT) 4 MG TABLET DISPERSIBLE Take 1 Tab by mouth every 6 hours as needed for Nausea. 10 Tab 11   • acyclovir (ZOVIRAX) 400 MG tablet Take 1 Tab by mouth 3 times a day. 30 Tab 2   • fluconazole (DIFLUCAN) 150 MG tablet Take 1 Tab by mouth every day. 1 Tab 0     No current  "facility-administered medications on file prior to visit.      No Known Allergies     Encounter Vitals  Standard Vitals  Vitals  Blood Pressure: 108/68  Temperature: 37 °C (98.6 °F)  Temp src: Temporal  Pulse: 94  Respiration: 15  Pulse Oximetry: 100 %  Height: 154.9 cm (5' 1\")  Weight: 46.7 kg (103 lb)(pt reported)  Encounter Vitals  Temperature: 37 °C (98.6 °F)  Temp src: Temporal  Blood Pressure: 108/68  Pulse: 94  Respiration: 15  Pulse Oximetry: 100 %  Weight: 46.7 kg (103 lb)(pt reported)  Height: 154.9 cm (5' 1\")  BMI (Calculated): 19.46  Physical exam   [unfilled]  Constitutional: Well-developed, well-nourished, good hygiene. Appears stated age.  Cardiovascular: RRR, with no murmurs, rubs or gallops. No carotid bruits.   Respiratory: Lungs CTA B/L, no W/R/R.   Abdomen: Soft Non-tender to Palpation. Non-distended.  Extremities: No peripheral edema, pedal pulses intact.   Skin: Warm, dry, intact. No rashes observed.  Eyes: Sclera anicteric   Funduscopic: Optic discs flat with no evidence of papilledema or pallor.   Neurologic:   Mental Status: Awake, alert, oriented x 3.   Speech: Fluent with normal prosody.   Memory: Able to recall recent and remote events accurately.    Concentration: Attentive. Able to focus on history and follow multi-step commands.              Fund of Knowledge: Appropriate   Cranial Nerves:    CN II: PERRL. No afferent pupillary defect.    CN III, IV, VI: Good eye closure, EOMI.     CN V: Facial sensation intact and symmetric.     CN VII: No facial asymmetry.     CN VIII: Hearing intact.     CN IX and X: Palate elevates symmetrically. Normal gag reflex.    CN XI: Symmetric shoulder shrug.     CN XII: Tongue midline.    Sensory: Intact light touch, vibration and temperature.    Coordination: No evidence of past-pointing on finger to nose testing, no dysdiadochokinesia. Heel to shin intact.             DTR's: 2+ throughout without clonus.    Babinski: Toes downgoing " bilaterally.   Romberg: Negative.   Movements: No tremors observed.   Musculoskeletal:    Strength: 5/5 in upper and lower extremities bilaterally.   Gait: Steady, narrow based.    Tone: Normal bulk and tone.   Joints: No swelling.   Lab Results   Component Value Date/Time    SODIUM 139 04/03/2018 10:33 AM    POTASSIUM 3.7 04/03/2018 10:33 AM    CHLORIDE 105 04/03/2018 10:33 AM    CO2 28 04/03/2018 10:33 AM    GLUCOSE 64 (L) 04/03/2018 10:33 AM    BUN 14 04/03/2018 10:33 AM    CREATININE 0.63 04/03/2018 10:33 AM      Lab Results   Component Value Date/Time    WBC 5.9 01/23/2018 11:32 AM    RBC 5.34 01/23/2018 11:32 AM    HEMOGLOBIN 15.0 01/23/2018 11:32 AM    HEMATOCRIT 46.6 01/23/2018 11:32 AM    MCV 87.3 01/23/2018 11:32 AM    MCH 28.1 01/23/2018 11:32 AM    MCHC 32.2 (L) 01/23/2018 11:32 AM    MPV 13.3 (H) 01/23/2018 11:32 AM    NEUTSPOLYS 67.60 01/23/2018 11:32 AM    LYMPHOCYTES 21.50 (L) 01/23/2018 11:32 AM    MONOCYTES 7.90 01/23/2018 11:32 AM    EOSINOPHILS 0.70 01/23/2018 11:32 AM    BASOPHILS 2.10 (H) 01/23/2018 11:32 AM      Current Outpatient Medications on File Prior to Visit   Medication Sig Dispense Refill   • ALPRAZolam (XANAX) 0.5 MG Tab Take 1 Tab by mouth at bedtime as needed for Sleep for up to 30 days. 30 Tab 2   • ibuprofen (MOTRIN) 800 MG Tab Take 1 Tab by mouth every 8 hours as needed. 60 Tab 0   • norethindrone (MICRONOR) 0.35 MG tablet Take 1 Tab by mouth every day. 28 Tab 13   • Riboflavin 400 MG Cap Take 1 Cap by mouth every day. 90 Cap 3   • Coenzyme Q10 300 MG Cap Take 1 Cap by mouth every day. 90 Cap 3   • sumatriptan (IMITREX) 100 MG tablet Take 1 Tab by mouth Once PRN for Migraine. 10 Tab 11   • ondansetron (ZOFRAN ODT) 4 MG TABLET DISPERSIBLE Take 1 Tab by mouth every 6 hours as needed for Nausea. 10 Tab 11   • acyclovir (ZOVIRAX) 400 MG tablet Take 1 Tab by mouth 3 times a day. 30 Tab 2   • fluconazole (DIFLUCAN) 150 MG tablet Take 1 Tab by mouth every day. 1 Tab 0     No current  facility-administered medications on file prior to visit.      Imaging   Reviewed    Impression and plan   Migraine headaches, well controlled   C/w Mag 500 mg daily   B2 400 mg daily   Coenzyme Q 10 300 mg daily  Fioricet 1-2 tabs prn max 10 tabs in one month and no more than 2 tabs in 24h and only as rescue for migraine   Will give Ubrelvy trial as rescue - she is interested to see if less sedation than Fioricet  Refill Fioricet   RTC 6 months     Deuce Martinez Md PhD

## 2020-03-18 NOTE — TELEPHONE ENCOUNTER
Processed a prior authorization for Ubrelvy, scanned into  pending approval . [employer] Case # [id number]

## 2020-03-19 ENCOUNTER — TELEPHONE (OUTPATIENT)
Dept: NEUROLOGY | Facility: MEDICAL CENTER | Age: 40
End: 2020-03-19

## 2020-03-19 DIAGNOSIS — G43.709 CHRONIC MIGRAINE W/O AURA W/O STATUS MIGRAINOSUS, NOT INTRACTABLE: ICD-10-CM

## 2020-03-19 RX ORDER — BUTALBITAL, ACETAMINOPHEN, CAFFEINE AND CODEINE PHOSPHATE 300; 50; 40; 30 MG/1; MG/1; MG/1; MG/1
1 CAPSULE ORAL
Qty: 10 CAP | Refills: 2 | Status: SHIPPED | OUTPATIENT
Start: 2020-03-19 | End: 2020-06-17

## 2020-03-19 ASSESSMENT — ENCOUNTER SYMPTOMS
RESPIRATORY NEGATIVE: 1
HEADACHES: 1
CONSTITUTIONAL NEGATIVE: 1
CARDIOVASCULAR NEGATIVE: 1
PHOTOPHOBIA: 0
NAUSEA: 0
PSYCHIATRIC NEGATIVE: 1
MUSCULOSKELETAL NEGATIVE: 1
GASTROINTESTINAL NEGATIVE: 1

## 2020-03-19 NOTE — TELEPHONE ENCOUNTER
butalbital-acetaminophen-caffeine-codeine (FIORICET W/CODEINE) -97-30 MG per capsule    Patient stated this is the script that was written in June. The new script  Written in yesterday does not have the codeine and will not work for her.  She is asking you to send new script to pharmacy asap.    Please advise    Thank you

## 2020-07-06 ENCOUNTER — TELEMEDICINE (OUTPATIENT)
Dept: MEDICAL GROUP | Facility: MEDICAL CENTER | Age: 40
End: 2020-07-06
Payer: COMMERCIAL

## 2020-07-06 VITALS — HEIGHT: 61 IN | TEMPERATURE: 97.3 F | BODY MASS INDEX: 19.83 KG/M2 | WEIGHT: 105 LBS

## 2020-07-06 DIAGNOSIS — Z00.00 ROUTINE GENERAL MEDICAL EXAMINATION AT A HEALTH CARE FACILITY: ICD-10-CM

## 2020-07-06 DIAGNOSIS — L65.9 HAIR LOSS: ICD-10-CM

## 2020-07-06 DIAGNOSIS — E28.2 PCOS (POLYCYSTIC OVARIAN SYNDROME): ICD-10-CM

## 2020-07-06 PROCEDURE — 99213 OFFICE O/P EST LOW 20 MIN: CPT | Mod: 95,CR | Performed by: NURSE PRACTITIONER

## 2020-07-06 RX ORDER — SPIRONOLACTONE 25 MG/1
25 TABLET ORAL
Qty: 90 TAB | Refills: 3 | Status: SHIPPED
Start: 2020-07-06 | End: 2020-10-05

## 2020-07-06 NOTE — PROGRESS NOTES
This encounter was conducted via Zoom meeting  Verbal consent was obtained. Patient's identity was verified.       CC: Patient requesting visit today because of hormone concerns as well as issues with hair loss.                                                                                                                                      HPI:   Felisha presents today with the following.    1. Hair loss  Patient had something similar in 2018 for which she was seen by dermatology and gynecology but nothing specific could be found.  She did feel that spironolactone was helpful and was prescribed this then but has been off it for a while.  She states her hair loss is returning again and she would like to have repeat thyroid and hormone testing as well as start back on spironolactone.    2. PCOS (polycystic ovarian syndrome)  Patient reports history of PCOS for which she was on metformin when trying to get pregnant with hemoglobin A1c at 5.0.  Except for her hair loss, she states she feels generally well but would like to have retesting done.  Lab work from January 2018 showed normal testosterone, FSH and LH, and TSH.    3. Routine general medical examination at a health care facility  Patient would like some routine blood work done.      Patient Active Problem List    Diagnosis Date Noted   • Mixed obsessional thoughts and acts 06/25/2019   • Migraine with aura and without status migrainosus, not intractable 04/02/2019   • KEVIN (generalized anxiety disorder) 07/19/2018   • HSV infection 04/25/2017   • PCOS (polycystic ovarian syndrome) 07/18/2012       Current Outpatient Medications   Medication Sig Dispense Refill   • spironolactone (ALDACTONE) 25 MG Tab Take 1 Tab by mouth every day. 90 Tab 3   • norethindrone (MICRONOR) 0.35 MG tablet Take 1 Tab by mouth every day. 28 Tab 13     No current facility-administered medications for this visit.          Allergies as of 07/06/2020   • (No Known Allergies)        ROS:   "Denies, chest pain, Shortness of breath, Edema.  Positive for hair loss    Temp 36.3 °C (97.3 °F)   Ht 1.549 m (5' 1\")   Wt 47.6 kg (105 lb)   BMI 19.84 kg/m²       Physical Exam:  Constitutional: Alert, no distress, well-groomed.  Skin: No rashes in visible areas.  Eye: Round. Conjunctiva clear, lNo icterus.   ENMT: Lips pink without lesions, good dentition, moist mucous membranes. Phonation normal.  Neck: No masses, no thyromegaly. Moves freely without pain.  CV: Pulse as reported by patient  Respiratory: Unlabored respiratory effort, no cough or audible wheeze  Psych: Alert and oriented x3, normal affect and mood.          Assessment and Plan.   39 y.o. female with the following issues.    1. Hair loss  Patient feels she responded well previously to Spironolactone so I will start her on this again.  I reviewed with her potential side effects including frequent urination and elevated potassium.  Computer interaction warning is not present.  I will have her do lab work in 2 weeks after starting the medicine to check electrolytes.  I told her if this does not improve then I would recommend a referral back to dermatology.  - spironolactone (ALDACTONE) 25 MG Tab; Take 1 Tab by mouth every day.  Dispense: 90 Tab; Refill: 3    2. PCOS (polycystic ovarian syndrome)  Patient has not needed metformin and I will check blood sugar and hormones.  - Comp Metabolic Panel; Future  - Lipid Profile; Future  - CBC WITHOUT DIFFERENTIAL; Future  - TSH; Future  - FSH/LH; Future  - TESTOSTERONE LC-MS/MX BIO/SHBG; Future  - spironolactone (ALDACTONE) 25 MG Tab; Take 1 Tab by mouth every day.  Dispense: 90 Tab; Refill: 3    3. Routine general medical examination at a health care facility    - Comp Metabolic Panel; Future  - Lipid Profile; Future  - CBC WITHOUT DIFFERENTIAL; Future  - TSH; Future  - FSH/LH; Future  - TESTOSTERONE LC-MS/MX BIO/SHBG; Future    "

## 2020-07-23 ENCOUNTER — HOSPITAL ENCOUNTER (OUTPATIENT)
Dept: LAB | Facility: MEDICAL CENTER | Age: 40
End: 2020-07-23
Attending: NURSE PRACTITIONER
Payer: COMMERCIAL

## 2020-07-23 DIAGNOSIS — Z00.00 ROUTINE GENERAL MEDICAL EXAMINATION AT A HEALTH CARE FACILITY: ICD-10-CM

## 2020-07-23 DIAGNOSIS — E28.2 PCOS (POLYCYSTIC OVARIAN SYNDROME): ICD-10-CM

## 2020-07-23 LAB
CHOLEST SERPL-MCNC: 170 MG/DL (ref 100–199)
ERYTHROCYTE [DISTWIDTH] IN BLOOD BY AUTOMATED COUNT: 44.1 FL (ref 35.9–50)
FASTING STATUS PATIENT QL REPORTED: NORMAL
HCT VFR BLD AUTO: 40 % (ref 37–47)
HDLC SERPL-MCNC: 79 MG/DL
HGB BLD-MCNC: 13.2 G/DL (ref 12–16)
LDLC SERPL CALC-MCNC: 84 MG/DL
MCH RBC QN AUTO: 30.2 PG (ref 27–33)
MCHC RBC AUTO-ENTMCNC: 33 G/DL (ref 33.6–35)
MCV RBC AUTO: 91.5 FL (ref 81.4–97.8)
PLATELET # BLD AUTO: 210 K/UL (ref 164–446)
PMV BLD AUTO: 13.2 FL (ref 9–12.9)
RBC # BLD AUTO: 4.37 M/UL (ref 4.2–5.4)
TRIGL SERPL-MCNC: 34 MG/DL (ref 0–149)
WBC # BLD AUTO: 5.3 K/UL (ref 4.8–10.8)

## 2020-07-23 PROCEDURE — 80061 LIPID PANEL: CPT

## 2020-07-23 PROCEDURE — 84443 ASSAY THYROID STIM HORMONE: CPT

## 2020-07-23 PROCEDURE — 83001 ASSAY OF GONADOTROPIN (FSH): CPT

## 2020-07-23 PROCEDURE — 80053 COMPREHEN METABOLIC PANEL: CPT

## 2020-07-23 PROCEDURE — 83002 ASSAY OF GONADOTROPIN (LH): CPT

## 2020-07-23 PROCEDURE — 84270 ASSAY OF SEX HORMONE GLOBUL: CPT

## 2020-07-23 PROCEDURE — 36415 COLL VENOUS BLD VENIPUNCTURE: CPT

## 2020-07-23 PROCEDURE — 85027 COMPLETE CBC AUTOMATED: CPT

## 2020-07-23 PROCEDURE — 84403 ASSAY OF TOTAL TESTOSTERONE: CPT

## 2020-07-23 PROCEDURE — 84402 ASSAY OF FREE TESTOSTERONE: CPT

## 2020-07-24 LAB
ALBUMIN SERPL BCP-MCNC: 4.4 G/DL (ref 3.2–4.9)
ALBUMIN/GLOB SERPL: 1.8 G/DL
ALP SERPL-CCNC: 43 U/L (ref 30–99)
ALT SERPL-CCNC: 15 U/L (ref 2–50)
ANION GAP SERPL CALC-SCNC: 15 MMOL/L (ref 7–16)
AST SERPL-CCNC: 20 U/L (ref 12–45)
BILIRUB SERPL-MCNC: 0.8 MG/DL (ref 0.1–1.5)
BUN SERPL-MCNC: 14 MG/DL (ref 8–22)
CALCIUM SERPL-MCNC: 9.2 MG/DL (ref 8.5–10.5)
CHLORIDE SERPL-SCNC: 103 MMOL/L (ref 96–112)
CO2 SERPL-SCNC: 22 MMOL/L (ref 20–33)
CREAT SERPL-MCNC: 0.75 MG/DL (ref 0.5–1.4)
FSH SERPL-ACNC: 4.9 MIU/ML
GLOBULIN SER CALC-MCNC: 2.4 G/DL (ref 1.9–3.5)
GLUCOSE SERPL-MCNC: 80 MG/DL (ref 65–99)
LH SERPL-ACNC: 5 IU/L
POTASSIUM SERPL-SCNC: 4 MMOL/L (ref 3.6–5.5)
PROT SERPL-MCNC: 6.8 G/DL (ref 6–8.2)
SODIUM SERPL-SCNC: 140 MMOL/L (ref 135–145)
TSH SERPL DL<=0.005 MIU/L-ACNC: 1.88 UIU/ML (ref 0.38–5.33)

## 2020-07-27 LAB
SHBG SERPL-SCNC: 92 NMOL/L (ref 30–135)
TESTOST FREE SERPL-MCNC: 1.3 PG/ML (ref 1.3–9.2)
TESTOST SERPL-MCNC: 16 NG/DL (ref 9–55)
TESTOSTERONE.FREE+WB SERPL-MCNC: 3.7 NG/DL (ref 4.1–25.5)

## 2020-09-09 DIAGNOSIS — L65.9 HAIR LOSS: ICD-10-CM

## 2020-10-05 ENCOUNTER — TELEMEDICINE (OUTPATIENT)
Dept: BEHAVIORAL HEALTH | Facility: CLINIC | Age: 40
End: 2020-10-05
Payer: COMMERCIAL

## 2020-10-05 VITALS — WEIGHT: 105 LBS | BODY MASS INDEX: 19.83 KG/M2 | HEIGHT: 61 IN

## 2020-10-05 DIAGNOSIS — F60.5 OBSESSIVE COMPULSIVE PERSONALITY DISORDER (HCC): ICD-10-CM

## 2020-10-05 DIAGNOSIS — F41.1 GENERALIZED ANXIETY DISORDER: ICD-10-CM

## 2020-10-05 PROBLEM — F42.2 MIXED OBSESSIONAL THOUGHTS AND ACTS: Status: RESOLVED | Noted: 2019-06-25 | Resolved: 2020-10-05

## 2020-10-05 PROCEDURE — 99203 OFFICE O/P NEW LOW 30 MIN: CPT | Mod: 95,CR | Performed by: PSYCHIATRY & NEUROLOGY

## 2020-10-05 RX ORDER — DOXYLAMINE SUCCINATE 25 MG/1
25 TABLET ORAL
COMMUNITY

## 2020-10-05 RX ORDER — SERTRALINE HYDROCHLORIDE 25 MG/1
TABLET, FILM COATED ORAL
Qty: 27 TAB | Refills: 1 | Status: SHIPPED | OUTPATIENT
Start: 2020-10-05 | End: 2020-11-04

## 2020-10-05 SDOH — HEALTH STABILITY: MENTAL HEALTH: HOW OFTEN DO YOU HAVE A DRINK CONTAINING ALCOHOL?: 4 OR MORE TIMES A WEEK

## 2020-10-05 SDOH — HEALTH STABILITY: MENTAL HEALTH: HOW MANY STANDARD DRINKS CONTAINING ALCOHOL DO YOU HAVE ON A TYPICAL DAY?: 1 OR 2

## 2020-10-05 ASSESSMENT — FIBROSIS 4 INDEX: FIB4 SCORE: 0.93

## 2020-10-05 NOTE — PROGRESS NOTES
INITIAL PSYCHIATRY VIRTUAL VISIT EVALUATION      Chief Complaint   Patient presents with   • Mental Heatlh Problem     OCD issues       This evaluation was conducted via Zoom using secure and encrypted videoconferencing technology. The patient was in a private location in the [facility].    The patient's identity was confirmed and verbal consent was obtained for this virtual visit.    History Of Present Illness:  Felisha Pedraza is a 40 y.o. old female with history of generalized anxiety disorder, OCD, migraines, PCOS comes in today to establish care and for evaluation of anxiety.  She has struggled with anxiety for most of her life but lately the anxiety has been really overwhelming to the point where she wants to discuss a daily medication.  She remembers that as a child she had significant separation anxiety and would worry a lot about her parents dying.  She has taken Xanax on as-needed basis for the last 2 years and it does help with her as needed anxiety symptoms.  She was prescribed Xanax about 2 years ago when she started having panic attacks after the birth of her second child.  She uses quarter to 1/2 tablet of Xanax few times a week to relax herself or to help with sleep.  She uses over-the-counter Unisom 25 mg on a daily basis as well to help with insomnia.  She has never taken a daily medication for anxiety.  She was briefly in therapy last year which was partially beneficial.  She has tried several over-the-counter medications but they have not been beneficial on a consistent basis.  She feels anxious and overwhelmed on a day-to-day basis which causes irritability for her as well.  She endorses racing thoughts secondary to anxiety and having a hard time relaxing herself.  She endorses insomnia because of anxiety.  She mentions that there are times where she wakes up feeling anxious and has a hard time going back to bed.  She often thinks about the worst case scenario and has a hard time making  decisions.  She also reports that she tends to be very organized and clean but it can cause some stress in her life.  She likes things in a particular way and make sure that they stay that way.  She has a cleaning lady and after she is done she rearranges her things.  She does not like to delegate tasks to her  as she thinks that he does not do the job as well as she can do.  She at times feels that she spends way too much time cleaning rather than spending time with her family.  She has a hard time relaxing during medications as well.  She does not like to procrastinate and plans for everything before vacation but if something unexpected happens it is really hard for her to feel calm.  She is okay with keeping her house and clean for a day or 2 but cannot stay disorganized for longer than that.  She does not like people coming over because after they are gone she has to spend several hours cleaning it up.  She notes that her mother and her brother are the same day and growing up she is a lot of the same symptoms in her mother.  She denies any checking behaviors including checking doorknobs or stools or excessive fear of germs etc.  She denies struggles with depression.  She denies having thoughts of wanting to hurt herself or others.    Current psychiatric medications - Xanax 0.5 mg daily as needed for anxiety (x 2 years)    Past Psychiatric History:  She denies history of suicide attempt or prior inpatient psychiatry hospitalization.  Previous medication trials - None    SAFETY ASSESSMENT - SELF  Does patient acknowledge current or past symptoms of dangerousness to self? no  Does parent/significant other report patient has current or past symptoms of dangerousness to self? N\A  Does presenting problem suggest symptoms of dangerousness to self? No    SAFETY ASSESSMENT - OTHERS  Does patient acknowledge current or past symptoms of aggressive behavior or risk to others? no  Does parent/significant other report  patient has current or past symptoms of aggressive behavior or risk to others?  N\A  Does presenting problem suggest symptoms of dangerousness to others? No     CURRENT RISK:       Suicidal: Low       Homicidal: Low       Self-Harm: Low       Relapse: Not applicable       Crisis Safety Plan Reviewed Not Indicated    Past Medical/Surgical History:  Past Medical History:   Diagnosis Date   • Migraine    • Other specified symptom associated with female genital organs    • Panic disorder    • PCOS (polycystic ovarian syndrome)      Past Surgical History:   Procedure Laterality Date   • REPEAT C SECTION  9/2/2017    Procedure: REPEAT C SECTION;  Surgeon: Sara Guzman M.D.;  Location: LABOR AND DELIVERY;  Service: Labor and Delivery   • PELVISCOPY  4/3/2013    Performed by Shin Mayo M.D. at SURGERY SAME DAY [facility] ORS   • CYST EXCISION  4/3/2013    Performed by Shin Mayo M.D. at SURGERY SAME DAY [facility] ORS   • OTHER ORTHOPEDIC SURGERY  2011    clavical right       Family Psychiatric History:  Paternal grand mother - unknown mental health problems    Substance Use/Addiction History:  Alcohol - She has been drinking 2 alcoholic drinks on a daily basis for a long time.  She fluctuates between 2 wine glasses or 2 tequila shots normally.  She denies drinking more during vacations or holidays or on the weekends.  She denies a history of DUI, personal or professional struggles because of alcohol, hospitalizations secondary to intoxication or detox.   Nicotine - Denies  Cannabis - She reports using cannabis every now and then.  She did try cannabis on a regular basis to see if that would help calm down her anxiety but she did not have luck.  She did try kratom as well during the pandemic and felt that it was beneficial but she noticed her less of the side effects so she stopped taking it.  Illicit drugs - She reports struggling with opioid medication misuse/abuse over 10 years ago.  She was prescribed opiate  "medications after a collarbone fracture and she started using more than what was prescribed and sometimes got it from other sources as well.  She briefly went on Suboxone to help with cravings and has been off opioids for over 10 years now.    Social History:  She is , 2 daughters who are 5 and 3 years old, employed full-time as a , lives with her family in [place].    Allergies:  Patient has no known allergies.    Medications:  Current Outpatient Medications   Medication Sig Dispense Refill   • doxylamine (UNISOM SLEEPTABS) 25 MG Tab tablet Take 25 mg by mouth every bedtime.     • MAGNESIUM PO Take  by mouth.     • Cyanocobalamin (B-12 PO) Take  by mouth.     • Coenzyme Q10 (COQ-10 PO) Take  by mouth.     • Ascorbic Acid (VITAMIN C PO) Take  by mouth.     • Nutritional Supplements (DHEA PO) Take  by mouth.     • Multiple Vitamins-Minerals (HAIR SKIN AND NAILS FORMULA PO) Take  by mouth.     • VITAMIN D PO Take  by mouth.     • Multiple Vitamins-Minerals (ZINC PO) Take  by mouth.     • sertraline (ZOLOFT) 25 MG tablet Take 0.5 Tabs by mouth every day for 7 days, THEN 1 Tab every day for 23 days. 27 Tab 1   • ALPRAZolam (XANAX) 0.5 MG Tab TAKE ONE TABLET BY MOUTH AT BEDTIME AS NEEDED FOR SLEEP 30 Tab 0   • norethindrone (MICRONOR) 0.35 MG tablet Take 1 Tab by mouth every day. 28 Tab 13     No current facility-administered medications for this visit.        Review of Symptoms:  Constitutional - Positive for fatigue  Eyes - Negative for blurry vision  HEENT - Negative for sore throat  Respiratory - Negative for shortness of breath, cough  CVS - Negative for chest pain, palpitations  GI - Negative for nausea, vomiting, abdominal pain, diarrhea, constipation  Skin - Negative for rash  Musculoskeletal - Negative for back pain  Neurological - Negative for headaches  Psychiatric - Positive for anxiety, poor sleep    Physical Examination:  Vital signs: Ht 1.549 m (5' 1\")   Wt 47.6 kg (105 lb)   BMI " "19.84 kg/m²     Musculoskeletal: No abnormal movements.     Mental Status Evaluation:   General: Young white female, dressed in casual attire, good grooming and hygiene, in no apparent distress, calm and cooperative, good eye contact, no psychomotor agitation or retardation  Orientation: Alert and oriented to person, place and time  Recent and remote memory: Intact  Attention span and concentration: Intact  Speech: Spontaneous, normal rate, rhythm and tone  Thought Process: Linear, logical and goal directed  Thought Content: Denies suicidal or homicidal ideations, intent or plan  Perception: Denies auditory or visual hallucinations. No delusions noted  Associations: Intact  Language: Appropriate  Fund of knowledge and vocabulary: Adequate  Mood: \"alright\"  Affect: Anxious, mood congruent  Insight: Good  Judgment: Good    Depression screening:  Depression Screen (PHQ-2/PHQ-9) 1/23/2018 4/2/2019 3/17/2020   PHQ-2 Total Score - - -   PHQ-2 Total Score 0 0 0   PHQ-9 Total Score - - -     Interpretation of PHQ-9 Total Score   Score Severity   1-4 No Depression   5-9 Mild Depression   10-14 Moderate Depression   15-19 Moderately Severe Depression   20-27 Severe Depression    Medical Records/Labs/Diagnostic Tests Reviewed:  [facility] records - appropriate refills, no abuse suspected     Impression:  Young female with lifelong symptoms of anxiety as well as obsessive-compulsive personality disorder rather than obsessive-compulsive disorder.  She uses Xanax on as-needed basis to calm down her anxiety but is agreeable to an SNRI medication for better long-term control of her anxiety symptoms.    1.  Generalized anxiety disorder  2.  Obsessive-compulsive personality disorder    Plan:  1.  Start Zoloft 12.5 mg daily for 1 week followed by 25 mg daily for anxiety symptoms.  She has never taken an SSRI medication and is hesitant about the side effects and is agreeable to starting at a low dose to avoid side effects.  Discussed 4-6 " week period to assess for efficacy. Discussed side effects including nausea/vomiting, abdominal discomfort/pain, diarrhea, headaches, drowsiness, sleep disturbances, initial transient worsening of anxiety, agitation, hypertension, fatigue, excessive sweating, dry mouth, sexual dysfunction etc.  2.  Continue Xanax 0.125 to 0.25 mg daily as needed for anxiety symptoms.  Discussed mechanism of action of Xanax and how it is a good short-term medication for anxiety but carries a risk of abuse, dependence and tolerance potential when taken on a daily basis.  Advised her to continue using Xanax only for overwhelming anxiety.  Not prescribed today.  3.  Discussed FDA recommendation of healthy drinking which is considered has 1 alcoholic drink a day and advised her to try and cut down her daily alcohol intake to 0-1 drinks.  Discussed that medications can be relatively inefficacious for anxiety and alcohol has been used on a regular basis.  Discussed benefit from combined use of both alcohol and Xanax.  4.  I have advised her to restart psychotherapy.  She would like to see a different therapist in our clinic and let her know that I will pass this on to the clinic supervisor who will reach out to her to switch to a different therapist.    Return to clinic in 6 weeks or sooner if symptoms worsen    The proposed treatment plan was discussed with the patient who was provided the opportunity to ask questions and make suggestions regarding alternative treatment. Patient verbalized understanding and expressed agreement with the plan.     Meryl Patel M.D.  10/05/20    This note was created using voice recognition software (Dragon). The accuracy of the dictation is limited by the abilities of the software. I have reviewed the note prior to signing, however some errors in grammar and context are still possible. If you have any questions related to this note please do not hesitate to contact our office.

## 2020-12-21 ENCOUNTER — TELEMEDICINE (OUTPATIENT)
Dept: MEDICAL GROUP | Facility: MEDICAL CENTER | Age: 40
End: 2020-12-21
Payer: COMMERCIAL

## 2020-12-21 VITALS — BODY MASS INDEX: 19.83 KG/M2 | RESPIRATION RATE: 16 BRPM | WEIGHT: 105 LBS | HEIGHT: 61 IN

## 2020-12-21 DIAGNOSIS — Z00.00 ROUTINE GENERAL MEDICAL EXAMINATION AT A HEALTH CARE FACILITY: ICD-10-CM

## 2020-12-21 DIAGNOSIS — E28.2 PCOS (POLYCYSTIC OVARIAN SYNDROME): ICD-10-CM

## 2020-12-21 DIAGNOSIS — F41.1 GENERALIZED ANXIETY DISORDER: ICD-10-CM

## 2020-12-21 PROCEDURE — 99213 OFFICE O/P EST LOW 20 MIN: CPT | Mod: 95,CR | Performed by: NURSE PRACTITIONER

## 2020-12-21 RX ORDER — METFORMIN HYDROCHLORIDE 500 MG/1
500 TABLET, EXTENDED RELEASE ORAL DAILY
Qty: 90 TAB | Refills: 3 | Status: SHIPPED | OUTPATIENT
Start: 2020-12-21 | End: 2021-12-14 | Stop reason: SDUPTHER

## 2020-12-21 RX ORDER — ALPRAZOLAM 0.5 MG/1
0.5 TABLET ORAL NIGHTLY PRN
Qty: 30 TAB | Refills: 2 | Status: SHIPPED | OUTPATIENT
Start: 2020-12-21 | End: 2021-07-06 | Stop reason: SDUPTHER

## 2020-12-21 ASSESSMENT — FIBROSIS 4 INDEX: FIB4 SCORE: 0.93

## 2020-12-21 NOTE — PROGRESS NOTES
Virtual Visit: Established Patient   This visit was conducted via Zoom  using secure and encrypted videoconferencing technology. The patient was in a private location in the state of [place].    The patient's identity was confirmed and verbal consent was obtained for this virtual visit.      CC: Patient requesting telemedicine visit today to talk about anxiety and PCOS.                                                                                                                                     HPI:   Felisha presents today with the following.    1. KEVIN (generalized anxiety disorder)  Patient has history of anxiety and obsessive-compulsive disorder for which she goes to counseling and psychiatry.  She was recently prescribed Zoloft but was anxious about taking the medicine and decided to only stay with the Xanax.   review shows that she typically uses about 10 tablets/month when the anxiety is severe.  She states she does not take it with alcohol or other medicines and previous ORT showed low risk for addiction.    2. PCOS (polycystic ovarian syndrome)  Patient at one time was on Metformin for her PCOS and would like to get back on this.  Her most recent blood sugars were normal and she saw her gynecologist approximately 1 year ago with normal Pap smears.    3. Routine general medical examination at a health care facility  Patient due for routine blood work      Patient Active Problem List    Diagnosis Date Noted   • Obsessive compulsive personality disorder (HCC) 10/05/2020   • Migraine with aura and without status migrainosus, not intractable 04/02/2019   • KEVIN (generalized anxiety disorder) 07/19/2018   • HSV infection 04/25/2017   • PCOS (polycystic ovarian syndrome) 07/18/2012       Current Outpatient Medications   Medication Sig Dispense Refill   • ALPRAZolam (XANAX) 0.5 MG Tab Take 1 Tab by mouth at bedtime as needed for Sleep for up to 30 days. 30 Tab 2   • metFORMIN ER (GLUCOPHAGE XR) 500 MG TABLET  "SR 24 HR Take 1 Tab by mouth every day. 90 Tab 3   • MAGNESIUM PO Take  by mouth.     • Coenzyme Q10 (COQ-10 PO) Take  by mouth.     • Ascorbic Acid (VITAMIN C PO) Take  by mouth.     • Multiple Vitamins-Minerals (HAIR SKIN AND NAILS FORMULA PO) Take  by mouth.     • VITAMIN D PO Take  by mouth.     • Multiple Vitamins-Minerals (ZINC PO) Take  by mouth.     • doxylamine (UNISOM SLEEPTABS) 25 MG Tab tablet Take 25 mg by mouth every bedtime.     • Cyanocobalamin (B-12 PO) Take  by mouth.     • Nutritional Supplements (DHEA PO) Take  by mouth.     • norethindrone (MICRONOR) 0.35 MG tablet Take 1 Tab by mouth every day. 28 Tab 13     No current facility-administered medications for this visit.          Allergies as of 12/21/2020   • (No Known Allergies)        ROS:  Denies, chest pain, Shortness of breath, Edema.  Positive for anxiety    Resp 16   Ht 1.549 m (5' 1\")   Wt 47.6 kg (105 lb)   BMI 19.84 kg/m²       Physical Exam:  Constitutional: Alert, no distress, well-groomed.  Skin: No rashes in visible areas.  Eye: Round. Conjunctiva clear, lNo icterus.   ENMT: Lips pink without lesions, good dentition, moist mucous membranes. Phonation normal.  Neck: No masses, no thyromegaly. Moves freely without pain.  CV: Pulse as reported by patient  Respiratory: Unlabored respiratory effort, no cough or audible wheeze  Psych: Alert and oriented x3, normal affect and mood.          Assessment and Plan.   40 y.o. female with the following issues.    1. KEVIN (generalized anxiety disorder)  I have refilled patient's medicines and I told her to use this only as needed and not with alcohol or any other medications that could interact with this.  I would like to see her go through no more than 5 or 6/month.  She will be due for a new drug contract at her next visit and I told her this should begin an office visit.   shows she is not getting medicines from other offices.  I advised her she should reconsider starting on the " Zoloft.  - ALPRAZolam (XANAX) 0.5 MG Tab; Take 1 Tab by mouth at bedtime as needed for Sleep for up to 30 days.  Dispense: 30 Tab; Refill: 2    2. PCOS (polycystic ovarian syndrome)  I will start her back on Metformin and have her do lab work in 3 months and she is to follow back with her gynecologist regarding lab work from earlier this year.  - metFORMIN ER (GLUCOPHAGE XR) 500 MG TABLET SR 24 HR; Take 1 Tab by mouth every day.  Dispense: 90 Tab; Refill: 3  - HEMOGLOBIN A1C; Future  - Comp Metabolic Panel; Future  - VITAMIN D,25 HYDROXY; Future    3. Routine general medical examination at a health care facility    - HEMOGLOBIN A1C; Future  - Comp Metabolic Panel; Future  - VITAMIN D,25 HYDROXY; Future

## 2021-06-23 ENCOUNTER — TELEMEDICINE (OUTPATIENT)
Dept: BEHAVIORAL HEALTH | Facility: CLINIC | Age: 41
End: 2021-06-23
Payer: COMMERCIAL

## 2021-06-23 DIAGNOSIS — F60.5 OBSESSIVE COMPULSIVE PERSONALITY DISORDER (HCC): ICD-10-CM

## 2021-06-23 DIAGNOSIS — F41.1 GENERALIZED ANXIETY DISORDER: ICD-10-CM

## 2021-06-23 PROCEDURE — 90791 PSYCH DIAGNOSTIC EVALUATION: CPT | Performed by: SOCIAL WORKER

## 2021-06-23 ASSESSMENT — ANXIETY QUESTIONNAIRES
4. TROUBLE RELAXING: NEARLY EVERY DAY
5. BEING SO RESTLESS THAT IT IS HARD TO SIT STILL: SEVERAL DAYS
7. FEELING AFRAID AS IF SOMETHING AWFUL MIGHT HAPPEN: SEVERAL DAYS
1. FEELING NERVOUS, ANXIOUS, OR ON EDGE: MORE THAN HALF THE DAYS
GAD7 TOTAL SCORE: 11
3. WORRYING TOO MUCH ABOUT DIFFERENT THINGS: SEVERAL DAYS
IF YOU CHECKED OFF ANY PROBLEMS ON THIS QUESTIONNAIRE, HOW DIFFICULT HAVE THESE PROBLEMS MADE IT FOR YOU TO DO YOUR WORK, TAKE CARE OF THINGS AT HOME, OR GET ALONG WITH OTHER PEOPLE: NOT DIFFICULT AT ALL
6. BECOMING EASILY ANNOYED OR IRRITABLE: MORE THAN HALF THE DAYS
2. NOT BEING ABLE TO STOP OR CONTROL WORRYING: SEVERAL DAYS

## 2021-06-23 NOTE — PROGRESS NOTES
"[facility]   Initial Assessment    This visit was conducted via Zoom using secure and encrypted videoconferencing technology.  The patient was in a private location in the state of [place].  The patient's identity was confirmed and verbal consent was obtained for this virtual visit.      Name: Felisha Pedraza  MRN: [id number]  : 1980  Age: 40 y.o.  Date of assessment: 2021  PCP: GUILLERMINA Najera  Persons in attendance: Patient  Total session time: 60 minutes      CHIEF COMPLAINT AND HISTORY OF PRESENTING PROBLEM:  (as stated by Patient):  Felisha Pedraza is a 40 y.o., White female referred for assessment by No ref. provider found.  Primary presenting issue includes anxiety.       40 yr  female, 2 children 6, 4.  not supportive, not sympathetic.   Pt has seen psychiatry in past and dx OCPD, indicates need for cleanliness and orderliness which causes anxiety and gets worse during the school year and and with age. Pt with difficulty staying asleep, wakes up to mind racing and panic attack. Mood is flat, tense today. Pt remarks  \"doesn't do things right and I would never leave him with kids over night because I would come home to a mess.\" Pt recently prescribed Zoloft, took it for 2 days, googled effects, and stopped stopped taking it. Pt instead takes Xanax /-/2, 2-3x week.   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  She reports post partum anxiety with last child; PTOS dx, more recently has had hormonal  hair loss, and is taking DHEA to boosts testosterone.  Pt reports daily alcohol intake as 1-2 martinis, shots of Tequila or wine. Marijuana use 1 time every other month, previous kenneth of substances when she was younger.  Family hx of paternal gm with mental health. Pt does not not exercise and has no hobbies other than watching real Housewifes, getting massages, or going to spa day or listening to Podcasts. " Parents will take kids so she can relax.         BEHAVIORAL HEALTH TREATMENT HISTORY  Does patient/parent report a history of prior behavioral health treatment for patient? Yes, OCD; psychiatry at [facility], as teen over bf  History of untreated behavioral health issues identified? Yes  Does patient/parent report change in appetite or weight loss/gain? No, disordered eating in past - body image  Does patient/parent report physical pain? No              Indicate if pain is acute or chronic, and location: NA              Pain scale rating:       KEVIN-7 Questionnaire    Feeling nervous, anxious, or on edge:    Not being able to sop or control worrying:    Worrying too much about different things:    Trouble relaxing:    Being so restless that it's hard to sit still:    Becoming easily annoyed or irritable:    Feeling afraid as if something awful might happen:    Total:  11    Interpretation of KEVIN 7 Total Score   Score Severity :  0-4 No Anxiety   5-9 Mild Anxiety  10-14 Moderate Anxiety  15-21 Severe Anxiety        FAMILY/SOCIAL HISTORY  Current living situation/household members: , 2 children  Does patient/parent report a family history of behavioral health issues, diagnoses, or treatment?   Family History   Problem Relation Age of Onset   • Thyroid Mother         hyperthyroidsim    • Cancer Father    • Hypertension Father           EMPLOYMENT/RESOURCES  Is the patient currently employed? Yes  Does the patient/parent report adequate financial resources? Yes       HISTORY:  Does patient report current or past enlistment? No               [If yes, complete below items]  Does patient report history of exposure to combat? No      SPIRITUAL/CULTURAL/IDENTITY:  What are the patient's/family's spiritual beliefs or practices? none    ABUSE/NEGLECT/TRAUMA SCREENING  Does patient report feeling “unsafe” in his/her home, or afraid of anyone? No  Does patient report any history of physical, sexual, or emotional abuse?  No  Is there evidence of neglect by self? No  Is there evidence of neglect by a caregiver? No                                                                                                          SAFETY ASSESSMENT - SELF  Does patient acknowledge current or past symptoms of dangerousness to self? No  Recent change in frequency/specificity/intensity of suicidal thoughts or self-harm behavior? No  Current access to firearms, medications, or other identified means of suicide/self-harm? No  If yes, willing to restrict access to means of suicide/self-harm? NA      Current Suicide Risk: Low  Crisis Safety Plan completed and copy given to patient: No      SAFETY ASSESSMENT - OTHERS  Recent change in frequency/specificity/intensity of thoughts or threats to harm others? No  If Yes:  Current access to firearms/other identified means of harm?   If yes, willing to restrict access to weapons/means of harm?     Current Homicide Risk:  Not applicable  Crisis Safety Plan completed and copy given to patient? No  Based on information provided during the current assessment, is a mandated “duty to warn” being exercised? No      SUBSTANCE USE/ADDICTION HISTORY  Patient denies substance/addictive behaviors No    If No:  Is there a family history of substance use/addiction? Yes  Does patient acknowledge or parent/significant other report use of/dependence on substances? No  Last time patient used alcohol: daily shot tequila, martini, 1-2 glasses wine  Within the past week? Yes  Last time patient used marijuana: occasionally  Within the past month? Yes, 1 time in last 2 months  Any other street drugs ever tried even once? No  Any use of prescription medications/pills without a prescription, or for reasons others than originally prescribed?  Yes, younger  Any other addictive behavior reported (gambling, shopping, sex)? Yes , overspend    Drug History:  Amphetamine:  Amphetamine frequency: Never used      Cannibis:  Cannabis frequency:  Past rare use      Cocaine:  Cocaine frequency: Never used      Ecstasy:      Hallucinogen:  Hallucinogen frequency: Never used      Inhalant:   Inhalant frequency: Never used      Opiate:  Opiate frequency: Past occasional use  Cannabis frequency: Past rare use      Other:      Sedative:   Sedative frequency: Never used          MENTAL STATUS/OBSERVATIONS              Participation: Active verbal participation, Defensive and Resistant  Grooming: Casual  Orientation:Fully Oriented   Behavior: Calm, intense  Eye contact: Good          Mood:Euthymic  Affect:Flat  Thought process: Logical  Thought content:  Within normal limits and Obsessions  Speech: Rate within normal limits and Volume within normal limits  Perception: Within normal limits  Memory: No gross evidence of memory deficits  Insight: Adequate  Judgment:  Good  Other:               Family/couple interaction observations: strained marital      Patient's motivation/readiness for change:     Topics addressed in psychotherapy include: identifying goals to work on    Care plan completed: Yes  Does patient express agreement with the above plan? Yes     Diagnosis:  1. KEVIN     Referral appointment(s) scheduled? No       Shasha Toro L.C.S.W.

## 2021-06-29 DIAGNOSIS — F41.1 GENERALIZED ANXIETY DISORDER: ICD-10-CM

## 2021-06-29 RX ORDER — ALPRAZOLAM 0.5 MG/1
0.5 TABLET ORAL NIGHTLY PRN
Qty: 30 TABLET | Refills: 2 | OUTPATIENT
Start: 2021-06-29 | End: 2021-07-29

## 2021-06-29 NOTE — TELEPHONE ENCOUNTER
Patient had a telemedicine visit in December.     Was the patient seen in the last year in this department? Yes     Does patient have an active prescription for medications requested? No     Received Request Via: Pharmacy

## 2021-06-30 DIAGNOSIS — F41.1 GENERALIZED ANXIETY DISORDER: ICD-10-CM

## 2021-06-30 NOTE — TELEPHONE ENCOUNTER
This prescription is a request for a controlled substance refill.  This cannot be performed if patient's have not been seen within the previous 6 months.  Patient will need to make and keep an appointment to continue this prescription.

## 2021-07-01 RX ORDER — ALPRAZOLAM 0.5 MG/1
0.5 TABLET ORAL NIGHTLY PRN
Qty: 30 TABLET | Refills: 2 | OUTPATIENT
Start: 2021-07-01 | End: 2021-07-31

## 2021-07-01 NOTE — TELEPHONE ENCOUNTER
This is a request for alprazolam.  Patient has not been seen since December.  This prescription is a request for a controlled substance refill.  This cannot be performed if patient's have not been seen within the previous 6 months.  Patient will need to make and keep an appointment to continue this prescription.

## 2021-07-06 ENCOUNTER — TELEMEDICINE (OUTPATIENT)
Dept: MEDICAL GROUP | Facility: MEDICAL CENTER | Age: 41
End: 2021-07-06
Payer: COMMERCIAL

## 2021-07-06 VITALS — WEIGHT: 107 LBS | BODY MASS INDEX: 20.22 KG/M2

## 2021-07-06 DIAGNOSIS — F41.1 GENERALIZED ANXIETY DISORDER: ICD-10-CM

## 2021-07-06 DIAGNOSIS — G43.109 MIGRAINE WITH AURA AND WITHOUT STATUS MIGRAINOSUS, NOT INTRACTABLE: ICD-10-CM

## 2021-07-06 DIAGNOSIS — E28.2 PCOS (POLYCYSTIC OVARIAN SYNDROME): ICD-10-CM

## 2021-07-06 PROCEDURE — 99213 OFFICE O/P EST LOW 20 MIN: CPT | Mod: 95 | Performed by: STUDENT IN AN ORGANIZED HEALTH CARE EDUCATION/TRAINING PROGRAM

## 2021-07-06 RX ORDER — BIMATOPROST 3 UG/ML
SOLUTION TOPICAL
COMMUNITY
Start: 2021-05-14

## 2021-07-06 RX ORDER — IBUPROFEN 800 MG/1
800 TABLET ORAL EVERY 8 HOURS PRN
Qty: 30 TABLET | Refills: 1 | Status: SHIPPED | OUTPATIENT
Start: 2021-07-06

## 2021-07-06 RX ORDER — ALPRAZOLAM 0.5 MG/1
0.5 TABLET ORAL NIGHTLY PRN
Qty: 30 TABLET | Refills: 2 | Status: SHIPPED | OUTPATIENT
Start: 2021-07-06 | End: 2021-10-04

## 2021-07-06 RX ORDER — CLINDAMYCIN PHOSPHATE AND BENZOYL PEROXIDE 10; 50 MG/G; MG/G
GEL TOPICAL
COMMUNITY
Start: 2021-04-28 | End: 2021-12-20

## 2021-07-06 ASSESSMENT — PATIENT HEALTH QUESTIONNAIRE - PHQ9: CLINICAL INTERPRETATION OF PHQ2 SCORE: 0

## 2021-07-06 ASSESSMENT — FIBROSIS 4 INDEX: FIB4 SCORE: 0.93

## 2021-07-06 NOTE — ASSESSMENT & PLAN NOTE
Patient restarted on Metformin 12/2020 for PCOS.  Previous blood sugars normal.  Patient with scheduled follow-up with gynecologist 7/20/2021.  Patient previously had testosterone and FSH/LH checked and recommended patient continue following with GYN..  Patient continues to feel like hormones are abnormal.

## 2021-07-06 NOTE — ASSESSMENT & PLAN NOTE
Patient states migraines with aura.  Patient previously following with neurology, last visit 3/2020.  Patient states she has approximately 2 migraines per month which is a decrease from previous.  Patient continues to take magnesium 500 mg daily, B2 400 mg daily, coenzyme Q 10 300 mg daily.  Patient requesting refill of ibuprofen 800 for rescue.  Discussed GI side effects of ibuprofen and encourage patient to try and take this as needed.

## 2021-07-06 NOTE — PROGRESS NOTES
Virtual Visit: Established Patient   This visit was conducted via Zoom using secure and encrypted videoconferencing technology. The patient was in a private location in the state of [place].    The patient's identity was confirmed and verbal consent was obtained for this virtual visit.    Subjective:   CC:   Chief Complaint   Patient presents with   • Medication Refill     xanax refill       Felisha Pedraza is a 40 y.o. female presenting for evaluation and management of:    PCOS (polycystic ovarian syndrome)  Patient restarted on Metformin 12/2020 for PCOS.  Previous blood sugars normal.  Patient with scheduled follow-up with gynecologist 7/20/2021.  Patient previously had testosterone and FSH/LH checked and recommended patient continue following with GYN..  Patient continues to feel like hormones are abnormal.    KEVIN (generalized anxiety disorder)  Patient with history of anxiety and OCD for which she has been taking Xanax as needed.  Patient states she mostly uses the medication at night when she wakes up and is unable to return back to sleep.  PDMP shows last refill 3/15/2021.  Patient states she typically uses about 10 tabs per month for severe anxiety.  Patient states that she is using this medication appropriately and not with alcohol or other medications that could interact.  Patient previously prescribed Zoloft but continues to not want to take a daily medication.  Discussed with patient the benefit of Zoloft over Xanax but patient is not interested at this time.  Patient states she recently started with a counselor 2 weeks ago and has only had one visit.  Patient is advised to continue doing counseling.    Migraine with aura and without status migrainosus, not intractable  Patient states migraines with aura.  Patient previously following with neurology, last visit 3/2020.  Patient states she has approximately 2 migraines per month which is a decrease from previous.  Patient continues to take magnesium  500 mg daily, B2 400 mg daily, coenzyme Q 10 300 mg daily.  Patient requesting refill of ibuprofen 800 for rescue.  Discussed GI side effects of ibuprofen and encourage patient to try and take this as needed.    Chronic use of benzo for therapeutic purpose  No aberrant or addictive use of medications has been observed.  No adverse events have been reported. Patient counseled to keep medications locked up or under personal control.  [facility] board of pharmacy interface is reviewed.  No inconsistencies are found.  This is within [facility] guideline for anxiety prescribing by primary care.    ROS   Denies any recent fevers or chills. No nausea or vomiting. No chest pains or shortness of breath.     No Known Allergies    Current medicines (including changes today)  Current Outpatient Medications   Medication Sig Dispense Refill   • Bimatoprost 0.03 % Solution USE APPLICATION TO APPLY TO EYELASH BASE ONCE A DAY, MAKES LASHES GROW LONGER     • Clindamycin-Benzoyl Per, Refr, 1.2-5 % Gel APPLY TO ACNE ONCE A DAY IN THE MORNING, TOPICAL ANTIBIOTIC     • tretinoin (RETIN-A) 0.025 % cream APPLY A PEA SIZED AMOUNT TO AFFECTED AREA(S) AT BEDTIME AS DIRECTED     • ALPRAZolam (XANAX) 0.5 MG Tab Take 1 tablet by mouth at bedtime as needed for Sleep or Anxiety for up to 90 days. 30 tablet 2   • ibuprofen (MOTRIN) 800 MG Tab Take 1 tablet by mouth every 8 hours as needed for Moderate Pain or Headache. 30 tablet 1   • metFORMIN ER (GLUCOPHAGE XR) 500 MG TABLET SR 24 HR Take 1 Tab by mouth every day. 90 Tab 3   • doxylamine (UNISOM SLEEPTABS) 25 MG Tab tablet Take 25 mg by mouth every bedtime.     • MAGNESIUM PO Take  by mouth.     • Cyanocobalamin (B-12 PO) Take  by mouth.     • Coenzyme Q10 (COQ-10 PO) Take  by mouth.     • Nutritional Supplements (DHEA PO) Take  by mouth.     • Multiple Vitamins-Minerals (HAIR SKIN AND NAILS FORMULA PO) Take  by mouth.     • VITAMIN D PO Take  by mouth.       No current facility-administered medications for  this visit.       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  She  has a past surgical history that includes other orthopedic surgery (2011); pelviscopy (4/3/2013); cyst excision (4/3/2013); and repeat c section (9/2/2017).       Objective:   Wt 48.5 kg (107 lb)   BMI 20.22 kg/m²     Physical Exam:  Constitutional: Alert, no distress, well-groomed.  Skin: No rashes in visible areas.  Eye: Round. Conjunctiva clear, lids normal. No icterus.   ENMT: Lips pink without lesions, good dentition, moist mucous membranes. Phonation normal.  Neck: No masses, no thyromegaly. Moves freely without pain.  Respiratory: Unlabored respiratory effort, no cough or audible wheeze  Psych: Alert and oriented x3, normal affect and mood.       Assessment and Plan:   The following treatment plan was discussed:     1. KEVIN (generalized anxiety disorder)  Chronic, stable.  I refilled the patient's medications and recommended patient continue only using this medication as needed.  Discussed with patient the importance of not using this medication with alcohol or while operating heavy machinery.  Discussed with patient that we would like to see her decrease her dose.  Patient has started following with counseling and will continue to do so.  Advised patient should consider starting on Zoloft.  Patient is not interested at this time.  Continue to follow.  -  ALPRAZolam (XANAX) 0.5 MG Tab; Take 1 tablet by mouth at bedtime as needed for Sleep or Anxiety for up to 90 days.  Dispense: 30 tablet; Refill: 2    2. PCOS (polycystic ovarian syndrome)  Chronic, stable.  Patient started on Metformin in December.  Recommended patient follow-up with lab work.  Patient has appointment with GYN at the end of the month.  Encourage patient to continue discussing her symptoms and concerns about hormones with GYN provider.  - TSH; Future  - FREE THYROXINE; Future    3. Migraine with aura and without status migrainosus, not intractable  Chronic, stable.  Patient previously following with neurology.  Patient not seen by neurology since 3/2020.  Patient states supplements have greatly improved migraines and they only occur approximately 2 times per month.  Patient requesting ibuprofen for rescue medication.  Discussed with patient side effects of ibuprofen and encourage her to only use it as needed.  - ibuprofen (MOTRIN) 800 MG Tab; Take 1 tablet by mouth every 8 hours as needed for Moderate Pain or Headache.  Dispense: 30 tablet; Refill: 1    Follow-up: Return in about 6 months (around 1/6/2022).

## 2021-07-06 NOTE — ASSESSMENT & PLAN NOTE
Patient with history of anxiety and OCD for which she has been taking Xanax as needed.  Patient states she mostly uses the medication at night when she wakes up and is unable to return back to sleep.  PDMP shows last refill 3/15/2021.  Patient states she typically uses about 10 tabs per month for severe anxiety.  Patient states that she is using this medication appropriately and not with alcohol or other medications that could interact.  Patient previously prescribed Zoloft but continues to not want to take a daily medication.  Discussed with patient the benefit of Zoloft over Xanax but patient is not interested at this time.  Patient states she recently started with a counselor 2 weeks ago and has only had one visit.  Patient is advised to continue doing counseling.

## 2021-07-13 ENCOUNTER — HOSPITAL ENCOUNTER (OUTPATIENT)
Dept: LAB | Facility: MEDICAL CENTER | Age: 41
End: 2021-07-13
Attending: STUDENT IN AN ORGANIZED HEALTH CARE EDUCATION/TRAINING PROGRAM
Payer: COMMERCIAL

## 2021-07-13 ENCOUNTER — HOSPITAL ENCOUNTER (OUTPATIENT)
Dept: LAB | Facility: MEDICAL CENTER | Age: 41
End: 2021-07-13
Attending: NURSE PRACTITIONER
Payer: COMMERCIAL

## 2021-07-13 DIAGNOSIS — E28.2 PCOS (POLYCYSTIC OVARIAN SYNDROME): ICD-10-CM

## 2021-07-13 DIAGNOSIS — Z00.00 ROUTINE GENERAL MEDICAL EXAMINATION AT A HEALTH CARE FACILITY: ICD-10-CM

## 2021-07-13 LAB
25(OH)D3 SERPL-MCNC: 81 NG/ML (ref 30–100)
ALBUMIN SERPL BCP-MCNC: 4.4 G/DL (ref 3.2–4.9)
ALBUMIN/GLOB SERPL: 1.8 G/DL
ALP SERPL-CCNC: 57 U/L (ref 30–99)
ALT SERPL-CCNC: 13 U/L (ref 2–50)
ANION GAP SERPL CALC-SCNC: 9 MMOL/L (ref 7–16)
AST SERPL-CCNC: 18 U/L (ref 12–45)
BILIRUB SERPL-MCNC: 0.4 MG/DL (ref 0.1–1.5)
BUN SERPL-MCNC: 12 MG/DL (ref 8–22)
CALCIUM SERPL-MCNC: 9.6 MG/DL (ref 8.5–10.5)
CHLORIDE SERPL-SCNC: 106 MMOL/L (ref 96–112)
CO2 SERPL-SCNC: 27 MMOL/L (ref 20–33)
CREAT SERPL-MCNC: 0.67 MG/DL (ref 0.5–1.4)
EST. AVERAGE GLUCOSE BLD GHB EST-MCNC: 91 MG/DL
GLOBULIN SER CALC-MCNC: 2.5 G/DL (ref 1.9–3.5)
GLUCOSE SERPL-MCNC: 100 MG/DL (ref 65–99)
HBA1C MFR BLD: 4.8 % (ref 4–5.6)
POTASSIUM SERPL-SCNC: 4 MMOL/L (ref 3.6–5.5)
PROT SERPL-MCNC: 6.9 G/DL (ref 6–8.2)
SODIUM SERPL-SCNC: 142 MMOL/L (ref 135–145)
T4 FREE SERPL-MCNC: 1.11 NG/DL (ref 0.93–1.7)
TSH SERPL DL<=0.005 MIU/L-ACNC: 0.73 UIU/ML (ref 0.38–5.33)

## 2021-07-13 PROCEDURE — 83036 HEMOGLOBIN GLYCOSYLATED A1C: CPT

## 2021-07-13 PROCEDURE — 36415 COLL VENOUS BLD VENIPUNCTURE: CPT

## 2021-07-13 PROCEDURE — 82306 VITAMIN D 25 HYDROXY: CPT

## 2021-07-13 PROCEDURE — 80053 COMPREHEN METABOLIC PANEL: CPT

## 2021-07-13 PROCEDURE — 84443 ASSAY THYROID STIM HORMONE: CPT

## 2021-07-13 PROCEDURE — 84439 ASSAY OF FREE THYROXINE: CPT

## 2021-07-14 ENCOUNTER — TELEMEDICINE (OUTPATIENT)
Dept: BEHAVIORAL HEALTH | Facility: CLINIC | Age: 41
End: 2021-07-14
Payer: COMMERCIAL

## 2021-07-14 DIAGNOSIS — F60.5 OBSESSIVE COMPULSIVE PERSONALITY DISORDER (HCC): ICD-10-CM

## 2021-07-14 DIAGNOSIS — F41.1 GENERALIZED ANXIETY DISORDER: ICD-10-CM

## 2021-07-14 PROCEDURE — 90837 PSYTX W PT 60 MINUTES: CPT | Mod: 95 | Performed by: SOCIAL WORKER

## 2021-07-14 NOTE — PROGRESS NOTES
[facility]   Therapy Progress Note    This visit was conducted via Zoom using secure and encrypted videoconferencing technology.  The patient was in a private location in the state of [place].  The patient's identity was confirmed and verbal consent was obtained for this virtual visit.    Name: Felisha Pedraza  MRN: [id number]  : 1980  Age: 40 y.o.  Date of assessment: 2021  PCP: VIDAL Najera.[email or web address].  Persons in attendance: Patient  Total session time: 60 minutes    Topics addressed in psychotherapy include: Pt to video appt. 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 She experienced break when kids went to gp for 4 days, date night and met friends. Clinical focus on use of psychotropics for anxety and depression instead of alcohol use. Provided psychoedu on SSRI anxiety/OCD. Pt also reports  noticing stress level with children and in home, grinding teeth and needing dental work also due to stress level. Goal setting with looking at long tern career change. Pt agreed to restart Zoloft, get psy appt prior to school yr initiation.  Plan: See bi weekly.     Objective Observations:   Participation:Active verbal participation, Engaged and Open to feedback   Grooming:Casual   Cognition:Fully Oriented   Eye Contact:Good   Mood:tense   Affect:Tearful   Thought Process:Logical   Speech:Rate within normal limits and Volume within normal limits    Current Risk:   Suicide: NA   Homicide: NA   Self-Harm: NA   Relapse: NA   Safety Plan Reviewed: NA    Care Plan Updated: Yes    Does patient express agreement with the above plan? Yes     Diagnosis:  No diagnosis found.    Therapeutic Intervention(s): Cognitive modification, Distress tolerance skills, Goal-setting and Interpersonal effectiveness skills    Treatment Goal(s)/Objective(s) addressed: medication administration, working on reducing OCD thoughts/behaviors     Progress toward Treatment Goals: No  change    Referral appointment(s) scheduled? No       Shasha Toro L.C.S.W.

## 2021-07-20 ENCOUNTER — HOSPITAL ENCOUNTER (OUTPATIENT)
Facility: MEDICAL CENTER | Age: 41
End: 2021-07-20
Attending: OBSTETRICS & GYNECOLOGY
Payer: COMMERCIAL

## 2021-07-20 ENCOUNTER — HOSPITAL ENCOUNTER (OUTPATIENT)
Dept: LAB | Facility: MEDICAL CENTER | Age: 41
End: 2021-07-20
Attending: OBSTETRICS & GYNECOLOGY
Payer: COMMERCIAL

## 2021-07-20 ENCOUNTER — GYNECOLOGY VISIT (OUTPATIENT)
Dept: OBGYN | Facility: CLINIC | Age: 41
End: 2021-07-20
Payer: COMMERCIAL

## 2021-07-20 VITALS
WEIGHT: 109 LBS | SYSTOLIC BLOOD PRESSURE: 105 MMHG | BODY MASS INDEX: 20.58 KG/M2 | DIASTOLIC BLOOD PRESSURE: 69 MMHG | HEIGHT: 61 IN

## 2021-07-20 DIAGNOSIS — Z01.419 ENCNTR FOR GYN EXAM (GENERAL) (ROUTINE) W/O ABN FINDINGS: ICD-10-CM

## 2021-07-20 DIAGNOSIS — E28.2 PCOS (POLYCYSTIC OVARIAN SYNDROME): ICD-10-CM

## 2021-07-20 DIAGNOSIS — Z12.4 CERVICAL CANCER SCREENING: ICD-10-CM

## 2021-07-20 DIAGNOSIS — B00.9 HSV INFECTION: ICD-10-CM

## 2021-07-20 DIAGNOSIS — L65.9 HAIR LOSS DISORDER: ICD-10-CM

## 2021-07-20 PROCEDURE — 83002 ASSAY OF GONADOTROPIN (LH): CPT

## 2021-07-20 PROCEDURE — 36415 COLL VENOUS BLD VENIPUNCTURE: CPT

## 2021-07-20 PROCEDURE — 87624 HPV HI-RISK TYP POOLED RSLT: CPT

## 2021-07-20 PROCEDURE — 88175 CYTOPATH C/V AUTO FLUID REDO: CPT

## 2021-07-20 PROCEDURE — 83001 ASSAY OF GONADOTROPIN (FSH): CPT

## 2021-07-20 PROCEDURE — 99395 PREV VISIT EST AGE 18-39: CPT | Performed by: OBSTETRICS & GYNECOLOGY

## 2021-07-20 RX ORDER — ACYCLOVIR 400 MG/1
400 TABLET ORAL 3 TIMES DAILY
Qty: 30 TABLET | Refills: 5 | Status: SHIPPED | OUTPATIENT
Start: 2021-07-20 | End: 2021-07-25

## 2021-07-20 ASSESSMENT — FIBROSIS 4 INDEX: FIB4 SCORE: 0.9

## 2021-07-20 NOTE — NON-PROVIDER
Pt here for Annual  LMP: 6/23/21  Last pap: 11/21/2019 WNL  Phone: [phone]  Pharmacy verified

## 2021-07-20 NOTE — PROGRESS NOTES
"Subjective:      Felisha Pedraza is a 40 y.o. female who presents for Gynecologic Exam (Annual)            HPI patient is a 40-year-old  who presents today for annual gynecologic exam.  Patient has no gynecologic complaints but is concerned about hair loss.  She states she has had hair loss with both pregnancies but her hair regrew with her first pregnancy but that has not happened with her last pregnancy.  She started Rogaine about a month ago.  Patient states that she went to dermatology and on she was told was to use Rogaine.  Patient states she was using OCP previously for birth control but did not want to use hormones anymore and now she has been using ovulation prediction/calendar method for contraception.    She states she is getting monthly menstrual bleeding.  States she has history of PCOS and would like me to review her recent lab work.    Last Pap smear was 2 years ago and desires Pap smear today.    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 Exam conducted with a chaperone present.   Constitutional:       General: She is not in acute distress.     Appearance: Normal appearance. She is normal weight. She is not toxic-appearing.   HENT:      Head: Normocephalic and atraumatic.   Eyes:      General: No scleral icterus.        Right eye: No discharge.         Left eye: No discharge.      Conjunctiva/sclera: Conjunctivae normal.   Cardiovascular:      Rate and Rhythm: Normal rate and regular rhythm.      Pulses: Normal pulses.      Heart sounds: Normal heart sounds. No murmur heard.   No " gallop.    Pulmonary:      Effort: Pulmonary effort is normal. No respiratory distress.      Breath sounds: Normal breath sounds. No wheezing.   Chest:      Breasts:         Right: No swelling, bleeding, inverted nipple, mass, nipple discharge, skin change or tenderness.         Left: No swelling, bleeding, inverted nipple, mass, nipple discharge, skin change or tenderness.   Abdominal:      General: Abdomen is flat. Bowel sounds are normal. There is no distension.      Palpations: Abdomen is soft.      Tenderness: There is no abdominal tenderness.   Genitourinary:     General: Normal vulva.      Labia:         Right: No rash, tenderness, lesion or injury.         Left: No rash, tenderness, lesion or injury.       Urethra: No prolapse.      Vagina: No vaginal discharge, tenderness or bleeding.      Cervix: No cervical motion tenderness, friability or erythema.      Uterus: Not enlarged and not tender.       Adnexa:         Right: No mass, tenderness or fullness.          Left: No mass, tenderness or fullness.        Rectum: No external hemorrhoid.   Musculoskeletal:         General: Normal range of motion.      Cervical back: Normal range of motion and neck supple. No rigidity.      Right lower leg: No edema.      Left lower leg: No edema.   Lymphadenopathy:      Cervical: No cervical adenopathy.      Upper Body:      Right upper body: No supraclavicular or axillary adenopathy.      Left upper body: No supraclavicular or axillary adenopathy.      Lower Body: No right inguinal adenopathy. No left inguinal adenopathy.   Skin:     General: Skin is warm and dry.      Coloration: Skin is not jaundiced.      Findings: No bruising.   Neurological:      General: No focal deficit present.      Mental Status: She is alert and oriented to person, place, and time.      Motor: No weakness.      Gait: Gait normal.   Psychiatric:         Mood and Affect: Mood normal.         Behavior: Behavior normal.         Thought Content:  Thought content normal.         Judgment: Judgment normal.                        Assessment/Plan:        1. Encntr for gyn exam (general) (routine) w/o abn findings  40-year-old  here for annual gynecologic exam.  Exam is within normal limits  Labs are reviewed including recent TSH testing which were normal range.  Safe sex practices discussed  Diet exercise reviewed  Breast cancer screening recommendations reviewed    - FSH/LH; Future    2. Hair loss disorder  I counseled patient on hair loss and possible etiology.  Patient just started Rogaine I discussed that it would take many months for her to see results.  She will follow-up with dermatology if needed  - FSH/LH; Future    3. PCOS (polycystic ovarian syndrome)  I discussed PCOS and treatment options.  States she was taking DHEA.    4. HSV infection  Reports history of HSV and desires refill of acyclovir for outbreak.  Patient counseled on medication  - acyclovir (ZOVIRAX) 400 MG tablet; Take 1 tablet by mouth 3 times a day for 5 days.  Dispense: 30 tablet; Refill: 5

## 2021-07-21 LAB
CYTOLOGY REG CYTOL: NORMAL
FSH SERPL-ACNC: 7.1 MIU/ML
HPV HR 12 DNA CVX QL NAA+PROBE: NEGATIVE
HPV16 DNA SPEC QL NAA+PROBE: NEGATIVE
HPV18 DNA SPEC QL NAA+PROBE: NEGATIVE
LH SERPL-ACNC: 28.2 IU/L
SPECIMEN SOURCE: NORMAL

## 2021-07-27 ENCOUNTER — TELEMEDICINE (OUTPATIENT)
Dept: BEHAVIORAL HEALTH | Facility: CLINIC | Age: 41
End: 2021-07-27
Payer: COMMERCIAL

## 2021-07-27 DIAGNOSIS — F41.1 GENERALIZED ANXIETY DISORDER: ICD-10-CM

## 2021-07-27 PROCEDURE — 90792 PSYCH DIAG EVAL W/MED SRVCS: CPT | Mod: 95 | Performed by: SOCIAL WORKER

## 2021-07-27 NOTE — PROGRESS NOTES
[facility]   Therapy Progress Note    This visit was conducted via Zoom using secure and encrypted videoconferencing technology.  The patient was in a private location in the state of [place].  The patient's identity was confirmed and verbal consent was obtained for this virtual visit.    Name: Felisha Pedraza  MRN: [id number]  : 1980  Age: 40 y.o.  Date of assessment: 2021  PCP: VIDAL Najera.PJaneyNJanye.  Persons in attendance: Patient  Total session time:  minutes    Topics addressed in psychotherapy include: Pt to video indiv appt. Pt taking Zoloft for 2 wks, with no response; pt is noticing increased insomnia and taking 1/4 xanax, some alcohol use. Will confer with Dr. Gerard with anxiety around changes in schooling for youngest dtr to pre-k at pt school (she is more difficult child); husb working 6 days. Processed changes, political climate at school, relationship with husb and expectations, understanding beliefs, customs. Pt has little in way of distress tolerance build in other than relaxing girls go with gp overnight on 1 weekend night/month.  Monitor for mood improvements. Plan: See biweekly.     Objective Observations:   Participation:Active verbal participation, Engaged and Open to feedback   Grooming:Casual   Cognition:Fully Oriented   Eye Contact:Good   Mood:Anxious and tense   Affect:Constricted   Thought Process:Logical   Speech:Rate within normal limits and Volume within normal limits    Current Risk:   Suicide: NA, No SI   Homicide: NA   Self-Harm: NA   Relapse: NA   Safety Plan Reviewed: no    Care Plan Updated: No    Does patient express agreement with the above plan? Yes     Diagnosis:  1. OCD  2. KEVIN    Therapeutic Intervention(s): Cognitive modification, Distress tolerance skills, Goal-setting and Interpersonal effectiveness skills    Treatment Goal(s)/Objective(s) addressed: problem solving, upcoming changes to schedule/family routines     Progress toward Treatment Goals:  Mild improvement, pt agreed to and followed with medication recommnedation    Referral appointment(s) scheduled? Clara Toro L.C.S.W.

## 2021-07-28 ENCOUNTER — TELEMEDICINE (OUTPATIENT)
Dept: BEHAVIORAL HEALTH | Facility: CLINIC | Age: 41
End: 2021-07-28
Payer: COMMERCIAL

## 2021-07-28 VITALS — BODY MASS INDEX: 20.6 KG/M2 | HEIGHT: 61 IN

## 2021-07-28 DIAGNOSIS — F60.5 OBSESSIVE COMPULSIVE PERSONALITY DISORDER (HCC): ICD-10-CM

## 2021-07-28 DIAGNOSIS — F41.1 GENERALIZED ANXIETY DISORDER: ICD-10-CM

## 2021-07-28 DIAGNOSIS — F41.9 INSOMNIA SECONDARY TO ANXIETY: ICD-10-CM

## 2021-07-28 DIAGNOSIS — F51.05 INSOMNIA SECONDARY TO ANXIETY: ICD-10-CM

## 2021-07-28 PROCEDURE — 99214 OFFICE O/P EST MOD 30 MIN: CPT | Mod: 95 | Performed by: PSYCHIATRY & NEUROLOGY

## 2021-07-28 NOTE — PROGRESS NOTES
PSYCHIATRY VIRTUAL VISIT FOLLOW-UP NOTE      Chief Complaint   Patient presents with   • Follow-Up     anxiety       This evaluation was conducted via Zoom using secure and encrypted videoconferencing technology. The patient was in a private location in the [facility].    The patient's identity was confirmed and verbal consent was obtained for this virtual visit.    History Of Present Illness:  Felisha Pedraza is a 40 y.o. female with generalized anxiety disorder, obsessive compulsive personality disorder, migraines, PCOS comes in today for follow up, was last seen over 9 months ago. She is doing the same in regards to her anxiety. She took Zoloft for a couple of days when I prescribed it to her at her initial appointment but stopped taking it as she felt little bit more anxious. She just started therapy and was encouraged by her therapist to try the medication again. She has been taking Zoloft for the last couple of weeks and is taking 25 mg at bedtime. She noticed some nausea as a side effect which has now resolved. She has also noticed some insomnia but is taking over-the-counter Unisom and Xanax as needed with benefit. 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 She is feeling anxious about going back to work next week. She mentions that her youngest daughter will be starting  at the same school which is also causing some anxiety as she can be difficult at times. She is struggling with low motivation and at times has periods of sadness. She denies having thoughts of wanting to hurt herself.    Social History:   She is , 2 daughters, employed full-time as , lives with family in [place].    Substance Use:  Alcohol - Continue to have 1-2 alcoholic drinks daily  Nicotine - Denies  Cannabis - Denies  Illicit drugs - Denies    Past Medication Trials:  None     Medications:  Current  "Outpatient Medications   Medication Sig Dispense Refill   • Bimatoprost 0.03 % Solution USE APPLICATION TO APPLY TO EYELASH BASE ONCE A DAY, MAKES LASHES GROW LONGER     • Clindamycin-Benzoyl Per, Refr, 1.2-5 % Gel APPLY TO ACNE ONCE A DAY IN THE MORNING, TOPICAL ANTIBIOTIC     • tretinoin (RETIN-A) 0.025 % cream APPLY A PEA SIZED AMOUNT TO AFFECTED AREA(S) AT BEDTIME AS DIRECTED     • ALPRAZolam (XANAX) 0.5 MG Tab Take 1 tablet by mouth at bedtime as needed for Sleep or Anxiety for up to 90 days. 30 tablet 2   • ibuprofen (MOTRIN) 800 MG Tab Take 1 tablet by mouth every 8 hours as needed for Moderate Pain or Headache. 30 tablet 1   • metFORMIN ER (GLUCOPHAGE XR) 500 MG TABLET SR 24 HR Take 1 Tab by mouth every day. 90 Tab 3   • doxylamine (UNISOM SLEEPTABS) 25 MG Tab tablet Take 25 mg by mouth every bedtime.     • MAGNESIUM PO Take  by mouth.     • Cyanocobalamin (B-12 PO) Take  by mouth.     • Coenzyme Q10 (COQ-10 PO) Take  by mouth.     • Nutritional Supplements (DHEA PO) Take  by mouth.     • VITAMIN D PO Take  by mouth.       No current facility-administered medications for this visit.       Review Of Systems:    Constitutional - Negative for fatigue  Psychiatric - Positive for anxiety, poor sleep    Physical Examination:  Vital signs: Ht 1.549 m (5' 1\")   BMI 20.60 kg/m²     Musculoskeletal: No abnormal movements.     Mental Status Evaluation:   General: Young white female, dressed in casual attire, good grooming and hygiene, in no apparent distress, calm and cooperative, good eye contact, no psychomotor agitation or retardation  Orientation: Alert and oriented to person, place and time  Recent and remote memory: Grossly intact  Attention span and concentration: Grossly intact  Speech: Spontaneous, normal rate, rhythm and tone  Thought Process: Linear, logical and goal directed  Thought Content: Denies suicidal or homicidal ideations, intent or plan  Perception: Denies auditory or visual hallucinations. No " "delusions noted  Associations: Intact  Language: Appropriate  Fund of knowledge and vocabulary: Grossly adequate  Mood: \"alright\"  Affect: Anxious, mood congruent  Insight: Good  Judgment: Good    Depression screening:  Depression Screen (PHQ-2/PHQ-9) 4/2/2019 3/17/2020 7/6/2021   PHQ-2 Total Score - - -   PHQ-2 Total Score 0 0 0   PHQ-9 Total Score - - -     Interpretation of PHQ-9 Total Score   Score Severity   1-4 No Depression   5-9 Mild Depression   10-14 Moderate Depression   15-19 Moderately Severe Depression   20-27 Severe Depression    Anxiety screening:  KEVIN 7 6/23/2021   KEVIN-7 Total Score 11     Interpretation of KEVIN 7 Total Score   Score Severity:  0-4 No Anxiety   5-9 Mild Anxiety  10-14 Moderate Anxiety  15-21 Severe Anxiety    Medical Records/Labs/Diagnostic Tests Reviewed:  [place] PDMP records - appropriate refills, no abuse suspected       Impression:  1.  Generalized anxiety disorder - stable  2.  Obsessive-compulsive personality disorder - stable  3.  Insomnia secondary to anxiety - stable    Plan:  1.  Increase Zoloft to 50 mg at bedtime for anxiety. Discussed 4 to 6-week period to assess efficacy from Zoloft and often needing higher doses to treat anxiety symptoms.  2.  Continue Xanax 0.5 mg at bedtime as needed for anxiety/sleep, prescribed by PCP.  3.  Continue OTC Unisom at bedtime for sleep.  4. Continue individual psychotherapy with KIMBERLY EspinosaW, MSW.    Return to clinic in 4 weeks or sooner if symptoms worsen    The proposed treatment plan was discussed with the patient who was provided the opportunity to ask questions and make suggestions regarding alternative treatment. Patient verbalized understanding and expressed agreement with the plan.     Meryl Patel M.D.  07/28/21    This note was created using voice recognition software (Dragon). The accuracy of the dictation is limited by the abilities of the software. I have reviewed the note prior to signing, however some errors in " grammar and context are still possible. If you have any questions related to this note please do not hesitate to contact our office.

## 2021-08-09 DIAGNOSIS — L65.9 HAIR LOSS: ICD-10-CM

## 2021-08-23 ENCOUNTER — HOSPITAL ENCOUNTER (OUTPATIENT)
Dept: LAB | Facility: MEDICAL CENTER | Age: 41
End: 2021-08-23
Attending: OBSTETRICS & GYNECOLOGY
Payer: COMMERCIAL

## 2021-08-23 DIAGNOSIS — L65.9 HAIR LOSS: ICD-10-CM

## 2021-08-23 PROCEDURE — 84270 ASSAY OF SEX HORMONE GLOBUL: CPT

## 2021-08-23 PROCEDURE — 84402 ASSAY OF FREE TESTOSTERONE: CPT

## 2021-08-23 PROCEDURE — 36415 COLL VENOUS BLD VENIPUNCTURE: CPT

## 2021-08-23 PROCEDURE — 84403 ASSAY OF TOTAL TESTOSTERONE: CPT

## 2021-08-26 ENCOUNTER — APPOINTMENT (OUTPATIENT)
Dept: BEHAVIORAL HEALTH | Facility: CLINIC | Age: 41
End: 2021-08-26
Payer: COMMERCIAL

## 2021-08-27 LAB
SHBG SERPL-SCNC: 57 NMOL/L (ref 30–135)
TESTOST FREE SERPL-MCNC: 24.1 PG/ML (ref 1.3–9.2)
TESTOST SERPL-MCNC: 194 NG/DL (ref 9–55)

## 2021-08-31 ENCOUNTER — TELEPHONE (OUTPATIENT)
Dept: OBGYN | Facility: CLINIC | Age: 41
End: 2021-08-31

## 2021-08-31 DIAGNOSIS — L67.8 ABNORMAL HAIR PATTERN: ICD-10-CM

## 2021-08-31 DIAGNOSIS — E28.2 PCOS (POLYCYSTIC OVARIAN SYNDROME): ICD-10-CM

## 2021-08-31 RX ORDER — NORGESTIMATE AND ETHINYL ESTRADIOL 0.25-0.035
1 KIT ORAL DAILY
Qty: 28 TABLET | Refills: 2 | Status: SHIPPED | OUTPATIENT
Start: 2021-08-31 | End: 2021-09-23 | Stop reason: SDUPTHER

## 2021-08-31 NOTE — TELEPHONE ENCOUNTER
Called Pt but N/A, LVMTCB. If Pt calls back, please verify if she is still wanting her recent labs to be fax over at [phone]. Please verify which specific labs she needs. Thank you.

## 2021-09-08 DIAGNOSIS — L65.9 HAIR LOSS: ICD-10-CM

## 2021-09-08 DIAGNOSIS — E28.2 PCOS (POLYCYSTIC OVARIAN SYNDROME): ICD-10-CM

## 2021-09-08 NOTE — PROGRESS NOTES
Patient sent message requesting the above labs:  Estrodiol, Lipid Panel, CBC, T.P.O. Thyroid Peroxidase, T3, Free.Lab orders were placed

## 2021-09-12 ENCOUNTER — PATIENT MESSAGE (OUTPATIENT)
Dept: MEDICAL GROUP | Facility: MEDICAL CENTER | Age: 41
End: 2021-09-12

## 2021-09-12 DIAGNOSIS — E28.2 PCOS (POLYCYSTIC OVARIAN SYNDROME): ICD-10-CM

## 2021-09-12 DIAGNOSIS — L65.9 HAIR LOSS: ICD-10-CM

## 2021-09-13 RX ORDER — SPIRONOLACTONE 25 MG/1
25 TABLET ORAL
Qty: 90 TABLET | Refills: 0 | Status: SHIPPED
Start: 2021-09-13 | End: 2021-12-20

## 2021-09-14 ENCOUNTER — HOSPITAL ENCOUNTER (OUTPATIENT)
Dept: LAB | Facility: MEDICAL CENTER | Age: 41
End: 2021-09-14
Attending: OBSTETRICS & GYNECOLOGY
Payer: COMMERCIAL

## 2021-09-14 DIAGNOSIS — L67.8 ABNORMAL HAIR PATTERN: ICD-10-CM

## 2021-09-14 DIAGNOSIS — E28.2 PCOS (POLYCYSTIC OVARIAN SYNDROME): ICD-10-CM

## 2021-09-14 LAB
BASOPHILS # BLD AUTO: 2.1 % (ref 0–1.8)
BASOPHILS # BLD: 0.1 K/UL (ref 0–0.12)
CHOLEST SERPL-MCNC: 168 MG/DL (ref 100–199)
EOSINOPHIL # BLD AUTO: 0.21 K/UL (ref 0–0.51)
EOSINOPHIL NFR BLD: 4.4 % (ref 0–6.9)
ERYTHROCYTE [DISTWIDTH] IN BLOOD BY AUTOMATED COUNT: 44.3 FL (ref 35.9–50)
ESTRADIOL SERPL-MCNC: 19 PG/ML
HCT VFR BLD AUTO: 42.3 % (ref 37–47)
HDLC SERPL-MCNC: 80 MG/DL
HGB BLD-MCNC: 13.9 G/DL (ref 12–16)
IMM GRANULOCYTES # BLD AUTO: 0.02 K/UL (ref 0–0.11)
IMM GRANULOCYTES NFR BLD AUTO: 0.4 % (ref 0–0.9)
LDLC SERPL CALC-MCNC: 80 MG/DL
LYMPHOCYTES # BLD AUTO: 1.11 K/UL (ref 1–4.8)
LYMPHOCYTES NFR BLD: 23.2 % (ref 22–41)
MCH RBC QN AUTO: 30.3 PG (ref 27–33)
MCHC RBC AUTO-ENTMCNC: 32.9 G/DL (ref 33.6–35)
MCV RBC AUTO: 92.2 FL (ref 81.4–97.8)
MONOCYTES # BLD AUTO: 0.42 K/UL (ref 0–0.85)
MONOCYTES NFR BLD AUTO: 8.8 % (ref 0–13.4)
NEUTROPHILS # BLD AUTO: 2.93 K/UL (ref 2–7.15)
NEUTROPHILS NFR BLD: 61.1 % (ref 44–72)
NRBC # BLD AUTO: 0 K/UL
NRBC BLD-RTO: 0 /100 WBC
PLATELET # BLD AUTO: 236 K/UL (ref 164–446)
PMV BLD AUTO: 13.4 FL (ref 9–12.9)
RBC # BLD AUTO: 4.59 M/UL (ref 4.2–5.4)
T4 FREE SERPL-MCNC: 1.15 NG/DL (ref 0.93–1.7)
TESTOST SERPL-MCNC: <10 NG/DL (ref 9–75)
THYROPEROXIDASE AB SERPL-ACNC: 17 IU/ML (ref 0–9)
TRIGL SERPL-MCNC: 40 MG/DL (ref 0–149)
TSH SERPL DL<=0.005 MIU/L-ACNC: 1.64 UIU/ML (ref 0.38–5.33)
WBC # BLD AUTO: 4.8 K/UL (ref 4.8–10.8)

## 2021-09-14 PROCEDURE — 84443 ASSAY THYROID STIM HORMONE: CPT

## 2021-09-14 PROCEDURE — 80061 LIPID PANEL: CPT

## 2021-09-14 PROCEDURE — 85025 COMPLETE CBC W/AUTO DIFF WBC: CPT

## 2021-09-14 PROCEDURE — 82670 ASSAY OF TOTAL ESTRADIOL: CPT

## 2021-09-14 PROCEDURE — 86376 MICROSOMAL ANTIBODY EACH: CPT

## 2021-09-14 PROCEDURE — 84439 ASSAY OF FREE THYROXINE: CPT

## 2021-09-14 PROCEDURE — 84403 ASSAY OF TOTAL TESTOSTERONE: CPT

## 2021-09-14 PROCEDURE — 36415 COLL VENOUS BLD VENIPUNCTURE: CPT

## 2021-09-16 DIAGNOSIS — Z13.29 THYROID DISORDER SCREEN: ICD-10-CM

## 2021-09-23 DIAGNOSIS — E28.2 PCOS (POLYCYSTIC OVARIAN SYNDROME): ICD-10-CM

## 2021-09-23 DIAGNOSIS — L67.8 ABNORMAL HAIR PATTERN: ICD-10-CM

## 2021-09-23 RX ORDER — NORGESTIMATE AND ETHINYL ESTRADIOL 0.25-0.035
1 KIT ORAL DAILY
Qty: 28 TABLET | Refills: 9 | Status: SHIPPED | OUTPATIENT
Start: 2021-09-23 | End: 2022-08-08 | Stop reason: SDUPTHER

## 2021-12-13 ENCOUNTER — PATIENT MESSAGE (OUTPATIENT)
Dept: MEDICAL GROUP | Facility: MEDICAL CENTER | Age: 41
End: 2021-12-13

## 2021-12-13 DIAGNOSIS — E28.2 PCOS (POLYCYSTIC OVARIAN SYNDROME): ICD-10-CM

## 2021-12-14 NOTE — TELEPHONE ENCOUNTER
From: Felisha Pedraza  To: Physician Assistant Huyen Guzman  Sent: 12/13/2021 9:13 PM PST  Subject: Metformin Refill    Would you be able to refill my Metformin prescription for my PCOS that Dr. Enamorado prescribed me, please?

## 2021-12-14 NOTE — PATIENT COMMUNICATION
Received request via: Patient    Was the patient seen in the last year in this department? Yes Patient advised that she needs to establish with a new provider    Does the patient have an active prescription (recently filled or refills available) for medication(s) requested? No     Requested Prescriptions     Pending Prescriptions Disp Refills   • metFORMIN ER (GLUCOPHAGE XR) 500 MG TABLET SR 24 HR 30 Tablet 0     Sig: Take 1 Tablet by mouth every day.

## 2021-12-15 RX ORDER — METFORMIN HYDROCHLORIDE 500 MG/1
500 TABLET, EXTENDED RELEASE ORAL DAILY
Qty: 30 TABLET | Refills: 0 | Status: SHIPPED | OUTPATIENT
Start: 2021-12-15 | End: 2022-01-17

## 2021-12-20 ENCOUNTER — PATIENT MESSAGE (OUTPATIENT)
Dept: MEDICAL GROUP | Facility: MEDICAL CENTER | Age: 41
End: 2021-12-20

## 2021-12-20 ENCOUNTER — TELEMEDICINE (OUTPATIENT)
Dept: MEDICAL GROUP | Facility: MEDICAL CENTER | Age: 41
End: 2021-12-20
Payer: COMMERCIAL

## 2021-12-20 ENCOUNTER — TELEMEDICINE (OUTPATIENT)
Dept: BEHAVIORAL HEALTH | Facility: CLINIC | Age: 41
End: 2021-12-20
Payer: COMMERCIAL

## 2021-12-20 VITALS — HEIGHT: 61 IN | BODY MASS INDEX: 20.2 KG/M2 | WEIGHT: 107 LBS

## 2021-12-20 DIAGNOSIS — E28.2 PCOS (POLYCYSTIC OVARIAN SYNDROME): ICD-10-CM

## 2021-12-20 DIAGNOSIS — M25.541 ARTHRALGIA OF BOTH HANDS: ICD-10-CM

## 2021-12-20 DIAGNOSIS — M25.542 ARTHRALGIA OF BOTH HANDS: ICD-10-CM

## 2021-12-20 DIAGNOSIS — F41.1 GENERALIZED ANXIETY DISORDER: ICD-10-CM

## 2021-12-20 DIAGNOSIS — F60.5 OBSESSIVE COMPULSIVE PERSONALITY DISORDER (HCC): ICD-10-CM

## 2021-12-20 PROCEDURE — 90837 PSYTX W PT 60 MINUTES: CPT | Mod: 95 | Performed by: SOCIAL WORKER

## 2021-12-20 PROCEDURE — 99213 OFFICE O/P EST LOW 20 MIN: CPT | Performed by: STUDENT IN AN ORGANIZED HEALTH CARE EDUCATION/TRAINING PROGRAM

## 2021-12-20 RX ORDER — ACYCLOVIR 400 MG/1
TABLET ORAL
COMMUNITY
Start: 2021-12-17 | End: 2022-03-15

## 2021-12-20 RX ORDER — ALPRAZOLAM 0.5 MG/1
0.5 TABLET ORAL PRN
Qty: 30 TABLET | Refills: 1 | Status: SHIPPED | OUTPATIENT
Start: 2021-12-20 | End: 2022-06-13 | Stop reason: SDUPTHER

## 2021-12-20 RX ORDER — VENLAFAXINE HYDROCHLORIDE 37.5 MG/1
37.5 CAPSULE, EXTENDED RELEASE ORAL DAILY
Qty: 30 CAPSULE | Refills: 3 | Status: SHIPPED | OUTPATIENT
Start: 2021-12-20 | End: 2022-04-04

## 2021-12-20 RX ORDER — ALPRAZOLAM 0.5 MG/1
TABLET ORAL
COMMUNITY
Start: 2021-12-17 | End: 2021-12-20 | Stop reason: SDUPTHER

## 2021-12-20 ASSESSMENT — FIBROSIS 4 INDEX: FIB4 SCORE: 0.82

## 2021-12-20 NOTE — PROGRESS NOTES
Virtual Visit: Established Patient   This visit was conducted via Zoom using secure and encrypted videoconferencing technology.   The patient was in a private location in the state of [place].    The patient's identity was confirmed and verbal consent was obtained for this virtual visit.    Subjective:   CC:   Chief Complaint   Patient presents with   • [facility]       Felisha Pedraza is a 41 y.o. female presenting to [facility] and for evaluation and management of:    PCOS  Patient taking oral contraceptive pill, Sprintec, and Metformin for control of PCOS.  Patient was found to have elevated testosterone levels and was restarted on birth control.  Patient would like to stop birth control but is concerned about her hair falling out and symptoms returning.  Patient requested spironolactone during this episode but states she did not take it.  Patient notes improvement in PCOS symptoms and no more hair loss.    Anxiety  Patient following with behavioral health for anxiety and OCD.  Patient using Xanax as needed for anxiety and states she mostly uses the medication at night when she is unable to sleep.  Patient using approximately 10 tablets/month.  Patient last filled 9/21. Patient previously prescribed Zoloft but declines starting it due to not wanting daily medication at her last visit.  Patient was then seen by behavioral health who again recommended Zoloft.  Patient started on Zoloft at the same time that her hair was falling out and stopped it due to fear of Zoloft was the cause.  Patient does note that when on Zoloft she had less anxiety and stress.  Thorough discussion about starting an SSRI but patient declines and would prefer to do further research.    Joint pain  Patient states swollen stiff fingers and toes bilaterally.  Patient notes that they are worse in the mornings.    Chronic use of benzo for therapeutic purpose  No aberrant or addictive use of medications has been observed.  No adverse  "events have been reported. Patient counseled to keep medications locked up or under personal control.  [facility] board of pharmacy interface is reviewed.  No inconsistencies are found.  This is within CDC guideline for anxiety prescribing by primary care.      ROS   See HPI     Objective:   Ht 1.549 m (5' 1\")   Wt 48.5 kg (107 lb)   BMI 20.22 kg/m²     Physical Exam:  Constitutional: Alert, no distress, well-groomed.  Skin: No rashes in visible areas.  Eye: Round. Conjunctiva clear, lids normal. No icterus.   ENMT: Lips pink without lesions, good dentition, moist mucous membranes. Phonation normal.  Neck: No masses, no thyromegaly. Moves freely without pain.  Respiratory: Unlabored respiratory effort, no cough or audible wheeze  Psych: Alert and oriented x3, normal affect and mood.     Assessment and Plan:   The following treatment plan was discussed:     1. Arthralgia of both hands  Chronic, stable.  Patient notes that she has had chronic swollen and stiff fingers and toes.  Patient requesting work-up for rheumatoid arthritis.  Discussed with patient that it is likely osteoarthritis and movement will continue to be beneficial.  Will check rheumatoid factor, ELISHA and CCP.  - RHEUMATOID ARTHRITIS FACTOR; Future  - ELISHA W/REFLEX IF POSITIVE  - CCP    2. Generalized anxiety disorder  Chronic, stable.   reviewed.  Patient continues to use Xanax as needed for anxiety.  Patient previously started on Zoloft.  Thorough discussion today about restarting on SSRI or SNRI but patient declines at this time.  - ALPRAZolam (XANAX) 0.5 MG Tab; Take 1 Tablet by mouth as needed for Sleep or Anxiety for up to 90 days.  Dispense: 30 Tablet; Refill: 1    3. PCOS (polycystic ovarian syndrome)  Chronic, stable.  Patient has improvement in PCOS symptoms on birth control and Metformin.  Patient was referred by GYN to endocrinology.  Patient has an appointment next month.    Follow-up: Return in about 6 months (around 6/20/2022).         "

## 2021-12-20 NOTE — PROGRESS NOTES
"[facility]   Therapy Progress Note    This visit was conducted via Zoom using secure and encrypted videoconferencing technology.  The patient was in a private location in the [facility].  The patient's identity was confirmed and verbal consent was obtained for this virtual visit.    Name: Felisha Pedraza  MRN: [id number]  : 1980  Age: 41 y.o.  Date of assessment: 2021  PCP: Huyen Guzman P.A.-C..  Persons in attendance: Patient  Total session time: 50 minutes    Topics addressed in psychotherapy include: Pt to video indiv appt. Pt reprots mood \"ok,\" affect constricted. PCP wants her to try Prozac, felt Lexapro was causing hair to fall out, OB put on BC due to high hormone level. Pt continues to feel overwhelmed with parenting 2 children, feeling she has to always \"be on.\" She and husb parent differently, some issues due to pt OCD and controlling personality. Provided parenting cogn behavior therapy ideas (contigency charts) for parening difficult children as well as encouraged coordinating with school for at home before and after school anxiety reducers. School year is going better than expected. Plan: Pt will f/u on spring break.     Objective Observations:   Participation:Active verbal participation, Engaged and Open to feedback   Grooming:Casual   Cognition:Fully Oriented   Eye Contact:Good   Mood:Euthymic   Affect:Flexible   Thought Process:Goal-directed   Speech:Rate within normal limits and Volume within normal limits    Current Risk:   Suicide: NA   Homicide: NA   Self-Harm: NA   Relapse: NA   Safety Plan Reviewed: NA    Care Plan Updated: No    Does patient express agreement with the above plan? Yes     Diagnosis:  1. KEVIN  2.  Obsessional thoughts and acts    Therapeutic Intervention(s): Cognitive modification, Interpersonal effectiveness skills, Problem-solving and Supportive psychotherapy    Treatment Goal(s)/Objective(s) addressed: anxiety/mood     Progress toward " Treatment Goals: Mild improvement    Referral appointment(s) scheduled? No       Shasha Toro L.C.S.W.

## 2022-01-05 ENCOUNTER — APPOINTMENT (OUTPATIENT)
Dept: ENDOCRINOLOGY | Facility: MEDICAL CENTER | Age: 42
End: 2022-01-05
Attending: NURSE PRACTITIONER
Payer: COMMERCIAL

## 2022-01-05 ENCOUNTER — OFFICE VISIT (OUTPATIENT)
Dept: ENDOCRINOLOGY | Facility: MEDICAL CENTER | Age: 42
End: 2022-01-05
Payer: COMMERCIAL

## 2022-01-05 VITALS
OXYGEN SATURATION: 98 % | RESPIRATION RATE: 16 BRPM | HEART RATE: 102 BPM | BODY MASS INDEX: 20.5 KG/M2 | SYSTOLIC BLOOD PRESSURE: 110 MMHG | HEIGHT: 61 IN | WEIGHT: 108.6 LBS | DIASTOLIC BLOOD PRESSURE: 70 MMHG

## 2022-01-05 DIAGNOSIS — E06.3 HASHIMOTO'S DISEASE: ICD-10-CM

## 2022-01-05 DIAGNOSIS — E28.2 POLYCYSTIC OVARIAN SYNDROME: ICD-10-CM

## 2022-01-05 DIAGNOSIS — E55.9 VITAMIN D DEFICIENCY: ICD-10-CM

## 2022-01-05 DIAGNOSIS — R79.89 HIGH SERUM TESTOSTERONE: ICD-10-CM

## 2022-01-05 PROCEDURE — 99214 OFFICE O/P EST MOD 30 MIN: CPT

## 2022-01-05 PROCEDURE — 99211 OFF/OP EST MAY X REQ PHY/QHP: CPT

## 2022-01-05 ASSESSMENT — FIBROSIS 4 INDEX: FIB4 SCORE: 0.82

## 2022-01-05 NOTE — PROGRESS NOTES
Chief Complaint: Consult requested by Huyen Guzman P.A.-C. for evaluation of Hypothyroidism/Hashimoto's     HPI:   1.  Hashimoto's disease:  Felisha Pedraza is a 41 y.o. female with history of Hashimoto's disease diagnosed on September 2021 and is here for initial evaluation.      She reports the following symptoms:fatigue, weight gain, constipation, swelling and feeling slow, palpitations which has been present for for the past  years.       She denies anxiousness, feeling excessive energy, tremulousness, sweating and weight loss.    She denies lumps or enlargement in the neck.    She reports a family history of Thyroid disease with her mother, grandmother.       She is currently not taking any thyroid replacement medication     Ref. Range 9/14/2021 07:34   Microsomal -Tpo- Abs Latest Ref Range: 0.0 - 9.0 IU/mL 17.0 (H)        Ref. Range 9/14/2021 07:34   TSH Latest Ref Range: 0.380 - 5.330 uIU/mL 1.640   Free T-4 Latest Ref Range: 0.93 - 1.70 ng/dL 1.15     2.  High serum testosterone:  3.  Polycystic ovarian syndrome:  She reports that  on September of last year she was losing a lot of hair.  she went to see her OB/GYN who evaluated hormone levels and her testosterone came back elevated  She was placed on birth control-Sprintec 0.25-35 MG-MCG and Metformin 500mg daily  Hair loss symptom have gotten better    4.  Vitamin D deficiency:  Currently taking at 1000 IUs OTC     Ref. Range 7/13/2021 15:10   25-Hydroxy   Vitamin D 25 Latest Ref Range: 30 - 100 ng/mL 81       Patient's medications, allergies, and social histories were reviewed and updated as appropriate.      ROS:     CONS:     No fever, no chills, weight gain, fatigue   EYES:      No diplopia, no blurry vision, no redness of eyes, no swelling of eyelids   ENT:    No hearing loss, No ear pain, No sore throat, no dysphagia, no neck swelling   CV:     No chest pain, no palpitations, no claudication, no orthopnea, no PND   PULM:    No SOB, no cough,  no hemoptysis, no wheezing    GI:   No nausea, no vomiting, no diarrhea, no constipation, no bloody stools   :  Passing urine well, no dysuria, no hematuria   ENDO:   No polyuria, no polydipsia, no heat intolerance, no cold intolerance   NEURO: No headaches, no dizziness, no convulsions, no tremors   MUSC:  swellings, no arthralgias, no myalgias, no weakness   SKIN:   No rash, no ulcers, no dry skin   PSYCH:   No depression, no anxiety, no difficulty sleeping, feeling slow       Past Medical History:  Patient Active Problem List    Diagnosis Date Noted   • Insomnia secondary to anxiety 07/28/2021   • 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 at SURGERY SAME DAY [facility]S   • CYST EXCISION  4/3/2013    Performed by Shin Mayo M.D. at SURGERY SAME DAY [facility] ORS   • OTHER ORTHOPEDIC SURGERY  2011    clavical right        Allergies:  Patient has no known allergies.     Current Medications:    Current Outpatient Medications:   •  acyclovir (ZOVIRAX) 400 MG tablet, , Disp: , Rfl:   •  ALPRAZolam (XANAX) 0.5 MG Tab, Take 1 Tablet by mouth as needed for Sleep or Anxiety for up to 90 days., Disp: 30 Tablet, Rfl: 1  •  venlafaxine XR (EFFEXOR XR) 37.5 MG CAPSULE SR 24 HR, Take 1 Capsule by mouth every day., Disp: 30 Capsule, Rfl: 3  •  metFORMIN ER (GLUCOPHAGE XR) 500 MG TABLET SR 24 HR, Take 1 Tablet by mouth every day., Disp: 30 Tablet, Rfl: 0  •  norgestimate-ethinyl estradiol (SPRINTEC 28) 0.25-35  MG-MCG per tablet, Take 1 Tablet by mouth every day., Disp: 28 Tablet, Rfl: 9  •  Bimatoprost 0.03 % Solution, USE APPLICATION TO APPLY TO EYELASH BASE ONCE A DAY, MAKES LASHES GROW LONGER, Disp: , Rfl:   •  tretinoin (RETIN-A) 0.025 % cream, APPLY A PEA SIZED AMOUNT TO AFFECTED AREA(S) AT BEDTIME AS DIRECTED, Disp: , Rfl:   •  ibuprofen (MOTRIN) 800 MG Tab, Take 1 tablet by mouth every 8 hours as needed for Moderate Pain or Headache., Disp: 30 tablet, Rfl: 1  •  doxylamine (UNISOM SLEEPTABS) 25 MG Tab tablet, Take 25 mg by mouth every bedtime., Disp: , Rfl:   •  MAGNESIUM PO, Take  by mouth., Disp: , Rfl:   •  Coenzyme Q10 (COQ-10 PO), Take  by mouth., Disp: , Rfl:   •  VITAMIN D PO, Take  by mouth., Disp: , Rfl:   •  Cyanocobalamin (B-12 PO), Take  by mouth. (Patient not taking: Reported on 1/5/2022), Disp: , Rfl:   •  Nutritional Supplements (DHEA PO), Take  by mouth. (Patient not taking: Reported on 1/5/2022), Disp: , Rfl:     Social History:  Social History     Socioeconomic History   • Marital status:      Spouse name: Not on file   • Number of children: Not on file   • Years of education: Not on file   • Highest education level: Not on file   Occupational History   • Not on file   Tobacco Use   • Smoking status: Never Smoker   • Smokeless tobacco: Never Used   Vaping Use   • Vaping Use: Never used   Substance and Sexual Activity   • Alcohol use: Yes     Comment: 1-2 drinks daily - wine or tequila    • Drug use: Not Currently     Types: Marijuana   • Sexual activity: Yes     Partners: Male     Birth control/protection: None   Other Topics Concern   • Not on file   Social History Narrative   • Not on file     Social Determinants of Health     Financial Resource Strain:    • Difficulty of Paying Living Expenses: Not on file   Food Insecurity:    • Worried About Running Out of Food in the Last Year: Not on file   • Ran Out of Food in the Last Year: Not on file   Transportation Needs:    • Lack of  "Transportation (Medical): Not on file   • Lack of Transportation (Non-Medical): Not on file   Physical Activity:    • Days of Exercise per Week: Not on file   • Minutes of Exercise per Session: Not on file   Stress:    • Feeling of Stress : Not on file   Social Connections:    • Frequency of Communication with Friends and Family: Not on file   • Frequency of Social Gatherings with Friends and Family: Not on file   • Attends Sikhism Services: Not on file   • Active Member of Clubs or Organizations: Not on file   • Attends Club or Organization Meetings: Not on file   • Marital Status: Not on file   Intimate Partner Violence:    • Fear of Current or Ex-Partner: Not on file   • Emotionally Abused: Not on file   • Physically Abused: Not on file   • Sexually Abused: Not on file   Housing Stability:    • Unable to Pay for Housing in the Last Year: Not on file   • Number of Places Lived in the Last Year: Not on file   • Unstable Housing in the Last Year: Not on file        Family History:   Family History   Problem Relation Age of Onset   • Thyroid Mother         hyperthyroidsim    • Cancer Father    • Hypertension Father          PHYSICAL EXAM:   Vital signs: /70 (BP Location: Left arm, Patient Position: Sitting, BP Cuff Size: Adult)   Pulse (!) 102   Resp 16   Ht 1.549 m (5' 1\")   Wt 49.3 kg (108 lb 9.6 oz)   SpO2 98%   BMI 20.52 kg/m²   GENERAL: Well-developed, well-nourished  in no apparent distress.   EYE: No ocular and eyelid asymmetry, Anicteric sclerae,  PERRL  HENT: Hearing grossly intact, Normocephalic, atraumatic.   NECK: Supple. Trachea midline. thyroid is normal in size without nodules or tenderness  CARDIOVASCULAR: Regular rate and rhythm. No murmurs, rubs, or gallops.   LUNGS: Clear to auscultation bilaterally    EXTREMITIES: No clubbing, cyanosis, or edema.   NEUROLOGICAL: Cranial nerves II-XII are grossly intact   Symmetric reflexes at the patella no proximal muscle weakness  LYMPH: No " cervical, supraclavicular,  adenopathy palpated.   SKIN: No rashes, lesions. Turgor is normal.    Labs:  Lab Results   Component Value Date/Time    WBC 4.8 09/14/2021 07:34 AM    RBC 4.59 09/14/2021 07:34 AM    HEMOGLOBIN 13.9 09/14/2021 07:34 AM    MCV 92.2 09/14/2021 07:34 AM    MCH 30.3 09/14/2021 07:34 AM    MCHC 32.9 (L) 09/14/2021 07:34 AM    RDW 44.3 09/14/2021 07:34 AM    MPV 13.4 (H) 09/14/2021 07:34 AM       Lab Results   Component Value Date/Time    SODIUM 142 07/13/2021 03:10 PM    POTASSIUM 4.0 07/13/2021 03:10 PM    CHLORIDE 106 07/13/2021 03:10 PM    CO2 27 07/13/2021 03:10 PM    ANION 9.0 07/13/2021 03:10 PM    GLUCOSE 100 (H) 07/13/2021 03:10 PM    BUN 12 07/13/2021 03:10 PM    CREATININE 0.67 07/13/2021 03:10 PM    CALCIUM 9.6 07/13/2021 03:10 PM    ASTSGOT 18 07/13/2021 03:10 PM    ALTSGPT 13 07/13/2021 03:10 PM    TBILIRUBIN 0.4 07/13/2021 03:10 PM    ALBUMIN 4.4 07/13/2021 03:10 PM    TOTPROTEIN 6.9 07/13/2021 03:10 PM    GLOBULIN 2.5 07/13/2021 03:10 PM    AGRATIO 1.8 07/13/2021 03:10 PM       Lab Results   Component Value Date/Time    CHOLSTRLTOT 168 09/14/2021 0734    TRIGLYCERIDE 40 09/14/2021 0734    HDL 80 09/14/2021 0734    LDL 80 09/14/2021 0734       Lab Results   Component Value Date/Time    TSHULTRASEN 1.640 09/14/2021 0734     Lab Results   Component Value Date/Time    FREET4 1.15 09/14/2021 0734     No results found for: FREET3  No results found for: THYSTIMIG    Lab Results   Component Value Date/Time    MICROSOMALA 17.0 (H) 09/14/2021 0734         Imaging:      ASSESSMENT/PLAN:   1. Hashimoto's disease  Biochemically stable with TSH and T4 levels within normal limits.    TPO antibody levels mildly elevated at 17 consistent with the diagnosis of Hashimoto's  Clinically unstable  She denies taking biotin  I will evaluate blood work including vitamin B12, iron a CBC and a CMP  Ultrasound ordered for evaluation    - TSH; Future  - FREE THYROXINE; Future  - US-THYROID; Future  -  VITAMIN B12; Future  - IRON/TOTAL IRON BIND; Future  - Comp Metabolic Panel; Future  - CBC WITH DIFFERENTIAL; Future    Discussed pathophysiology of Hashimoto's disease in relation to hormone  Signs and symptoms of hypo and hyperthyroidism  We will reevaluate blood work and ultrasound  If thyroid and vitamin B12 levels are within range, I will consider a trial of 25 mcg of Synthroid to evaluate the symptoms resolve  She is agreeable with this plan    2. High serum testosterone  3.  Polycystic ovarian syndrome:  She is currently taking Sprintec 0.25-35 MG-MCG and Metformin 500 mg daily  This is being followed by OB/GYN    3. Vitamin D deficiency:  Stable  Continue taking at 1000 IUs OTC  - VITAMIN D,25 HYDROXY; Future      Disposition: Low up with me on 3/15/2020 2 to review blood work and ultrasound results.     Thank you kindly for allowing me to participate in the thyroid care plan for this patient.    Danny Hubbard A.P.R.N.  01/05/22    CC:   Huyen Guzman P.A.-C.

## 2022-01-22 ENCOUNTER — HOSPITAL ENCOUNTER (OUTPATIENT)
Dept: LAB | Facility: MEDICAL CENTER | Age: 42
End: 2022-01-22
Payer: COMMERCIAL

## 2022-01-22 ENCOUNTER — HOSPITAL ENCOUNTER (OUTPATIENT)
Dept: RADIOLOGY | Facility: MEDICAL CENTER | Age: 42
End: 2022-01-22
Payer: COMMERCIAL

## 2022-01-22 ENCOUNTER — HOSPITAL ENCOUNTER (OUTPATIENT)
Dept: LAB | Facility: MEDICAL CENTER | Age: 42
End: 2022-01-22
Attending: STUDENT IN AN ORGANIZED HEALTH CARE EDUCATION/TRAINING PROGRAM
Payer: COMMERCIAL

## 2022-01-22 DIAGNOSIS — E06.3 HASHIMOTO'S DISEASE: ICD-10-CM

## 2022-01-22 DIAGNOSIS — E55.9 VITAMIN D DEFICIENCY: ICD-10-CM

## 2022-01-22 LAB
25(OH)D3 SERPL-MCNC: 145 NG/ML (ref 30–100)
ALBUMIN SERPL BCP-MCNC: 4.5 G/DL (ref 3.2–4.9)
ALBUMIN/GLOB SERPL: 1.5 G/DL
ALP SERPL-CCNC: 56 U/L (ref 30–99)
ALT SERPL-CCNC: 15 U/L (ref 2–50)
ANION GAP SERPL CALC-SCNC: 12 MMOL/L (ref 7–16)
AST SERPL-CCNC: 22 U/L (ref 12–45)
BASOPHILS # BLD AUTO: 2.3 % (ref 0–1.8)
BASOPHILS # BLD: 0.12 K/UL (ref 0–0.12)
BILIRUB SERPL-MCNC: 0.3 MG/DL (ref 0.1–1.5)
BUN SERPL-MCNC: 11 MG/DL (ref 8–22)
CALCIUM SERPL-MCNC: 9.4 MG/DL (ref 8.5–10.5)
CHLORIDE SERPL-SCNC: 102 MMOL/L (ref 96–112)
CO2 SERPL-SCNC: 23 MMOL/L (ref 20–33)
CREAT SERPL-MCNC: 0.66 MG/DL (ref 0.5–1.4)
EOSINOPHIL # BLD AUTO: 0.16 K/UL (ref 0–0.51)
EOSINOPHIL NFR BLD: 3 % (ref 0–6.9)
ERYTHROCYTE [DISTWIDTH] IN BLOOD BY AUTOMATED COUNT: 40.8 FL (ref 35.9–50)
GLOBULIN SER CALC-MCNC: 3 G/DL (ref 1.9–3.5)
GLUCOSE SERPL-MCNC: 81 MG/DL (ref 65–99)
HCT VFR BLD AUTO: 42.2 % (ref 37–47)
HGB BLD-MCNC: 13.9 G/DL (ref 12–16)
IMM GRANULOCYTES # BLD AUTO: 0.01 K/UL (ref 0–0.11)
IMM GRANULOCYTES NFR BLD AUTO: 0.2 % (ref 0–0.9)
IRON SATN MFR SERPL: 20 % (ref 15–55)
IRON SERPL-MCNC: 64 UG/DL (ref 40–170)
LYMPHOCYTES # BLD AUTO: 0.93 K/UL (ref 1–4.8)
LYMPHOCYTES NFR BLD: 17.5 % (ref 22–41)
MCH RBC QN AUTO: 29 PG (ref 27–33)
MCHC RBC AUTO-ENTMCNC: 32.9 G/DL (ref 33.6–35)
MCV RBC AUTO: 88.1 FL (ref 81.4–97.8)
MONOCYTES # BLD AUTO: 0.54 K/UL (ref 0–0.85)
MONOCYTES NFR BLD AUTO: 10.2 % (ref 0–13.4)
NEUTROPHILS # BLD AUTO: 3.56 K/UL (ref 2–7.15)
NEUTROPHILS NFR BLD: 66.8 % (ref 44–72)
NRBC # BLD AUTO: 0 K/UL
NRBC BLD-RTO: 0 /100 WBC
PLATELET # BLD AUTO: 210 K/UL (ref 164–446)
PMV BLD AUTO: 13.3 FL (ref 9–12.9)
POTASSIUM SERPL-SCNC: 3.9 MMOL/L (ref 3.6–5.5)
PROT SERPL-MCNC: 7.5 G/DL (ref 6–8.2)
RBC # BLD AUTO: 4.79 M/UL (ref 4.2–5.4)
RHEUMATOID FACT SER IA-ACNC: 95 IU/ML (ref 0–14)
SODIUM SERPL-SCNC: 137 MMOL/L (ref 135–145)
T4 FREE SERPL-MCNC: 1.24 NG/DL (ref 0.93–1.7)
TIBC SERPL-MCNC: 316 UG/DL (ref 250–450)
TSH SERPL DL<=0.005 MIU/L-ACNC: 1.13 UIU/ML (ref 0.38–5.33)
UIBC SERPL-MCNC: 252 UG/DL (ref 110–370)
VIT B12 SERPL-MCNC: 753 PG/ML (ref 211–911)
WBC # BLD AUTO: 5.3 K/UL (ref 4.8–10.8)

## 2022-01-22 PROCEDURE — 83540 ASSAY OF IRON: CPT

## 2022-01-22 PROCEDURE — 86038 ANTINUCLEAR ANTIBODIES: CPT

## 2022-01-22 PROCEDURE — 84443 ASSAY THYROID STIM HORMONE: CPT

## 2022-01-22 PROCEDURE — 84439 ASSAY OF FREE THYROXINE: CPT

## 2022-01-22 PROCEDURE — 86431 RHEUMATOID FACTOR QUANT: CPT

## 2022-01-22 PROCEDURE — 36415 COLL VENOUS BLD VENIPUNCTURE: CPT

## 2022-01-22 PROCEDURE — 82306 VITAMIN D 25 HYDROXY: CPT

## 2022-01-22 PROCEDURE — 86200 CCP ANTIBODY: CPT

## 2022-01-22 PROCEDURE — 80053 COMPREHEN METABOLIC PANEL: CPT

## 2022-01-22 PROCEDURE — 82607 VITAMIN B-12: CPT

## 2022-01-22 PROCEDURE — 76536 US EXAM OF HEAD AND NECK: CPT

## 2022-01-22 PROCEDURE — 85025 COMPLETE CBC W/AUTO DIFF WBC: CPT

## 2022-01-22 PROCEDURE — 83550 IRON BINDING TEST: CPT

## 2022-01-25 DIAGNOSIS — M25.542 ARTHRALGIA OF BOTH HANDS: ICD-10-CM

## 2022-01-25 DIAGNOSIS — R76.8 RHEUMATOID FACTOR POSITIVE: ICD-10-CM

## 2022-01-25 DIAGNOSIS — M25.541 ARTHRALGIA OF BOTH HANDS: ICD-10-CM

## 2022-01-25 LAB
CCP IGG SERPL-ACNC: 36 UNITS (ref 0–19)
NUCLEAR IGG SER QL IA: NORMAL

## 2022-02-04 ENCOUNTER — APPOINTMENT (OUTPATIENT)
Dept: RADIOLOGY | Facility: MEDICAL CENTER | Age: 42
End: 2022-02-04
Attending: STUDENT IN AN ORGANIZED HEALTH CARE EDUCATION/TRAINING PROGRAM
Payer: COMMERCIAL

## 2022-02-25 ENCOUNTER — HOSPITAL ENCOUNTER (OUTPATIENT)
Dept: RADIOLOGY | Facility: MEDICAL CENTER | Age: 42
End: 2022-02-25
Attending: STUDENT IN AN ORGANIZED HEALTH CARE EDUCATION/TRAINING PROGRAM
Payer: COMMERCIAL

## 2022-02-25 DIAGNOSIS — M25.542 ARTHRALGIA OF BOTH HANDS: ICD-10-CM

## 2022-02-25 DIAGNOSIS — R76.8 RHEUMATOID FACTOR POSITIVE: ICD-10-CM

## 2022-02-25 DIAGNOSIS — M25.541 ARTHRALGIA OF BOTH HANDS: ICD-10-CM

## 2022-02-25 PROCEDURE — 77077 JOINT SURVEY SINGLE VIEW: CPT

## 2022-03-01 DIAGNOSIS — F41.1 GENERALIZED ANXIETY DISORDER: ICD-10-CM

## 2022-03-04 RX ORDER — ALPRAZOLAM 0.5 MG/1
TABLET ORAL
Qty: 30 TABLET | Refills: 2 | OUTPATIENT
Start: 2022-03-04

## 2022-03-07 DIAGNOSIS — F41.1 GENERALIZED ANXIETY DISORDER: ICD-10-CM

## 2022-03-09 RX ORDER — ALPRAZOLAM 0.5 MG/1
0.5 TABLET ORAL PRN
Qty: 30 TABLET | Refills: 1 | OUTPATIENT
Start: 2022-03-09 | End: 2022-06-07

## 2022-03-15 ENCOUNTER — OFFICE VISIT (OUTPATIENT)
Dept: ENDOCRINOLOGY | Facility: MEDICAL CENTER | Age: 42
End: 2022-03-15
Payer: COMMERCIAL

## 2022-03-15 VITALS
DIASTOLIC BLOOD PRESSURE: 70 MMHG | SYSTOLIC BLOOD PRESSURE: 104 MMHG | HEART RATE: 124 BPM | HEIGHT: 61 IN | BODY MASS INDEX: 20.03 KG/M2 | OXYGEN SATURATION: 100 % | WEIGHT: 106.1 LBS

## 2022-03-15 DIAGNOSIS — R79.89 HIGH SERUM VITAMIN D: ICD-10-CM

## 2022-03-15 DIAGNOSIS — E06.3 HASHIMOTO'S DISEASE: ICD-10-CM

## 2022-03-15 PROCEDURE — 99211 OFF/OP EST MAY X REQ PHY/QHP: CPT

## 2022-03-15 PROCEDURE — 99213 OFFICE O/P EST LOW 20 MIN: CPT

## 2022-03-15 ASSESSMENT — FIBROSIS 4 INDEX: FIB4 SCORE: 1.05

## 2022-03-15 NOTE — PROGRESS NOTES
Chief Complaint: Follow up for Primary Hypothyroidism    HPI:   1.  Hashimoto's disease:  Felisha Pedraza is a 41 y.o. female here for follow up of Primary Hypothyroidism.      Since last visit patient reports feeling good.    She reports a family history of thyroid disease with her mother and grandmother    Her main symptom is fatigue      Her thyroid hormone levels came back within range   Ref. Range 1/22/2022 13:49   TSH Latest Ref Range: 0.380 - 5.330 uIU/mL 1.130   Free T-4 Latest Ref Range: 0.93 - 1.70 ng/dL 1.24     Vitamin B12 came back within range   Ref. Range 1/22/2022 13:49   Vitamin B12 -True Cobalamin Latest Ref Range: 211 - 911 pg/mL 753     Ultrasound on 1/22/2022 showed a heterogeneous thyroid gland consistent with thyroiditis, but no discrete thyroid nodules     Antibodies for Hashimoto's came back mildly elevated   Ref. Range 9/14/2021 07:34   Microsomal -Tpo- Abs Latest Ref Range: 0.0 - 9.0 IU/mL 17.0 (H)     2.  High serum vitamin D:  Currently taking 1000IUs    Ref. Range 1/22/2022 13:49   25-Hydroxy   Vitamin D 25 Latest Ref Range: 30 - 100 ng/mL 145 (H)     Patient's medications, allergies, and social histories were reviewed and updated as appropriate.      ROS:     CONS:     No fever, no chills   EYES:     No diplopia, no blurry vision   CV:           No chest pain, no palpitations   PULM:     No SOB, no cough, no hemoptysis.   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  2018-04:  "Hypertrichiasis  2018: Neoplasm of uncertain behavior of skin  2018: Hair loss  2017: AMA (advanced maternal age) multigravida 35+  2017: Previous  section complicating pregnancy  2017: HSV infection  2013: Irregular menstrual cycle  2012: Opiate dependence ([facility])  2012: PCOS (polycystic ovarian syndrome)      Past Surgical History:  Past Surgical History:   Procedure Laterality Date   • REPEAT C SECTION  2017    Procedure: REPEAT C SECTION;  Surgeon: Sara Guzman M.D.;  Location: LABOR AND DELIVERY;  Service: Labor and Delivery   • PELVISCOPY  4/3/2013    Performed by Shin Mayo M.D. at [facility]   • CYST EXCISION  4/3/2013    [facility]   • OTHER ORTHOPEDIC SURGERY      clavical right        Allergies:  Patient has no known allergies.     Social History:  Social History     Tobacco Use   • Smoking status: Never Smoker   • Smokeless tobacco: Never Used   Vaping Use   • Vaping Use: Never used   Substance Use Topics   • Alcohol use: Yes     Comment: 1-2 drinks daily - wine or tequila    • Drug use: Not Currently     Types: Marijuana        Family History:   family history includes Cancer in her father; Hypertension in her father; Thyroid in her mother.      PHYSICAL EXAM:   Vital signs: /70 (BP Location: Left arm, Patient Position: Sitting, BP Cuff Size: Adult)   Pulse (!) 124   Ht 1.549 m (5' 1\")   Wt 48.1 kg (106 lb 1.6 oz)   SpO2 100%   BMI 20.05 kg/m²   GENERAL: Well-developed, well-nourished in no apparent distress.   EYE:  No ocular asymmetry, PERRLA  HENT: Pink, moist mucous membranes.    NECK: No thyromegaly.   CARDIOVASCULAR:  No murmurs  LUNGS: Clear breath sounds  ABDOMEN: Soft, nontender   EXTREMITIES: No clubbing, cyanosis, or edema.   NEUROLOGICAL: No gross focal motor abnormalities   LYMPH: No cervical adenopathy palpated.   SKIN: No rashes, lesions.       Labs:  Lab " Results   Component Value Date/Time    SODIUM 137 01/22/2022 01:49 PM    POTASSIUM 3.9 01/22/2022 01:49 PM    CHLORIDE 102 01/22/2022 01:49 PM    CO2 23 01/22/2022 01:49 PM    ANION 12.0 01/22/2022 01:49 PM    GLUCOSE 81 01/22/2022 01:49 PM    BUN 11 01/22/2022 01:49 PM    CREATININE 0.66 01/22/2022 01:49 PM    CALCIUM 9.4 01/22/2022 01:49 PM    ASTSGOT 22 01/22/2022 01:49 PM    ALTSGPT 15 01/22/2022 01:49 PM    TBILIRUBIN 0.3 01/22/2022 01:49 PM    ALBUMIN 4.5 01/22/2022 01:49 PM    TOTPROTEIN 7.5 01/22/2022 01:49 PM    GLOBULIN 3.0 01/22/2022 01:49 PM    AGRATIO 1.5 01/22/2022 01:49 PM       Lab Results   Component Value Date/Time    SODIUM 137 01/22/2022 1349    POTASSIUM 3.9 01/22/2022 1349    CHLORIDE 102 01/22/2022 1349    CO2 23 01/22/2022 1349    GLUCOSE 81 01/22/2022 1349    BUN 11 01/22/2022 1349    CREATININE 0.66 01/22/2022 1349    CALCIUM 9.4 01/22/2022 1349    ANION 12.0 01/22/2022 1349       Lab Results   Component Value Date/Time    CHOLSTRLTOT 168 09/14/2021 0734    TRIGLYCERIDE 40 09/14/2021 0734    HDL 80 09/14/2021 0734    LDL 80 09/14/2021 0734       Lab Results   Component Value Date/Time    TSHULTRASEN 1.130 01/22/2022 1349     Lab Results   Component Value Date/Time    FREET4 1.24 01/22/2022 1349     No results found for: FREET3  No results found for: THYSTIMIG    Lab Results   Component Value Date/Time    MICROSOMALA 17.0 (H) 09/14/2021 0734         Imaging:      ASSESSMENT/PLAN:   1. Hashimoto's disease  Stable  Thyroid hormone levels not requiring replacement at this time    2. High serum vitamin D  Unstable  Stopped taking vitamin D for the next 6 months  Please do the following blood work in 6 months  - VITAMIN D,25 HYDROXY; Future    Disposition: Referred back to primary care provider, recommended to repeat thyroid hormone levels in a year, recommendation to repeat ultrasound in 2 years  She will do her vitamin D blood work in 6 months, I will message her through [employer] on what to do,  she will follow up with primary care provider after that    Thank you kindly for allowing me to participate in the thyroid care plan for this patient.    SUNIL GraffRJaneyN.  03/15/22    CC:   Huyen Guzman P.A.-C.

## 2022-06-13 ENCOUNTER — APPOINTMENT (OUTPATIENT)
Dept: BEHAVIORAL HEALTH | Facility: CLINIC | Age: 42
End: 2022-06-13
Payer: COMMERCIAL

## 2022-06-13 ENCOUNTER — PATIENT MESSAGE (OUTPATIENT)
Dept: MEDICAL GROUP | Facility: MEDICAL CENTER | Age: 42
End: 2022-06-13

## 2022-06-13 ENCOUNTER — TELEMEDICINE (OUTPATIENT)
Dept: MEDICAL GROUP | Facility: MEDICAL CENTER | Age: 42
End: 2022-06-13
Payer: COMMERCIAL

## 2022-06-13 VITALS — BODY MASS INDEX: 20.01 KG/M2 | HEIGHT: 61 IN | WEIGHT: 106 LBS

## 2022-06-13 DIAGNOSIS — F60.5 OBSESSIVE COMPULSIVE PERSONALITY DISORDER (HCC): ICD-10-CM

## 2022-06-13 DIAGNOSIS — F41.1 GENERALIZED ANXIETY DISORDER: ICD-10-CM

## 2022-06-13 DIAGNOSIS — F51.05 INSOMNIA SECONDARY TO ANXIETY: ICD-10-CM

## 2022-06-13 DIAGNOSIS — E28.2 PCOS (POLYCYSTIC OVARIAN SYNDROME): ICD-10-CM

## 2022-06-13 DIAGNOSIS — F41.9 INSOMNIA SECONDARY TO ANXIETY: ICD-10-CM

## 2022-06-13 PROCEDURE — 99214 OFFICE O/P EST MOD 30 MIN: CPT | Mod: 95 | Performed by: STUDENT IN AN ORGANIZED HEALTH CARE EDUCATION/TRAINING PROGRAM

## 2022-06-13 RX ORDER — ALPRAZOLAM 0.5 MG/1
0.5 TABLET ORAL PRN
Qty: 30 TABLET | Refills: 1 | Status: SHIPPED | OUTPATIENT
Start: 2022-06-13 | End: 2022-06-15 | Stop reason: SDUPTHER

## 2022-06-13 RX ORDER — METFORMIN HYDROCHLORIDE 500 MG/1
500 TABLET, EXTENDED RELEASE ORAL DAILY
Qty: 90 TABLET | Refills: 3 | Status: SHIPPED | OUTPATIENT
Start: 2022-06-13 | End: 2023-08-04

## 2022-06-13 RX ORDER — VENLAFAXINE HYDROCHLORIDE 37.5 MG/1
75 CAPSULE, EXTENDED RELEASE ORAL DAILY
Qty: 60 CAPSULE | Refills: 3 | Status: SHIPPED | OUTPATIENT
Start: 2022-06-13 | End: 2022-08-01

## 2022-06-13 RX ORDER — VENLAFAXINE HYDROCHLORIDE 75 MG/1
75 CAPSULE, EXTENDED RELEASE ORAL DAILY
Qty: 30 CAPSULE | Refills: 3 | Status: SHIPPED
Start: 2022-06-13 | End: 2022-06-13

## 2022-06-13 ASSESSMENT — FIBROSIS 4 INDEX: FIB4 SCORE: 1.05

## 2022-06-13 ASSESSMENT — PATIENT HEALTH QUESTIONNAIRE - PHQ9: CLINICAL INTERPRETATION OF PHQ2 SCORE: 0

## 2022-06-13 NOTE — PROGRESS NOTES
Virtual Visit: Established Patient   This visit was conducted via Zoom using secure and encrypted videoconferencing technology.   The patient was in their home in the [facility].    The patient's identity was confirmed and verbal consent was obtained for this virtual visit.     Subjective:   CC:   Chief Complaint   Patient presents with   • Medication Refill     Xanax, metformin and ibuprofen       Felisha Pedraza is a 41 y.o. female presenting for evaluation and management of:      Anxiety  Patient following with behavioral health for anxiety and OCD.  Patient using Xanax as needed for anxiety and states she mostly uses the medication at night when she is unable to sleep.  Patient using approximately 10 tablets/month.  After last visit, patient decided that she would like to try Effexor.  Patient has started on Effexor over the last 6 months and notes that it does take the edge off but continues to have significant anxiety and depression.  She is interested in increasing her dose of Effexor for better control of her social anxiety but is concerned about withdrawal symptoms if she decides to come off of the medication.  Discussed when she wants to come off of the medication we will use a slow titration off.    Chronic use of benzo for therapeutic purpose  No aberrant or addictive use of medications has been observed.  No adverse events have been reported. Patient counseled to keep medications locked up or under personal control.  [facility] board of pharmacy interface is reviewed.  No inconsistencies are found.  This is within CDC guideline for anxiety prescribing by primary care.    PCOS  Patient continues on oral contraceptive pill and metformin for PCOS symptoms.  Patient will schedule follow-up with GYN for further evaluation.      Rheumatoid arthritis  Patient's labs came back positive for rheumatoid factor and elevated CRP.  Patient does have an appointment coming up with rheumatologist.  Patient continues  "to note swelling in her fingers.    ROS   See HPI     Objective:   Ht 1.549 m (5' 1\")   Wt 48.1 kg (106 lb)   BMI 20.03 kg/m²     Physical Exam:  Constitutional: Alert, no distress, well-groomed.  Skin: No rashes in visible areas.  Eye: Round. Conjunctiva clear, lids normal. No icterus.   ENMT: Lips pink without lesions, good dentition, moist mucous membranes. Phonation normal.  Neck: No masses, no thyromegaly. Moves freely without pain.  Respiratory: Unlabored respiratory effort, no cough or audible wheeze  Psych: Alert and oriented x3, normal affect and mood.     Assessment and Plan:   The following treatment plan was discussed:     1. PCOS (polycystic ovarian syndrome)  Chronic, stable.  Patient advised to follow-up with GYN.  Patient continues on metformin for PCOS.  - metFORMIN ER (GLUCOPHAGE XR) 500 MG TABLET SR 24 HR; Take 1 Tablet by mouth every day.  Dispense: 90 Tablet; Refill: 3    2. KEVIN (generalized anxiety disorder)  Chronic, uncontrolled.  Patient continues to note generalized anxiety and depression.  Patient is interested in increasing her Effexor but concerned about withdrawal symptoms.  Discussed slow transition off of medication when time comes.  We will trial increased dose at 75 mg.  - venlafaxine XR (EFFEXOR XR) 75 MG CAPSULE SR 24 HR; Take 1 Capsule by mouth every day.  Dispense: 30 Capsule; Refill: 3    3. Obsessive compulsive personality disorder (HCC)  Chronic, stable.  PDMP reviewed.  No aberrant or addictive use.  30-day prescription lasted 6 months.  - ALPRAZolam (XANAX) 0.5 MG Tab; Take 1 Tablet by mouth as needed for Sleep or Anxiety for up to 90 days.  Dispense: 30 Tablet; Refill: 1    4. Insomnia secondary to anxiety  - ALPRAZolam (XANAX) 0.5 MG Tab; Take 1 Tablet by mouth as needed for Sleep or Anxiety for up to 90 days.  Dispense: 30 Tablet; Refill: 1      Follow-up: Return in about 6 months (around 12/13/2022).         "

## 2022-06-14 ENCOUNTER — TELEPHONE (OUTPATIENT)
Dept: MEDICAL GROUP | Facility: MEDICAL CENTER | Age: 42
End: 2022-06-14
Payer: COMMERCIAL

## 2022-06-14 DIAGNOSIS — F41.9 INSOMNIA SECONDARY TO ANXIETY: ICD-10-CM

## 2022-06-14 DIAGNOSIS — F51.05 INSOMNIA SECONDARY TO ANXIETY: ICD-10-CM

## 2022-06-14 DIAGNOSIS — F60.5 OBSESSIVE COMPULSIVE PERSONALITY DISORDER (HCC): ICD-10-CM

## 2022-06-14 NOTE — TELEPHONE ENCOUNTER
"Pharmacy called stating the prescription for Alprazolam needs to be specific on how often it should be taken... they can't dispense it \"as needed\"  "

## 2022-06-15 RX ORDER — ALPRAZOLAM 0.5 MG/1
0.5 TABLET ORAL NIGHTLY PRN
Qty: 30 TABLET | Refills: 1 | Status: SHIPPED | OUTPATIENT
Start: 2022-06-15 | End: 2022-09-13

## 2022-06-27 ENCOUNTER — TELEMEDICINE (OUTPATIENT)
Dept: BEHAVIORAL HEALTH | Facility: CLINIC | Age: 42
End: 2022-06-27
Payer: COMMERCIAL

## 2022-06-27 DIAGNOSIS — F60.5 OBSESSIVE COMPULSIVE PERSONALITY DISORDER (HCC): ICD-10-CM

## 2022-06-27 DIAGNOSIS — F41.1 GENERALIZED ANXIETY DISORDER: ICD-10-CM

## 2022-06-27 PROCEDURE — 90837 PSYTX W PT 60 MINUTES: CPT | Mod: GT | Performed by: SOCIAL WORKER

## 2022-06-27 NOTE — PROGRESS NOTES
[facility]   Therapy Progress Note    This visit was conducted via Zoom using secure and encrypted videoconferencing technology.  The patient was in her home in the [facility].  The patient's identity was confirmed and verbal consent was obtained for this virtual visit.    Name: Felisha Pedraza  MRN: [id number]  : 1980  Age: 41 y.o.  Date of assessment: 2022  PCP: Huyen Guzman P.A.-C..  Persons in attendance: Patient  Total session time: 56 minutes    Topics addressed in psychotherapy include: Pt to video indiv appt. Off Zoloft, having hair loss; Taking Effexor 37.5 to 75 mg XR (1 day increase);  since xmas. Takes Xanax PRN, 2 xm. Dx Hashimoto and arthritis by holistic dr. Not drinking as much, 1 white claw few days week. Had a good year, made amends with principal, Helps with anxiety, ruminating less. Husb doesn't6 notice change. 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 Provided psycho edu on OCD/anxiety/meds. Plan: See biweekly.     Objective Observations:   Participation:Active verbal participation, Engaged and Open to feedback   Grooming:Casual   Cognition:Fully Oriented   Eye Contact:Good   Mood:Euthymic   Affect:Constricted and Congruent with content   Thought Process:Logical   Speech:Rate within normal limits and Volume within normal limits    Current Risk:   Suicide: low   Homicide: NA   Self-Harm: NA   Relapse: NA   Safety Plan Reviewed: NA    Care Plan Updated: No    Does patient express agreement with the above plan? Yes     Diagnosis:  1. KEVIN  2. Mixed obsessional thoughts acts    Therapeutic Intervention(s): Distress tolerance skills, Goal-setting, Interpersonal effectiveness skills and Self-care skills    Treatment Goal(s)/Objective(s) addressed: anxiety     Progress toward Treatment Goals: Mild improvement    Referral appointment(s) scheduled? No       Shasha Toro L.C.S.W.

## 2022-07-11 ENCOUNTER — TELEMEDICINE (OUTPATIENT)
Dept: BEHAVIORAL HEALTH | Facility: CLINIC | Age: 42
End: 2022-07-11
Payer: COMMERCIAL

## 2022-07-11 DIAGNOSIS — F41.1 GENERALIZED ANXIETY DISORDER: ICD-10-CM

## 2022-07-11 DIAGNOSIS — F60.5 OBSESSIVE COMPULSIVE PERSONALITY DISORDER (HCC): ICD-10-CM

## 2022-07-11 PROCEDURE — 90837 PSYTX W PT 60 MINUTES: CPT | Mod: 95 | Performed by: SOCIAL WORKER

## 2022-07-11 NOTE — PROGRESS NOTES
[facility]   Therapy Progress Note    This visit was conducted via Zoom using secure and encrypted videoconferencing technology.  The patient was in her home in the [facility].  The patient's identity was confirmed and verbal consent was obtained for this virtual visit.    Name: Felisha Pedraza  MRN: [id number]  : 1980  Age: 41 y.o.  Date of assessment: 2022  PCP: Huyen Guzman P.A.-C..  Persons in attendance: Patient  Total session time: 55 minutes    Topics addressed in psychotherapy include: Pt to [employer] video appt. Taking opportunity to relax/vacay, staying with parents while husb has kids. Feels Effexor helps with shutting off brain, therefore is taking less unisom. Pt proud of feeling less anxious and letting kids come o house for play dates, having kids friends in car, allowing kids to roll in dirt camping (all things she would not do in past); pProblem solved OCD, cleanliness options. Explored conflicts in work setting and be confident in decision making.  Plan: see biweekly.     Objective Observations:   Participation:Active verbal participation, Engaged and Open to feedback   Grooming:Casual   Cognition:Fully Oriented   Eye Contact:Good   Mood:Euthymic   Affect:Congruent with content   Thought Process:Goal-directed   Speech:Rate within normal limits and Volume within normal limits    Current Risk:   Suicide: nA   Homicide: NA   Self-Harm: NA   Relapse: NA   Safety Plan Reviewed: no    Care Plan Updated: No    Does patient express agreement with the above plan? Yes     Diagnosis:  1. KEVIN  2. OCD    Therapeutic Intervention(s): Cognitive modification, Distress tolerance skills, Positive behavior reinforced and Stressors assessed    Treatment Goal(s)/Objective(s) addressed: anxiety     Progress toward Treatment Goals: Moderate improvement    Referral appointment(s) scheduled? No       Shasha Toro L.C.S.W.

## 2022-07-26 ENCOUNTER — TELEMEDICINE (OUTPATIENT)
Dept: BEHAVIORAL HEALTH | Facility: CLINIC | Age: 42
End: 2022-07-26
Payer: COMMERCIAL

## 2022-07-26 DIAGNOSIS — F60.5 OBSESSIVE COMPULSIVE PERSONALITY DISORDER (HCC): ICD-10-CM

## 2022-07-26 DIAGNOSIS — F41.1 GENERALIZED ANXIETY DISORDER: ICD-10-CM

## 2022-07-26 PROCEDURE — 90837 PSYTX W PT 60 MINUTES: CPT | Mod: GT | Performed by: SOCIAL WORKER

## 2022-07-26 NOTE — PROGRESS NOTES
"[facility]   Therapy Progress Note    This visit was conducted via Zoom using secure and encrypted videoconferencing technology.  The patient was in her home in the [facility].  The patient's identity was confirmed and verbal consent was obtained for this virtual visit.    Name: Felisha Pedraza  MRN: [id number]  : 1980  Age: 41 y.o.  Date of assessment: 2022  PCP: Huyen Guzman P.A.-C..  Persons in attendance: Patient  Total session time: 56 minutes    Topics addressed in psychotherapy include: Pt to indiv in office appt. Pt reports less anxiety going back to school this yr, \"not looking at roster\" has helped curb thoughts. Clnical focus on OCD cleanliness and  kids behavior from her own anxiety, strategies to work on this behavior. Pt and husb conflict over OCD and her \"craziness.\" Explored some relationships that are energy depleting. Plan: see biweekly.       Objective Observations:   Participation:Active verbal participation and Open to feedback   Grooming:Casual   Cognition:Fully Oriented   Eye Contact:Good   Mood:Euthymic   Affect:Blunted   Thought Process:Goal-directed   Speech:Rate within normal limits and Volume within normal limits    Current Risk:   Suicide: low   Homicide: NA   Self-Harm: NA   Relapse: NA   Safety Plan Reviewed: no    Care Plan Updated: No    Does patient express agreement with the above plan? Yes     Diagnosis:  1. KEVIN  2. OCD    Therapeutic Intervention(s): Cognitive modification, Interpersonal effectiveness skills and Positive behavior reinforced    Treatment Goal(s)/Objective(s) addressed: anxiety     Progress toward Treatment Goals: Moderate improvement    Referral appointment(s) scheduled? No       Shasha Toro L.C.S.W.    "

## 2022-08-08 DIAGNOSIS — L67.8 ABNORMAL HAIR PATTERN: ICD-10-CM

## 2022-08-08 DIAGNOSIS — E28.2 PCOS (POLYCYSTIC OVARIAN SYNDROME): ICD-10-CM

## 2022-08-08 RX ORDER — NORGESTIMATE AND ETHINYL ESTRADIOL 0.25-0.035
1 KIT ORAL DAILY
Qty: 28 TABLET | Refills: 1 | Status: SHIPPED | OUTPATIENT
Start: 2022-08-08 | End: 2022-09-28 | Stop reason: SDUPTHER

## 2022-08-14 ENCOUNTER — HOSPITAL ENCOUNTER (OUTPATIENT)
Facility: MEDICAL CENTER | Age: 42
End: 2022-08-14
Attending: NURSE PRACTITIONER
Payer: COMMERCIAL

## 2022-08-14 ENCOUNTER — OFFICE VISIT (OUTPATIENT)
Dept: URGENT CARE | Facility: CLINIC | Age: 42
End: 2022-08-14
Payer: COMMERCIAL

## 2022-08-14 VITALS
BODY MASS INDEX: 20.84 KG/M2 | RESPIRATION RATE: 14 BRPM | OXYGEN SATURATION: 98 % | DIASTOLIC BLOOD PRESSURE: 60 MMHG | WEIGHT: 110.4 LBS | HEIGHT: 61 IN | HEART RATE: 106 BPM | TEMPERATURE: 97.9 F | SYSTOLIC BLOOD PRESSURE: 100 MMHG

## 2022-08-14 DIAGNOSIS — N30.01 ACUTE CYSTITIS WITH HEMATURIA: ICD-10-CM

## 2022-08-14 DIAGNOSIS — N39.0 RECURRENT UTI: ICD-10-CM

## 2022-08-14 DIAGNOSIS — R35.0 URINARY FREQUENCY: ICD-10-CM

## 2022-08-14 DIAGNOSIS — R30.0 DYSURIA: ICD-10-CM

## 2022-08-14 DIAGNOSIS — R39.15 URINARY URGENCY: ICD-10-CM

## 2022-08-14 LAB
APPEARANCE UR: NORMAL
BILIRUB UR STRIP-MCNC: NEGATIVE MG/DL
COLOR UR AUTO: NORMAL
GLUCOSE UR STRIP.AUTO-MCNC: NEGATIVE MG/DL
INT CON NEG: NEGATIVE
INT CON POS: POSITIVE
KETONES UR STRIP.AUTO-MCNC: NEGATIVE MG/DL
LEUKOCYTE ESTERASE UR QL STRIP.AUTO: NORMAL
NITRITE UR QL STRIP.AUTO: NEGATIVE
PH UR STRIP.AUTO: 5.5 [PH] (ref 5–8)
POC URINE PREGNANCY TEST: NEGATIVE
PROT UR QL STRIP: NEGATIVE MG/DL
RBC UR QL AUTO: NORMAL
SP GR UR STRIP.AUTO: 1.03
UROBILINOGEN UR STRIP-MCNC: 0.2 MG/DL

## 2022-08-14 PROCEDURE — 87077 CULTURE AEROBIC IDENTIFY: CPT

## 2022-08-14 PROCEDURE — 81025 URINE PREGNANCY TEST: CPT | Performed by: NURSE PRACTITIONER

## 2022-08-14 PROCEDURE — 87086 URINE CULTURE/COLONY COUNT: CPT

## 2022-08-14 PROCEDURE — 99214 OFFICE O/P EST MOD 30 MIN: CPT | Performed by: NURSE PRACTITIONER

## 2022-08-14 PROCEDURE — 81002 URINALYSIS NONAUTO W/O SCOPE: CPT | Performed by: NURSE PRACTITIONER

## 2022-08-14 RX ORDER — NITROFURANTOIN 25; 75 MG/1; MG/1
100 CAPSULE ORAL EVERY 12 HOURS
Qty: 10 CAPSULE | Refills: 0 | Status: SHIPPED | OUTPATIENT
Start: 2022-08-14 | End: 2022-08-14

## 2022-08-14 RX ORDER — PHENAZOPYRIDINE HYDROCHLORIDE 200 MG/1
200 TABLET, FILM COATED ORAL 3 TIMES DAILY
Qty: 6 TABLET | Refills: 0 | Status: SHIPPED | OUTPATIENT
Start: 2022-08-14 | End: 2022-08-14

## 2022-08-14 RX ORDER — PHENAZOPYRIDINE HYDROCHLORIDE 200 MG/1
200 TABLET, FILM COATED ORAL 3 TIMES DAILY
Qty: 6 TABLET | Refills: 0 | Status: SHIPPED | OUTPATIENT
Start: 2022-08-14 | End: 2022-08-16

## 2022-08-14 RX ORDER — VENLAFAXINE HYDROCHLORIDE 75 MG/1
CAPSULE, EXTENDED RELEASE ORAL
COMMUNITY
Start: 2022-07-14 | End: 2022-09-23

## 2022-08-14 RX ORDER — NITROFURANTOIN 25; 75 MG/1; MG/1
100 CAPSULE ORAL EVERY 12 HOURS
Qty: 10 CAPSULE | Refills: 0 | Status: SHIPPED | OUTPATIENT
Start: 2022-08-14 | End: 2022-08-19

## 2022-08-14 ASSESSMENT — ENCOUNTER SYMPTOMS
VOMITING: 0
FEVER: 0
NAUSEA: 0
CHILLS: 0

## 2022-08-14 ASSESSMENT — FIBROSIS 4 INDEX: FIB4 SCORE: 1.08

## 2022-08-14 NOTE — PROGRESS NOTES
Subjective:     Felisha Pedraza is a 42 y.o. female who presents for Dysuria (Burning throughout her whole body, urinating blood, frequency since this AM)      Dysuria   This is a new problem. The current episode started today (Felisha is a pleasant 42-year-old female presents to urgent care today with complaints of dysuria, urgency, abdominal pain, frequency and hematuria that started today.). The problem occurs every urination. The problem has been unchanged (She notes her symptoms are improved after taking 1 dose of flagyl). The pain is severe. There has been no fever. She is Sexually active. Associated symptoms include frequency, hematuria and urgency. Pertinent negatives include no chills, nausea, possible pregnancy or vomiting. Treatments tried: cranberry pill. Her past medical history is significant for recurrent UTIs.       Review of Systems   Constitutional:  Negative for chills, fever and malaise/fatigue.   Gastrointestinal:  Negative for nausea and vomiting.   Genitourinary:  Positive for dysuria, frequency, hematuria and urgency.     PMH:   Past Medical History:   Diagnosis Date    Migraine     Other specified symptom associated with female genital organs     Panic disorder     PCOS (polycystic ovarian syndrome)      ALLERGIES: No Known Allergies  SURGHX:   Past Surgical History:   Procedure Laterality Date    REPEAT C SECTION  9/2/2017    Procedure: REPEAT C SECTION;  Surgeon: Sara Guzman M.D.;  Location: LABOR AND DELIVERY;  Service: Labor and Delivery    PELVISCOPY  4/3/2013    [facility]    CYST EXCISION  4/3/2013    Performed by Shin [facility] M.D. [facility]    OTHER ORTHOPEDIC SURGERY  2011    clavical right     SOCHX:   Social History     Socioeconomic History    Marital status:    Tobacco Use    Smoking status: Never    Smokeless tobacco: Never   Vaping Use    Vaping Use: Never used   Substance and Sexual  "Activity    Alcohol use: Yes     Comment: 1-2 drinks daily - wine or tequila     Drug use: Not Currently     Types: Marijuana    Sexual activity: Yes     Partners: Male     Birth control/protection: None     FH:   Family History   Problem Relation Age of Onset    Thyroid Mother         hyperthyroidsim     Cancer Father     Hypertension Father          Objective:   /60 (BP Location: Left arm, Patient Position: Sitting, BP Cuff Size: Adult)   Pulse (!) 106   Temp 36.6 °C (97.9 °F) (Temporal)   Resp 14   Ht 1.549 m (5' 1\")   Wt 50.1 kg (110 lb 6.4 oz)   SpO2 98%   BMI 20.86 kg/m²     Physical Exam  Vitals and nursing note reviewed.   Constitutional:       General: She is not in acute distress.     Appearance: Normal appearance. She is not ill-appearing.   HENT:      Head: Normocephalic and atraumatic.      Right Ear: External ear normal.      Left Ear: External ear normal.      Nose: No congestion or rhinorrhea.      Mouth/Throat:      Mouth: Mucous membranes are moist.   Eyes:      Extraocular Movements: Extraocular movements intact.      Pupils: Pupils are equal, round, and reactive to light.   Cardiovascular:      Rate and Rhythm: Normal rate and regular rhythm.      Pulses: Normal pulses.      Heart sounds: Normal heart sounds.   Pulmonary:      Effort: Pulmonary effort is normal.      Breath sounds: Normal breath sounds.   Abdominal:      General: Abdomen is flat. Bowel sounds are normal.      Palpations: Abdomen is soft.      Tenderness: There is abdominal tenderness. There is no right CVA tenderness or left CVA tenderness.   Musculoskeletal:         General: Normal range of motion.      Cervical back: Normal range of motion and neck supple.   Skin:     General: Skin is warm and dry.      Capillary Refill: Capillary refill takes less than 2 seconds.   Neurological:      General: No focal deficit present.      Mental Status: She is alert and oriented to person, place, and time. Mental status is at " baseline.   Psychiatric:         Mood and Affect: Mood normal.         Behavior: Behavior normal.         Thought Content: Thought content normal.         Judgment: Judgment normal.     Results for orders placed or performed in visit on 08/14/22   POCT Urinalysis   Result Value Ref Range    POC Color DARK YELLOW Negative    POC Appearance SLIGHTLY CLOUDY Negative    POC Leukocyte Esterase TRACE Negative    POC Nitrites NEGATIVE Negative    POC Urobiligen 0.2 Negative (0.2) mg/dL    POC Protein NEGATIVE Negative mg/dL    POC Urine PH 5.5 5.0 - 8.0    POC Blood MODERATE Negative    POC Specific Gravity 1.030 <1.005 - >1.030    POC Ketones NEGATIVE Negative mg/dL    POC Bilirubin NEGATIVE Negative mg/dL    POC Glucose NEGATIVE Negative mg/dL       Assessment/Plan:   Assessment      1. Acute cystitis with hematuria  POCT Urinalysis    nitrofurantoin (MACROBID) 100 MG Cap    DISCONTINUED: nitrofurantoin (MACROBID) 100 MG Cap      2. Recurrent UTI  Referral to Urology      3. Dysuria  Urine Culture    phenazopyridine (PYRIDIUM) 200 MG Tab    DISCONTINUED: phenazopyridine (PYRIDIUM) 200 MG Tab      4. Urinary frequency  Urine Culture    phenazopyridine (PYRIDIUM) 200 MG Tab    DISCONTINUED: phenazopyridine (PYRIDIUM) 200 MG Tab      5. Urinary urgency  Urine Culture    phenazopyridine (PYRIDIUM) 200 MG Tab    DISCONTINUED: phenazopyridine (PYRIDIUM) 200 MG Tab      Urine was sent for culture.  I will notify her of results.  Prescription was sent in to preferred pharmacy. AVS was given and reviewed with patient. Patient educated on red flags of UTI and encouraged to seek care back in UC or ER for  fever, chills, flank pain, or worsening symptoms.    AVS handout given and reviewed with patient. Pt educated on red flags and when to seek treatment back in ER or UC.

## 2022-08-14 NOTE — PATIENT INSTRUCTIONS
Urinary Tract Infection, Adult  A urinary tract infection (UTI) is an infection of any part of the urinary tract. The urinary tract includes:  The kidneys.  The ureters.  The bladder.  The urethra.  These organs make, store, and get rid of pee (urine) in the body.  What are the causes?  This is caused by germs (bacteria) in your genital area. These germs grow and cause swelling (inflammation) of your urinary tract.  What increases the risk?  You are more likely to develop this condition if:  You have a small, thin tube (catheter) to drain pee.  You cannot control when you pee or poop (incontinence).  You are female, and:  You use these methods to prevent pregnancy:  A medicine that kills sperm (spermicide).  A device that blocks sperm (diaphragm).  You have low levels of a female hormone (estrogen).  You are pregnant.  You have genes that add to your risk.  You are sexually active.  You take antibiotic medicines.  You have trouble peeing because of:  A prostate that is bigger than normal, if you are male.  A blockage in the part of your body that drains pee from the bladder (urethra).  A kidney stone.  A nerve condition that affects your bladder (neurogenic bladder).  Not getting enough to drink.  Not peeing often enough.  You have other conditions, such as:  Diabetes.  A weak disease-fighting system (immune system).  Sickle cell disease.  Gout.  Injury of the spine.  What are the signs or symptoms?  Symptoms of this condition include:  Needing to pee right away (urgently).  Peeing often.  Peeing small amounts often.  Pain or burning when peeing.  Blood in the pee.  Pee that smells bad or not like normal.  Trouble peeing.  Pee that is cloudy.  Fluid coming from the vagina, if you are female.  Pain in the belly or lower back.  Other symptoms include:  Throwing up (vomiting).  No urge to eat.  Feeling mixed up (confused).  Being tired and grouchy (irritable).  A fever.  Watery poop (diarrhea).  How is this  treated?  This condition may be treated with:  Antibiotic medicine.  Other medicines.  Drinking enough water.  Follow these instructions at home:    Medicines  Take over-the-counter and prescription medicines only as told by your doctor.  If you were prescribed an antibiotic medicine, take it as told by your doctor. Do not stop taking it even if you start to feel better.  General instructions  Make sure you:  Pee until your bladder is empty.  Do not hold pee for a long time.  Empty your bladder after sex.  Wipe from front to back after pooping if you are a female. Use each tissue one time when you wipe.  Drink enough fluid to keep your pee pale yellow.  Keep all follow-up visits as told by your doctor. This is important.  Contact a doctor if:  You do not get better after 1-2 days.  Your symptoms go away and then come back.  Get help right away if:  You have very bad back pain.  You have very bad pain in your lower belly.  You have a fever.  You are sick to your stomach (nauseous).  You are throwing up.  Summary  A urinary tract infection (UTI) is an infection of any part of the urinary tract.  This condition is caused by germs in your genital area.  There are many risk factors for a UTI. These include having a small, thin tube to drain pee and not being able to control when you pee or poop.  Treatment includes antibiotic medicines for germs.  Drink enough fluid to keep your pee pale yellow.  This information is not intended to replace advice given to you by your health care provider. Make sure you discuss any questions you have with your health care provider.  Document Released: 06/05/2009 Document Revised: 12/05/2019 Document Reviewed: 06/27/2019  [employer] Patient Education © 2020 [employer]

## 2022-08-15 DIAGNOSIS — R30.0 DYSURIA: ICD-10-CM

## 2022-08-15 DIAGNOSIS — R39.15 URINARY URGENCY: ICD-10-CM

## 2022-08-15 DIAGNOSIS — R35.0 URINARY FREQUENCY: ICD-10-CM

## 2022-08-17 LAB
BACTERIA UR CULT: NORMAL
SIGNIFICANT IND 70042: NORMAL
SITE SITE: NORMAL
SOURCE SOURCE: NORMAL

## 2022-08-31 ENCOUNTER — TELEMEDICINE (OUTPATIENT)
Dept: BEHAVIORAL HEALTH | Facility: CLINIC | Age: 42
End: 2022-08-31
Payer: COMMERCIAL

## 2022-08-31 DIAGNOSIS — F41.1 GENERALIZED ANXIETY DISORDER: ICD-10-CM

## 2022-08-31 DIAGNOSIS — F60.5 OBSESSIVE COMPULSIVE PERSONALITY DISORDER (HCC): ICD-10-CM

## 2022-08-31 PROCEDURE — 90837 PSYTX W PT 60 MINUTES: CPT | Mod: 95 | Performed by: SOCIAL WORKER

## 2022-08-31 NOTE — PROGRESS NOTES
"[facility]   Therapy Progress Note    This visit was conducted via Zoom using secure and encrypted videoconferencing technology.  The patient was in her home in the [facility].  The patient's identity was confirmed and verbal consent was obtained for this virtual visit.    Name: Felisha Pedraza  MRN: [id number]  : 1980  Age: 42 y.o.  Date of assessment: 2022  PCP: Huyen Guzman P.A.-C..  Persons in attendance: Patient  Total session time: 55 minutes    Topics addressed in psychotherapy include: Pt to [employer] [facility] [employer] video appt. Mood happy. Anxiety improved, husb and principal complimenting pt for being more \"relaxed\", partially due to principal and pt on better terms; Feels she is not dwelling on things, able to compartmentize more efficiently. Psychoedu on ways to address anticipatory anxiety in future.  Had play date over with no OCD issues. Plan: see monthly.     Objective Observations:   Participation:Active verbal participation, Engaged, and Open to feedback   Grooming:Casual   Cognition:Fully Oriented   Eye Contact:Good   Mood:Euthymic   Affect:Flexible and Congruent with content   Thought Process:Logical   Speech:Rate within normal limits and Volume within normal limits    Current Risk:   Suicide: NA   Homicide: NA   Self-Harm: NA   Relapse: NA   Safety Plan Reviewed: no    Care Plan Updated: No    Does patient express agreement with the above plan? Yes     Diagnosis:  KEVIN    Therapeutic Intervention(s): Stressors assessed    Treatment Goal(s)/Objective(s) addressed: anxiety     Progress toward Treatment Goals: Moderate improvement    Referral appointment(s) scheduled? No       Shasha Toro L.C.S.W.    "

## 2022-09-14 ENCOUNTER — TELEMEDICINE (OUTPATIENT)
Dept: BEHAVIORAL HEALTH | Facility: CLINIC | Age: 42
End: 2022-09-14
Payer: COMMERCIAL

## 2022-09-14 DIAGNOSIS — F41.1 GENERALIZED ANXIETY DISORDER: ICD-10-CM

## 2022-09-14 DIAGNOSIS — F60.5 OBSESSIVE COMPULSIVE PERSONALITY DISORDER (HCC): ICD-10-CM

## 2022-09-14 PROCEDURE — 90837 PSYTX W PT 60 MINUTES: CPT | Mod: GT | Performed by: SOCIAL WORKER

## 2022-09-14 NOTE — PROGRESS NOTES
[facility]   Therapy Progress Note    This visit was conducted via Zoom using secure and encrypted videoconferencing technology.  The patient was in her home in the [facility].  The patient's identity was confirmed and verbal consent was obtained for this virtual visit.    Name: Felisha Pedraza  MRN: [id number]  : 1980  Age: 42 y.o.  Date of assessment: 2022  PCP: Huyen Guzman P.A.-C..  Persons in attendance: Patient  Total session time: 55 minutes    Topics addressed in psychotherapy include: Pt to indiv video appt. Mood, stressed, speech pressurred. Pt lowered medication from 75 mg to 37.5 due to weight gain. Not relaxing, focus on cleaning, getting things done, missing out on people/connection/relationship opportunities. Pt having difficulty setting aside time for self  reflection, self care. Will discuss with psy prior to next med appt.   Plan: see biweekly.     Objective Observations:   Participation:Active verbal participation, Engaged, and Open to feedback   Grooming:Casual   Cognition:Fully Oriented   Eye Contact:Good   Mood:Anxious   Affect:Anxious   Thought Process:Goal-directed   Speech:Pressured    Current Risk:   Suicide: low   Homicide: NA   Self-Harm: NA   Relapse: low   Safety Plan Reviewed: no    Care Plan Updated: No    Does patient express agreement with the above plan? Yes     Diagnosis:  No diagnosis found.    Therapeutic Intervention(s): Stressors assessed    Treatment Goal(s)/Objective(s) addressed: anxiety, stress     Progress toward Treatment Goals: Mild decline    Referral appointment(s) scheduled? Yes, psy        Shasha Toro L.C.S.W.

## 2022-09-23 ENCOUNTER — TELEMEDICINE (OUTPATIENT)
Dept: BEHAVIORAL HEALTH | Facility: CLINIC | Age: 42
End: 2022-09-23
Payer: COMMERCIAL

## 2022-09-23 DIAGNOSIS — F41.1 GENERALIZED ANXIETY DISORDER: ICD-10-CM

## 2022-09-23 DIAGNOSIS — F41.9 INSOMNIA SECONDARY TO ANXIETY: ICD-10-CM

## 2022-09-23 DIAGNOSIS — F51.05 INSOMNIA SECONDARY TO ANXIETY: ICD-10-CM

## 2022-09-23 DIAGNOSIS — F60.5 OBSESSIVE COMPULSIVE PERSONALITY DISORDER (HCC): ICD-10-CM

## 2022-09-23 PROCEDURE — 99214 OFFICE O/P EST MOD 30 MIN: CPT | Mod: GT | Performed by: PSYCHIATRY & NEUROLOGY

## 2022-09-23 RX ORDER — ACYCLOVIR 400 MG/1
TABLET ORAL
COMMUNITY

## 2022-09-23 RX ORDER — ALPRAZOLAM 0.5 MG/1
TABLET ORAL
COMMUNITY

## 2022-09-23 NOTE — PROGRESS NOTES
PSYCHIATRY VIRTUAL VISIT FOLLOW-UP NOTE      Chief Complaint   Patient presents with    Anxiety     This evaluation was conducted via Zoom using secure and encrypted videoconferencing technology.   The patient was in a private location outside of their home in the [facility].    The patient's identity was confirmed and verbal consent was obtained for this virtual visit.    History Of Present Illness:  Felisha Pedraza is a 42 y.o. female with generalized anxiety disorder, obsessive compulsive personality disorder, migraines, PCOS comes in today for follow up, was last seen over 1 year ago in 7/2022.  She continues to have struggles with anxiety.  She took Zoloft for a little bit but discontinued and she started having hair loss.  She then saw her PCP who started her on Effexor.  She has been on Effexor for about 9 months, took 75 mg which was efficacious but she noticed weight gain and dropped down her dose to 37.5 mg recently.  She has noticed that her anxiety is not as well controlled on 37.5 mg as it was on 75 mg.  She is having struggles with irritability, fatigue secondary to anxiety.  She denies recent struggles with depression.  She is not entirely happy with her career and often thinks about a career change.  She denies having thoughts of wanting to hurt herself.    Social History:   She is , 2 daughters, , lives with family in [place].    Substance Use:  Alcohol - 1 tequila shot few nights a week   nicotine - Denies  Cannabis - Denies  Illicit drugs - Denies    Past Medication Trials:  Zoloft (effective, s/e - hair loss)    Medications:  Current Outpatient Medications   Medication Sig Dispense Refill    venlafaxine XR (EFFEXOR XR) 75 MG CAPSULE SR 24 HR       norgestimate-ethinyl estradiol (SPRINTEC 28) 0.25-35 MG-MCG per tablet Take 1 Tablet by mouth every day. 28 Tablet 1    venlafaxine XR (EFFEXOR XR) 37.5 MG CAPSULE SR 24 HR TAKE ONE CAPSULE BY MOUTH ONCE DAILY (Patient  "not taking: Reported on 8/14/2022) 30 Capsule 3    metFORMIN ER (GLUCOPHAGE XR) 500 MG TABLET SR 24 HR Take 1 Tablet by mouth every day. 90 Tablet 3    Bimatoprost 0.03 % Solution USE APPLICATION TO APPLY TO EYELASH BASE ONCE A DAY, MAKES LASHES GROW LONGER      tretinoin (RETIN-A) 0.025 % cream APPLY A PEA SIZED AMOUNT TO AFFECTED AREA(S) AT BEDTIME AS DIRECTED      ibuprofen (MOTRIN) 800 MG Tab Take 1 tablet by mouth every 8 hours as needed for Moderate Pain or Headache. 30 tablet 1    doxylamine (UNISOM SLEEPTABS) 25 MG Tab tablet Take 25 mg by mouth every bedtime.      MAGNESIUM PO Take  by mouth.      Coenzyme Q10 (COQ-10 PO) Take  by mouth.       No current facility-administered medications for this visit.       Review Of Systems:    Constitutional - Positive for fatigue  Psychiatric - Positive for anxiety    Physical Examination:  Vital signs: There were no vitals taken for this visit.    Musculoskeletal: No abnormal movements.     Mental Status Evaluation:   General: Young white female, dressed in casual attire, good grooming and hygiene, in no apparent distress, calm and cooperative, good eye contact, no psychomotor agitation or retardation  Orientation: Alert and oriented to person, place and time  Recent and remote memory: Grossly intact  Attention span and concentration: Grossly intact  Speech: Spontaneous, normal rate, rhythm and tone  Thought Process: Linear, logical and goal directed  Thought Content: Denies suicidal or homicidal ideations, intent or plan  Perception: No delusions noted  Associations: Intact  Language: Appropriate  Fund of knowledge and vocabulary: Grossly adequate  Mood: \"alright\"  Affect: Anxious, mood congruent  Insight: Good  Judgment: Good    Depression screening:  Depression Screen (PHQ-2/PHQ-9) 3/17/2020 7/6/2021 6/13/2022   PHQ-2 Total Score - - -   PHQ-2 Total Score 0 0 0   PHQ-9 Total Score - - -     Interpretation of PHQ-9 Total Score   Score Severity   1-4 No Depression "   5-9 Mild Depression   10-14 Moderate Depression   15-19 Moderately Severe Depression   20-27 Severe Depression    Anxiety screening:  KEVIN 7 6/23/2021   KEVIN-7 Total Score 11     Interpretation of KEVIN 7 Total Score   Score Severity:  0-4 No Anxiety   5-9 Mild Anxiety  10-14 Moderate Anxiety  15-21 Severe Anxiety    Medical Records/Labs/Diagnostic Tests Reviewed:  [place] PDMP records - appropriate refills, no abuse suspected       Impression:  1.  Generalized anxiety disorder - stable  2.  Obsessive-compulsive personality disorder - stable  3.  Insomnia secondary to anxiety - stable    Plan:  1.  Continue Effexor XR 37.5 mg daily for anxiety.  I let her know that weight gain is not a prominent side effect from Effexor but given her level of anxiety she would benefit from higher dose.  She also talked about a trial of Prozac which is reasonable as well.  Discussed that there is no goal standard medication for management of anxiety.  She would like to think more about continuing Effexor at a higher dose or transitioning to Prozac.  She will let me know through [facility] if she does decide transitioning to Prozac.  2.  Continue Xanax 0.5 mg daily as needed for anxiety/sleep, prescribed by PCP.  3.  Continue OTC Unisom at bedtime for sleep.  4.  Continue individual psychotherapy with Shasha Toro, LCSW, MSW  5.  I let her know that in my opinion she has obsessive-compulsive personality disorder for which psychotherapy is more beneficial  6.  Discussed the ketamine has been approved for severe depression and has efficacy for suicidal ideations but has not been asked to do much for anxiety support.  Discussed none to minimal evidence in regards to efficacy from cannabis and mushroom micro dosing.    Return to clinic in 6 weeks or sooner if symptoms worsen    The proposed treatment plan was discussed with the patient who was provided the opportunity to ask questions and make suggestions regarding alternative treatment. Patient  verbalized understanding and expressed agreement with the plan.     Meryl Patel M.D.  09/23/22    This note was created using voice recognition software (Dragon). The accuracy of the dictation is limited by the abilities of the software. I have reviewed the note prior to signing, however some errors in grammar and context are still possible. If you have any questions related to this note please do not hesitate to contact our office.

## 2022-09-28 ENCOUNTER — GYNECOLOGY VISIT (OUTPATIENT)
Dept: OBGYN | Facility: CLINIC | Age: 42
End: 2022-09-28
Payer: COMMERCIAL

## 2022-09-28 VITALS — WEIGHT: 108 LBS | SYSTOLIC BLOOD PRESSURE: 117 MMHG | BODY MASS INDEX: 20.41 KG/M2 | DIASTOLIC BLOOD PRESSURE: 70 MMHG

## 2022-09-28 DIAGNOSIS — Z01.419 WOMEN'S ANNUAL ROUTINE GYNECOLOGICAL EXAMINATION: ICD-10-CM

## 2022-09-28 DIAGNOSIS — E28.2 PCOS (POLYCYSTIC OVARIAN SYNDROME): ICD-10-CM

## 2022-09-28 DIAGNOSIS — L67.8 ABNORMAL HAIR PATTERN: ICD-10-CM

## 2022-09-28 PROCEDURE — 99396 PREV VISIT EST AGE 40-64: CPT | Performed by: OBSTETRICS & GYNECOLOGY

## 2022-09-28 RX ORDER — NORGESTIMATE AND ETHINYL ESTRADIOL 0.25-0.035
1 KIT ORAL DAILY
Qty: 84 TABLET | Refills: 3 | Status: SHIPPED | OUTPATIENT
Start: 2022-09-28 | End: 2023-09-12

## 2022-09-28 ASSESSMENT — FIBROSIS 4 INDEX: FIB4 SCORE: 1.08

## 2022-09-28 NOTE — PROGRESS NOTES
Annual examination;  This patient is a 42 y.o. female  using Sprintec for birth control.    No baseline mammogram as yet2    /70   Wt 49 kg (108 lb)   LMP 2022 (Approximate)   BMI 20.41 kg/m²     Physical examination;  Breast examination- No dominant masses, No skin retraction, No axillary adenopathy    Pelvic examination;  External genitalia-No visible lesions, urethra normal in appearance, [place]'s glands normal  Vagina-No blood or discharge, bladder normal in position without gross lesions  Cervix-No gross lesions, Pap smear taken  Uterus-Normal size and shape,  No tenderness  Adnexa unable to adequately examine left adnexa due to voluntary guarding    Abdomen-nondistended positive bowel sounds soft nontender without masses or hepatosplenomegaly    Impression;  Normal annual    Plan;  Transvaginal ultrasound  Sprintec for BC   Check PAP  Mammogram ordered    Female chaperone present during entire history and physical examination

## 2022-11-03 ENCOUNTER — APPOINTMENT (OUTPATIENT)
Dept: BEHAVIORAL HEALTH | Facility: CLINIC | Age: 42
End: 2022-11-03
Payer: COMMERCIAL

## 2022-11-09 ENCOUNTER — HOSPITAL ENCOUNTER (OUTPATIENT)
Dept: RADIOLOGY | Facility: MEDICAL CENTER | Age: 42
End: 2022-11-09
Attending: OBSTETRICS & GYNECOLOGY
Payer: COMMERCIAL

## 2022-11-09 DIAGNOSIS — Z01.419 WOMEN'S ANNUAL ROUTINE GYNECOLOGICAL EXAMINATION: ICD-10-CM

## 2022-11-09 PROCEDURE — 76830 TRANSVAGINAL US NON-OB: CPT

## 2022-11-09 PROCEDURE — 77063 BREAST TOMOSYNTHESIS BI: CPT

## 2022-12-27 ENCOUNTER — TELEMEDICINE (OUTPATIENT)
Dept: BEHAVIORAL HEALTH | Facility: CLINIC | Age: 42
End: 2022-12-27
Payer: COMMERCIAL

## 2022-12-27 DIAGNOSIS — F41.1 GENERALIZED ANXIETY DISORDER: ICD-10-CM

## 2022-12-27 PROCEDURE — 90837 PSYTX W PT 60 MINUTES: CPT | Mod: GT | Performed by: SOCIAL WORKER

## 2022-12-27 NOTE — PROGRESS NOTES
[facility]   Therapy Progress Note    This visit was conducted via Zoom using secure and encrypted videoconferencing technology.  The patient was in his/her home in the [facility].  The patient's identity was confirmed and verbal consent was obtained for this virtual visit.    Name: Felisha Pedraza  MRN: [id number]  : 1980  Age: 42 y.o.  Date of assessment: 2022  PCP: Huyen Guzman P.A.-C..  Persons in attendance: Patient  Total session time: 55 minutes    Topics addressed in psychotherapy include: Pt to [employer] video appt. Mood less anxious since being on winter break, got break from kids for 4 days with in laws. Pt and writer processed work situation, decision making. She feels decision was good and warranted and got support/feedback from [facility]. 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 Plan: see monthly.     Objective Observations:   Participation:Active verbal participation, Attentive, and Open to feedback   Grooming:Casual   Cognition:Fully Oriented   Eye Contact:Good   Mood:Euthymic   Affect:Flexible and Congruent with content   Thought Process:Goal-directed   Speech:Rate within normal limits and Volume within normal limits    Current Risk:   Suicide: NA   Homicide: NA   Self-Harm: NA   Relapse: NA   Safety Plan Reviewed: no    Care Plan Updated: No    Does patient express agreement with the above plan? Yes     Diagnosis:  KEVIN    Therapeutic Intervention(s): Communication skills, Interpersonal effectiveness skills, and Problem-solving    Treatment Goal(s)/Objective(s) addressed: anxiety     Progress toward Treatment Goals: Significant improvement    Referral appointment(s) scheduled? No       Shasha Toro L.C.S.W.

## 2023-03-10 ENCOUNTER — HOSPITAL ENCOUNTER (OUTPATIENT)
Dept: LAB | Facility: MEDICAL CENTER | Age: 43
End: 2023-03-10
Attending: INTERNAL MEDICINE
Payer: COMMERCIAL

## 2023-03-10 LAB
ALBUMIN SERPL BCP-MCNC: 4.4 G/DL (ref 3.2–4.9)
ALT SERPL-CCNC: 13 U/L (ref 2–50)
AST SERPL-CCNC: 14 U/L (ref 12–45)
BASOPHILS # BLD AUTO: 2.8 % (ref 0–1.8)
BASOPHILS # BLD: 0.12 K/UL (ref 0–0.12)
CREAT SERPL-MCNC: 0.64 MG/DL (ref 0.5–1.4)
CRP SERPL HS-MCNC: <0.3 MG/DL (ref 0–0.75)
EOSINOPHIL # BLD AUTO: 0.17 K/UL (ref 0–0.51)
EOSINOPHIL NFR BLD: 3.9 % (ref 0–6.9)
ERYTHROCYTE [DISTWIDTH] IN BLOOD BY AUTOMATED COUNT: 41.9 FL (ref 35.9–50)
ERYTHROCYTE [SEDIMENTATION RATE] IN BLOOD BY WESTERGREN METHOD: 4 MM/HOUR (ref 0–25)
GFR SERPLBLD CREATININE-BSD FMLA CKD-EPI: 114 ML/MIN/1.73 M 2
HCT VFR BLD AUTO: 39.5 % (ref 37–47)
HGB BLD-MCNC: 13 G/DL (ref 12–16)
IMM GRANULOCYTES # BLD AUTO: 0.01 K/UL (ref 0–0.11)
IMM GRANULOCYTES NFR BLD AUTO: 0.2 % (ref 0–0.9)
LYMPHOCYTES # BLD AUTO: 0.96 K/UL (ref 1–4.8)
LYMPHOCYTES NFR BLD: 22.3 % (ref 22–41)
MCH RBC QN AUTO: 28.8 PG (ref 27–33)
MCHC RBC AUTO-ENTMCNC: 32.9 G/DL (ref 33.6–35)
MCV RBC AUTO: 87.6 FL (ref 81.4–97.8)
MONOCYTES # BLD AUTO: 0.54 K/UL (ref 0–0.85)
MONOCYTES NFR BLD AUTO: 12.5 % (ref 0–13.4)
NEUTROPHILS # BLD AUTO: 2.51 K/UL (ref 2–7.15)
NEUTROPHILS NFR BLD: 58.3 % (ref 44–72)
NRBC # BLD AUTO: 0 K/UL
NRBC BLD-RTO: 0 /100 WBC
PLATELET # BLD AUTO: 161 K/UL (ref 164–446)
PMV BLD AUTO: 14.1 FL (ref 9–12.9)
RBC # BLD AUTO: 4.51 M/UL (ref 4.2–5.4)
TSH SERPL DL<=0.005 MIU/L-ACNC: 1.49 UIU/ML (ref 0.38–5.33)
WBC # BLD AUTO: 4.3 K/UL (ref 4.8–10.8)

## 2023-03-10 PROCEDURE — 84443 ASSAY THYROID STIM HORMONE: CPT

## 2023-03-10 PROCEDURE — 84450 TRANSFERASE (AST) (SGOT): CPT

## 2023-03-10 PROCEDURE — 82565 ASSAY OF CREATININE: CPT

## 2023-03-10 PROCEDURE — 84460 ALANINE AMINO (ALT) (SGPT): CPT

## 2023-03-10 PROCEDURE — 36415 COLL VENOUS BLD VENIPUNCTURE: CPT

## 2023-03-10 PROCEDURE — 85652 RBC SED RATE AUTOMATED: CPT

## 2023-03-10 PROCEDURE — 86140 C-REACTIVE PROTEIN: CPT

## 2023-03-10 PROCEDURE — 82040 ASSAY OF SERUM ALBUMIN: CPT

## 2023-03-10 PROCEDURE — 85025 COMPLETE CBC W/AUTO DIFF WBC: CPT

## 2023-03-27 ENCOUNTER — TELEMEDICINE (OUTPATIENT)
Dept: BEHAVIORAL HEALTH | Facility: CLINIC | Age: 43
End: 2023-03-27
Payer: COMMERCIAL

## 2023-03-27 DIAGNOSIS — F41.1 GENERALIZED ANXIETY DISORDER: ICD-10-CM

## 2023-03-27 PROCEDURE — 90837 PSYTX W PT 60 MINUTES: CPT | Mod: GT | Performed by: SOCIAL WORKER

## 2023-03-27 NOTE — PROGRESS NOTES
[facility]   psychotherapy Progress Note    This visit was conducted via Zoom using secure and encrypted videoconferencing technology.  The patient was in her home in the [facility].  The patient's identity was confirmed and verbal consent was obtained for this virtual visit.    Name: Felisha Pedraza  MRN: [id number]  : 1980  Age: 42 y.o.  Date of assessment: 3/27/2023  PCP: Huyen Guzman P.A.-C..  Persons in attendance: Patient  Total session time: 55 minutes    Topics addressed in psychotherapy include: Pt  to video indiv appt. 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 Pt handling challenges, communicating with leadership. Updated resume. Made changes in diet, seeing rheumatology, is beginning to attend yoga, taking breaks from kids for 1 hr in between school/home. Explored emotional stress/physical illness, importance of self care.    Family will travel to see extended family for passover. Plan: refer to community therapist.     Objective Observations:   Participation:Active verbal participation, Engaged, and Open to feedback   Grooming:Neat   Cognition:Fully Oriented   Eye Contact:Good   Mood:Anxious   Affect:Congruent with content   Thought Process:Goal-directed   Speech:Rate within normal limits and Volume within normal limits    Current Risk:   Suicide: NA   Homicide: NA   Self-Harm: NA   Relapse: NA   Safety Plan Reviewed: no    Care Plan Updated: No    Does patient express agreement with the above plan? Yes     Diagnosis:  KEVIN    Therapeutic Intervention(s): Communication skills, Distress tolerance skills, and Stressors assessed    Treatment Goal(s)/Objective(s) addressed: stress home/work     Progress toward Treatment Goals: Moderate improvement    Referral appointment(s) scheduled? No       Shasha Toro L.C.S.W.

## 2023-04-11 ENCOUNTER — APPOINTMENT (OUTPATIENT)
Dept: MEDICAL GROUP | Facility: MEDICAL CENTER | Age: 43
End: 2023-04-11
Payer: COMMERCIAL

## 2023-04-21 ENCOUNTER — TELEMEDICINE (OUTPATIENT)
Dept: MEDICAL GROUP | Facility: MEDICAL CENTER | Age: 43
End: 2023-04-21
Payer: COMMERCIAL

## 2023-04-21 VITALS — HEIGHT: 61 IN | BODY MASS INDEX: 20.39 KG/M2 | WEIGHT: 108 LBS

## 2023-04-21 DIAGNOSIS — M05.9 RHEUMATOID ARTHRITIS WITH POSITIVE RHEUMATOID FACTOR, INVOLVING UNSPECIFIED SITE (HCC): ICD-10-CM

## 2023-04-21 DIAGNOSIS — F60.5 OBSESSIVE COMPULSIVE PERSONALITY DISORDER (HCC): ICD-10-CM

## 2023-04-21 DIAGNOSIS — F41.1 GENERALIZED ANXIETY DISORDER: ICD-10-CM

## 2023-04-21 PROCEDURE — 99214 OFFICE O/P EST MOD 30 MIN: CPT | Mod: 95 | Performed by: STUDENT IN AN ORGANIZED HEALTH CARE EDUCATION/TRAINING PROGRAM

## 2023-04-21 RX ORDER — BUPRENORPHINE AND NALOXONE 8; 2 MG/1; MG/1
FILM, SOLUBLE BUCCAL; SUBLINGUAL
COMMUNITY
Start: 2023-04-09

## 2023-04-21 RX ORDER — VENLAFAXINE HYDROCHLORIDE 37.5 MG/1
37.5 CAPSULE, EXTENDED RELEASE ORAL
Qty: 90 CAPSULE | Refills: 3 | Status: SHIPPED | OUTPATIENT
Start: 2023-04-21

## 2023-04-21 RX ORDER — HYDROXYCHLOROQUINE SULFATE 200 MG/1
TABLET, FILM COATED ORAL
COMMUNITY
Start: 2023-04-13

## 2023-04-21 ASSESSMENT — PATIENT HEALTH QUESTIONNAIRE - PHQ9: CLINICAL INTERPRETATION OF PHQ2 SCORE: 0

## 2023-04-21 ASSESSMENT — FIBROSIS 4 INDEX: FIB4 SCORE: 0.96

## 2023-04-21 NOTE — PROGRESS NOTES
"Virtual Visit: Established Patient   This visit was conducted via Zoom using secure and encrypted videoconferencing technology.   The patient was in their home in the [facility].    The patient's identity was confirmed and verbal consent was obtained for this virtual visit.     Subjective:   CC:   Chief Complaint   Patient presents with    Medication Refill     Effexor       Felisha Pedraza is a 42 y.o. female presenting for evaluation and management of:    Anxiety  Patient following with behavioral health for anxiety and OCD.  Patient has been on Effexor for approximately the last year.  Patient Effexor was increased from 37.5 to 75 mg at last visit.  Patient notes better relief of anxiety and depression while on the higher dose but was concerned about weight loss.  Patient has been taking 37.5 mg dose  But is unsure if this is helpful.  Patient continues to note uncontrolled depression and anxiety.  Patient continues to follow with therapist.  Patient was seen by psychiatrist who noted that anxiety was uncontrolled and recommended increased dose.    Rheumatoid arthritis  Patient notes that she has been diagnosed with rheumatoid arthritis.  Patient was started on buprenorphine -Naloxone for pain.  Patient notes that she is getting this prescribed by an online provider.  States that she does see a rheumatologist every 4 months.  Patient is also seeing holistic medicine for acupuncture.      Today's visit was complicated by being a virtual and poor service/connection.    ROS   See hpi       Objective:   Ht 1.549 m (5' 1\")   Wt 49 kg (108 lb)   BMI 20.41 kg/m²     Physical Exam:  Constitutional: Alert, no distress, well-groomed.  Skin: No rashes in visible areas.  Eye: Round. Conjunctiva clear, lids normal. No icterus.   ENMT: Lips pink without lesions, good dentition, moist mucous membranes. Phonation normal.  Neck: No masses, no thyromegaly. Moves freely without pain.  Respiratory: Unlabored respiratory " effort, no cough or audible wheeze  Psych: Alert and oriented x3, normal affect and mood.     Assessment and Plan:   The following treatment plan was discussed:     1. Obsessive compulsive personality disorder (HCC)  2. KEVIN (generalized anxiety disorder)  Acute, stable.  We will continue patient on venlafaxine 37.5 mg at this time.  Patient is interested in coming off the medication at the end of the school year.  Patient continues to have uncontrolled anxiety and depression, recommend continuing on the medication.  Advised to follow-up with psychiatrist.  - venlafaxine XR (EFFEXOR XR) 37.5 MG CAPSULE SR 24 HR; Take 1 Capsule by mouth every day.  Dispense: 90 Capsule; Refill: 3    3. Rheumatoid arthritis with positive rheumatoid factor, involving unspecified site (HCC)    4. Generalized anxiety disorder  - venlafaxine XR (EFFEXOR XR) 37.5 MG CAPSULE SR 24 HR; Take 1 Capsule by mouth every day.  Dispense: 90 Capsule; Refill: 3    Other orders  - hydroxychloroquine (PLAQUENIL) 200 MG Tab  - Buprenorphine HCl-Naloxone HCl 8-2 MG FILM; DISSOLVE ONE-HALF FILM UNDER THE TONGUE EVERY DAY      Follow-up: Return in about 6 months (around 10/21/2023).

## 2023-08-07 ENCOUNTER — GYNECOLOGY VISIT (OUTPATIENT)
Dept: OBGYN | Facility: CLINIC | Age: 43
End: 2023-08-07
Payer: COMMERCIAL

## 2023-08-07 VITALS — DIASTOLIC BLOOD PRESSURE: 78 MMHG | WEIGHT: 108 LBS | BODY MASS INDEX: 20.41 KG/M2 | SYSTOLIC BLOOD PRESSURE: 120 MMHG

## 2023-08-07 DIAGNOSIS — L65.9 ALOPECIA: ICD-10-CM

## 2023-08-07 DIAGNOSIS — Z30.09 FAMILY PLANNING: ICD-10-CM

## 2023-08-07 DIAGNOSIS — Z30.09 GENERAL COUNSELING AND ADVICE FOR CONTRACEPTIVE MANAGEMENT: ICD-10-CM

## 2023-08-07 PROCEDURE — 3074F SYST BP LT 130 MM HG: CPT | Performed by: OBSTETRICS & GYNECOLOGY

## 2023-08-07 PROCEDURE — 99213 OFFICE O/P EST LOW 20 MIN: CPT | Performed by: OBSTETRICS & GYNECOLOGY

## 2023-08-07 PROCEDURE — 3078F DIAST BP <80 MM HG: CPT | Performed by: OBSTETRICS & GYNECOLOGY

## 2023-08-07 RX ORDER — ACETAMINOPHEN AND CODEINE PHOSPHATE 120; 12 MG/5ML; MG/5ML
1 SOLUTION ORAL DAILY
Qty: 84 TABLET | Refills: 3 | Status: SHIPPED | OUTPATIENT
Start: 2023-08-07

## 2023-08-07 ASSESSMENT — FIBROSIS 4 INDEX: FIB4 SCORE: 0.96

## 2023-08-07 NOTE — PROGRESS NOTES
Chief complaint;    Felisha Pedraza is a 42 y.o.  who presents complaining of hair loss, some night sweats, having migraine headaches with aura.  Had previously been on combination oral contraceptives but discontinued due to migraine headaches with aura.  Recently discontinued SSRI and night sweats are better.  Does have a history of PCOS and currently on metformin  Patient is also currently on retinoic acid for acne.    Review of systems; denies fever chills abdominal pain, denies chest pain shortness of breath or urinary symptoms  Past medical history-  Past Medical History:   Diagnosis Date    Migraine     Other specified symptom associated with female genital organs     Panic disorder     PCOS (polycystic ovarian syndrome)      Past surgical history-  Past Surgical History:   Procedure Laterality Date    REPEAT C SECTION  2017    Procedure: REPEAT C SECTION;  Surgeon: Sara Guzman M.D.;  Location: LABOR AND DELIVERY;  Service: Labor and Delivery    PELVISCOPY  4/3/2013    Performed by Shin Mayo M.D. at SURGERY SAME DAY [facility] ORS    CYST EXCISION  4/3/2013    Performed by Shin Mayo M.D. at SURGERY SAME DAY [facility] ORS    OTHER ORTHOPEDIC SURGERY      clavical right     Allergies-Patient has no known allergies.  Medications-  Current Outpatient Medications on File Prior to Visit   Medication Sig Dispense Refill    metFORMIN ER (GLUCOPHAGE XR) 500 MG TABLET SR 24 HR TAKE ONE TABLET BY MOUTH EVERY DAY 90 Tablet 3    Buprenorphine HCl-Naloxone HCl 8-2 MG FILM DISSOLVE ONE-HALF FILM UNDER THE TONGUE EVERY DAY      ALPRAZolam (XANAX) 0.5 MG Tab alprazolam 0.5 mg tablet      Bimatoprost 0.03 % Solution USE APPLICATION TO APPLY TO EYELASH BASE ONCE A DAY, MAKES LASHES GROW LONGER      tretinoin (RETIN-A) 0.025 % cream APPLY A PEA SIZED AMOUNT TO AFFECTED AREA(S) AT BEDTIME AS DIRECTED      doxylamine (UNISOM SLEEPTABS) 25 MG Tab tablet Take 25 mg by mouth every bedtime.      MAGNESIUM PO  Take  by mouth.      Coenzyme Q10 (COQ-10 PO) Take  by mouth.      hydroxychloroquine (PLAQUENIL) 200 MG Tab       venlafaxine XR (EFFEXOR XR) 37.5 MG CAPSULE SR 24 HR Take 1 Capsule by mouth every day. 90 Capsule 3    norgestimate-ethinyl estradiol (SPRINTEC 28) 0.25-35 MG-MCG per tablet Take 1 Tablet by mouth every day. 84 Tablet 3    acyclovir (ZOVIRAX) 400 MG tablet acyclovir 400 mg tablet (Patient not taking: Reported on 8/7/2023)      ibuprofen (MOTRIN) 800 MG Tab Take 1 tablet by mouth every 8 hours as needed for Moderate Pain or Headache. 30 tablet 1     No current facility-administered medications on file prior to visit.     Social history-  Social History     Socioeconomic History    Marital status:      Spouse name: Not on file    Number of children: Not on file    Years of education: Not on file    Highest education level: Not on file   Occupational History    Not on file   Tobacco Use    Smoking status: Never    Smokeless tobacco: Never   Vaping Use    Vaping Use: Never used   Substance and Sexual Activity    Alcohol use: Yes     Comment: 1-2 drinks daily - wine or tequila     Drug use: Not Currently     Types: Marijuana    Sexual activity: Yes     Partners: Male     Birth control/protection: None   Other Topics Concern    Not on file   Social History Narrative    Not on file     Social Determinants of Health     Financial Resource Strain: Not on file   Food Insecurity: Not on file   Transportation Needs: Not on file   Physical Activity: Not on file   Stress: Not on file   Social Connections: Not on file   Intimate Partner Violence: Not on file   Housing Stability: Not on file     Past Family History-no history of breast or ovarian cancer    Physical examination;  Alert and oriented x3  General a thin well-developed well-nourished female in no apparent distress  Vitals:    08/07/23 1147   BP: 120/78   BP Location: Right arm   Patient Position: Sitting   BP Cuff Size: Small adult   Weight: 108 lb          Impression;  Family-planning  Contraceptive management  Alopecia    Plan;  DHEA-S, testosterone, FSH, LH, prolactin, thyroid function test  Patient would like to start on Micronor.  We discussed that Micronor is less effective than combination contraceptives and definitely much less effective than progesterone IUD-Mirena I do recommend Mirena IUD and the patient would like to think about it for now.  If the patient is on retinoic acid this causes birth defects and most effective birth control is recommended.  Prescription for Micronor sent to pharmacy        20  Minutes spent with the patient in face-to-face contact, 100% of the time spent on counseling and coordination of care. All questions answered in detail.    Female chaperone present for entire examination and history

## 2023-08-12 ENCOUNTER — HOSPITAL ENCOUNTER (OUTPATIENT)
Dept: LAB | Facility: MEDICAL CENTER | Age: 43
End: 2023-08-12
Attending: OBSTETRICS & GYNECOLOGY
Payer: COMMERCIAL

## 2023-08-12 ENCOUNTER — HOSPITAL ENCOUNTER (OUTPATIENT)
Dept: LAB | Facility: MEDICAL CENTER | Age: 43
End: 2023-08-12
Attending: STUDENT IN AN ORGANIZED HEALTH CARE EDUCATION/TRAINING PROGRAM
Payer: COMMERCIAL

## 2023-08-12 LAB
DHEA-S SERPL-MCNC: 61.2 UG/DL (ref 60.9–337)
ESTRADIOL SERPL-MCNC: 76.7 PG/ML
FERRITIN SERPL-MCNC: 35 NG/ML (ref 10–291)
FSH SERPL-ACNC: 8.4 MIU/ML
LH SERPL-ACNC: 20.2 IU/L
PROGEST SERPL-MCNC: 2.25 NG/ML
PROLACTIN SERPL-MCNC: 22.9 NG/ML (ref 2.8–26)
T4 FREE SERPL-MCNC: 1.28 NG/DL (ref 0.93–1.7)
T4 FREE SERPL-MCNC: 1.3 NG/DL (ref 0.93–1.7)
TESTOST SERPL-MCNC: 17 NG/DL (ref 9–75)
TSH SERPL DL<=0.005 MIU/L-ACNC: 2.31 UIU/ML (ref 0.38–5.33)
TSH SERPL DL<=0.005 MIU/L-ACNC: 2.38 UIU/ML (ref 0.38–5.33)

## 2023-08-12 PROCEDURE — 84443 ASSAY THYROID STIM HORMONE: CPT | Mod: 91

## 2023-08-12 PROCEDURE — 82728 ASSAY OF FERRITIN: CPT

## 2023-08-12 PROCEDURE — 84270 ASSAY OF SEX HORMONE GLOBUL: CPT

## 2023-08-12 PROCEDURE — 82627 DEHYDROEPIANDROSTERONE: CPT

## 2023-08-12 PROCEDURE — 84144 ASSAY OF PROGESTERONE: CPT

## 2023-08-12 PROCEDURE — 83002 ASSAY OF GONADOTROPIN (LH): CPT

## 2023-08-12 PROCEDURE — 84443 ASSAY THYROID STIM HORMONE: CPT

## 2023-08-12 PROCEDURE — 84403 ASSAY OF TOTAL TESTOSTERONE: CPT

## 2023-08-12 PROCEDURE — 84439 ASSAY OF FREE THYROXINE: CPT

## 2023-08-12 PROCEDURE — 36415 COLL VENOUS BLD VENIPUNCTURE: CPT

## 2023-08-12 PROCEDURE — 83001 ASSAY OF GONADOTROPIN (FSH): CPT

## 2023-08-12 PROCEDURE — 84146 ASSAY OF PROLACTIN: CPT

## 2023-08-12 PROCEDURE — 84439 ASSAY OF FREE THYROXINE: CPT | Mod: 91

## 2023-08-12 PROCEDURE — 82670 ASSAY OF TOTAL ESTRADIOL: CPT

## 2023-08-12 PROCEDURE — 84402 ASSAY OF FREE TESTOSTERONE: CPT

## 2023-08-16 LAB
SHBG SERPL-SCNC: 176 NMOL/L (ref 25–122)
TESTOST FREE SERPL-MCNC: 2 PG/ML (ref 1.1–5.8)
TESTOST SERPL-MCNC: 41 NG/DL (ref 9–55)

## 2023-08-17 LAB — T4 FREE SERPL DIALY-MCNC: 1.7 NG/DL (ref 1.1–2.4)

## 2023-09-09 DIAGNOSIS — E28.2 PCOS (POLYCYSTIC OVARIAN SYNDROME): ICD-10-CM

## 2023-09-09 DIAGNOSIS — L67.8 ABNORMAL HAIR PATTERN: ICD-10-CM

## 2024-01-08 ENCOUNTER — DOCUMENTATION (OUTPATIENT)
Dept: HEALTH INFORMATION MANAGEMENT | Facility: OTHER | Age: 44
End: 2024-01-08
Payer: COMMERCIAL

## 2024-02-16 ENCOUNTER — TELEPHONE (OUTPATIENT)
Dept: HEALTH INFORMATION MANAGEMENT | Facility: OTHER | Age: 44
End: 2024-02-16
Payer: COMMERCIAL

## 2024-03-29 ENCOUNTER — TELEMEDICINE (OUTPATIENT)
Dept: MEDICAL GROUP | Facility: MEDICAL CENTER | Age: 44
End: 2024-03-29
Payer: COMMERCIAL

## 2024-03-29 VITALS — WEIGHT: 106 LBS | HEIGHT: 61 IN | BODY MASS INDEX: 20.01 KG/M2

## 2024-03-29 DIAGNOSIS — F41.1 GENERALIZED ANXIETY DISORDER: Primary | ICD-10-CM

## 2024-03-29 DIAGNOSIS — M05.79 RHEUMATOID ARTHRITIS INVOLVING MULTIPLE SITES WITH POSITIVE RHEUMATOID FACTOR (HCC): ICD-10-CM

## 2024-03-29 DIAGNOSIS — Z00.00 HEALTHCARE MAINTENANCE: ICD-10-CM

## 2024-03-29 DIAGNOSIS — F41.9 INSOMNIA SECONDARY TO ANXIETY: ICD-10-CM

## 2024-03-29 DIAGNOSIS — E28.2 PCOS (POLYCYSTIC OVARIAN SYNDROME): ICD-10-CM

## 2024-03-29 DIAGNOSIS — K90.41 GLUTEN INTOLERANCE: ICD-10-CM

## 2024-03-29 DIAGNOSIS — F51.05 INSOMNIA SECONDARY TO ANXIETY: ICD-10-CM

## 2024-03-29 RX ORDER — TURMERIC 400 MG
CAPSULE ORAL
COMMUNITY

## 2024-03-29 RX ORDER — NITROFURANTOIN 25; 75 MG/1; MG/1
CAPSULE ORAL
COMMUNITY

## 2024-03-29 RX ORDER — SPIRONOLACTONE 25 MG/1
TABLET ORAL
COMMUNITY

## 2024-03-29 RX ORDER — METFORMIN HYDROCHLORIDE 500 MG/1
500 TABLET, EXTENDED RELEASE ORAL
Qty: 90 TABLET | Refills: 3 | Status: SHIPPED | OUTPATIENT
Start: 2024-03-29

## 2024-03-29 RX ORDER — PHENAZOPYRIDINE HYDROCHLORIDE 200 MG/1
TABLET, FILM COATED ORAL
COMMUNITY

## 2024-03-29 RX ORDER — ALPRAZOLAM 0.5 MG/1
0.25 TABLET ORAL NIGHTLY PRN
Qty: 15 TABLET | Refills: 0 | Status: SHIPPED | OUTPATIENT
Start: 2024-03-29 | End: 2024-04-28

## 2024-03-29 RX ORDER — NITROFURANTOIN MACROCRYSTALS 50 MG/1
CAPSULE ORAL
COMMUNITY

## 2024-03-29 ASSESSMENT — FIBROSIS 4 INDEX: FIB4 SCORE: 0.99

## 2024-03-29 ASSESSMENT — ENCOUNTER SYMPTOMS
PALPITATIONS: 0
BLOOD IN STOOL: 0
EYE REDNESS: 0
SINUS PAIN: 0
WHEEZING: 0
FEVER: 0
DIAPHORESIS: 0
NERVOUS/ANXIOUS: 1
SEIZURES: 0
EYE DISCHARGE: 0
SPEECH CHANGE: 0
ABDOMINAL PAIN: 0
EYE PAIN: 0
CHILLS: 0
BLURRED VISION: 0
STRIDOR: 0
SHORTNESS OF BREATH: 0
FOCAL WEAKNESS: 0
LOSS OF CONSCIOUSNESS: 0
FLANK PAIN: 0

## 2024-03-29 ASSESSMENT — PATIENT HEALTH QUESTIONNAIRE - PHQ9: CLINICAL INTERPRETATION OF PHQ2 SCORE: 0

## 2024-03-29 ASSESSMENT — LIFESTYLE VARIABLES: SUBSTANCE_ABUSE: 0

## 2024-03-29 NOTE — PROGRESS NOTES
Virtual Visit: Established Patient   This visit was conducted via Zoom using secure and encrypted videoconferencing technology.   The patient was in their home in the [facility].    The patient's identity was confirmed and verbal consent was obtained for this virtual visit.    Subjective:   CC:   Chief Complaint   Patient presents with    Establish Care    Medication Refill    Requesting Labs     Felisha Pedraza is a 43 y.o. female presenting for evaluation and management of:      Takes xanax 0.25 mg as needed for anxiety . Counseling and education for SSRI or SNRI  but given previous side effects , she is not interested in long term/daily medications.     Taking buprenorphine and naloxone 8-2 mg for arthritis pain in  joints-  prescribed by Dr. Andrey Buchanan.    Problem   Rheumatoid Arthritis Involving Multiple Sites With Positive Rheumatoid Factor (Hcc)    Chronic, stable   Previously on plaquenil but due to side effects, discontinued   Currently managed with lifestyle changes.  Following up with rheumatology - Dr. Chu      Gluten Intolerance    Chronic, stable   Well controlled with avoiding gluten in diet      Insomnia Secondary to Anxiety    Chronic , stable   Some times wakes up in middle of the night when she has panic episodes.   Takes 0.25 mg of xanax as needed which helps.      KEVIN (generalized anxiety disorder)    Chronic, stable   Patient with history of anxiety and OCD for which she has been taking Xanax as needed. Patient states she mostly uses the medication at night when she wakes up and is unable to return back to sleep. Patient states she typically uses about 10 tabs per month for severe anxiety. Patient states that she is using this medication appropriately and not with alcohol or other medications that could interact. Patient previously prescribed Zoloft and effexor  but continues to not want to take a daily medication due to side effects in past.  but patient is not interested at this  time. Patient states she recently started with a counselor 2 weeks ago and has only had one visit. Patient is advised to continue doing counseling.      Pcos (Polycystic Ovarian Syndrome)    Chronic, stable  On metformin 500 mg once daily              ROS   Review of Systems   Constitutional:  Negative for chills, diaphoresis and fever.   HENT:  Negative for congestion and sinus pain.    Eyes:  Negative for blurred vision, pain, discharge and redness.   Respiratory:  Negative for shortness of breath, wheezing and stridor.    Cardiovascular:  Negative for chest pain and palpitations.   Gastrointestinal:  Negative for abdominal pain and blood in stool.   Genitourinary:  Negative for flank pain and hematuria.   Neurological:  Negative for speech change, focal weakness, seizures and loss of consciousness.   Psychiatric/Behavioral:  Negative for substance abuse. The patient is nervous/anxious.          Current medicines (including changes today)  Current Outpatient Medications   Medication Sig Dispense Refill    ALPRAZolam (XANAX) 0.5 MG Tab Take 0.5 Tablets by mouth at bedtime as needed for Sleep for up to 30 days. 15 Tablet 0    metFORMIN ER (GLUCOPHAGE XR) 500 MG TABLET SR 24 HR Take 1 Tablet by mouth every day. 90 Tablet 3    spironolactone (ALDACTONE) 25 MG Tab       phenazopyridine (PYRIDIUM) 200 MG Tab TAKE 1 TABLET BY MOUTH 3 TIMES A DAY FOR 2 DAYS      nitrofurantoin (MACROBID) 100 MG Cap TAKE 1 CAPSULE BY MOUTH EVERY 12 HOURS FOR 5 DAYS      nitrofurantoin (MACRODANTIN) 50 MG Cap Take 1 capsule as needed by oral route.      Turmeric 400 MG Cap Turmeric      Magnesium Gluconate (MAGNESIUM 27 PO) Magnesium      Buprenorphine HCl-Naloxone HCl 8-2 MG FILM DISSOLVE ONE-HALF FILM UNDER THE TONGUE EVERY DAY      acyclovir (ZOVIRAX) 400 MG tablet acyclovir 400 mg tablet (Patient not taking: Reported on 8/7/2023)      Bimatoprost 0.03 % Solution USE APPLICATION TO APPLY TO EYELASH BASE ONCE A DAY, MAKES LASHES GROW  "LONGER      tretinoin (RETIN-A) 0.025 % cream APPLY A PEA SIZED AMOUNT TO AFFECTED AREA(S) AT BEDTIME AS DIRECTED      doxylamine (UNISOM SLEEPTABS) 25 MG Tab tablet Take 25 mg by mouth every bedtime.      MAGNESIUM PO Take  by mouth.      Coenzyme Q10 (COQ-10 PO) Take  by mouth.       No current facility-administered medications for this visit.       Patient Active Problem List    Diagnosis Date Noted    Rheumatoid arthritis involving multiple sites with positive rheumatoid factor (HCC) 03/29/2024    Gluten intolerance 03/29/2024    Insomnia secondary to anxiety 07/28/2021    Obsessive compulsive personality disorder (HCC) 10/05/2020    Migraine with aura and without status migrainosus, not intractable 04/02/2019    KEVIN (generalized anxiety disorder) 07/19/2018    HSV infection 04/25/2017    PCOS (polycystic ovarian syndrome) 07/18/2012        Objective:   Ht 1.549 m (5' 1\")   Wt 48.1 kg (106 lb)   LMP 02/27/2024   BMI 20.03 kg/m²     Physical Exam:  Constitutional: Alert, no distress, well-groomed.  Skin: No rashes in visible areas.  Eye: Round. Conjunctiva clear, lids normal. No icterus.   ENMT: Lips pink without lesions, good dentition, moist mucous membranes. Phonation normal.  Neck: No masses, no thyromegaly. Moves freely without pain.  Respiratory: Unlabored respiratory effort, no cough or audible wheeze  Psych: Alert and oriented x3, normal affect and mood.     Assessment and Plan:   The following treatment plan was discussed:     1. Rheumatoid arthritis involving multiple sites with positive rheumatoid factor (HCC)  Chronic, stable  Continue following up with rheumatology  Continue following up for pain management with your specialist  Patient currently on buprenorphine-naloxone combination for pain management for multiple joint pain.  I will defer this treatment to her outside providers    3. KEVIN (generalized anxiety disorder)  Chronic, stable  Counseling and education provided regarding SSRI or SNRI " for better management of her current symptoms.  Patient reports that she had side effects in the past and she is not interested in a daily long-term medication.  She takes Xanax 0.25 mg as needed and usually an average 10 pills last her a month.  She has been on Xanax for a while and has tolerated it well without any side effects.  Plan  Controlled substance discussed with client. Client agrees to abide by controlled substance contract.  PDMP reviewed . shows no abnormal prescribing or filling behavior.    The treatment plan was reviewed and discussed with the patient. The pharmacy monitoring report was requested and reviewed.  Patient is appropriate for refill of this medication.  -Patient informed no medication adjustments will be made to titrate up or add additional narcotics, benzodiazepines or other controlled substances to this regimen.  Referral to pain management or behavioral health will be made as appropriate.  Verbally discussed controlled substance agreement  Patient is recommended to make a follow-up appointment in a month  to go over her annual physical as well as controlled substance contract paperwork  - ALPRAZolam (XANAX) 0.5 MG Tab; Take 0.5 Tablets by mouth at bedtime as needed for Sleep for up to 30 days.  Dispense: 15 Tablet; Refill: 0    4. Insomnia secondary to anxiety  - ALPRAZolam (XANAX) 0.5 MG Tab; Take 0.5 Tablets by mouth at bedtime as needed for Sleep for up to 30 days.  Dispense: 15 Tablet; Refill: 0    5. PCOS (polycystic ovarian syndrome)  Chronic, stable  Patient is on metformin 500 mg once daily requesting for refill  Plan  - metFORMIN ER (GLUCOPHAGE XR) 500 MG TABLET SR 24 HR; Take 1 Tablet by mouth every day.  Dispense: 90 Tablet; Refill: 3      Follow-up: Return in about 3 months (around 6/29/2024) for preventive wellness.

## 2024-04-08 ENCOUNTER — HOSPITAL ENCOUNTER (OUTPATIENT)
Facility: MEDICAL CENTER | Age: 44
End: 2024-04-08
Attending: INTERNAL MEDICINE
Payer: COMMERCIAL

## 2024-04-08 ENCOUNTER — HOSPITAL ENCOUNTER (OUTPATIENT)
Facility: MEDICAL CENTER | Age: 44
End: 2024-04-08
Attending: STUDENT IN AN ORGANIZED HEALTH CARE EDUCATION/TRAINING PROGRAM
Payer: COMMERCIAL

## 2024-04-08 LAB
ALBUMIN SERPL BCP-MCNC: 4.8 G/DL (ref 3.2–4.9)
ALBUMIN/GLOB SERPL: 2 G/DL
ALP SERPL-CCNC: 72 U/L (ref 30–99)
ALT SERPL-CCNC: 20 U/L (ref 2–50)
ALT SERPL-CCNC: 21 U/L (ref 2–50)
AMBIGUOUS DTTM AMBI4: NORMAL
AMBIGUOUS DTTM AMBI4: NORMAL
ANION GAP SERPL CALC-SCNC: 13 MMOL/L (ref 7–16)
AST SERPL-CCNC: 26 U/L (ref 12–45)
AST SERPL-CCNC: 26 U/L (ref 12–45)
BASOPHILS # BLD AUTO: 3.5 % (ref 0–1.8)
BASOPHILS # BLD: 0.15 K/UL (ref 0–0.12)
BILIRUB SERPL-MCNC: 0.9 MG/DL (ref 0.1–1.5)
BUN SERPL-MCNC: 10 MG/DL (ref 8–22)
CALCIUM ALBUM COR SERPL-MCNC: 9.1 MG/DL (ref 8.5–10.5)
CALCIUM SERPL-MCNC: 9.7 MG/DL (ref 8.5–10.5)
CHLORIDE SERPL-SCNC: 101 MMOL/L (ref 96–112)
CHOLEST SERPL-MCNC: 209 MG/DL (ref 100–199)
CO2 SERPL-SCNC: 25 MMOL/L (ref 20–33)
CREAT SERPL-MCNC: 0.55 MG/DL (ref 0.5–1.4)
CRP SERPL HS-MCNC: <0.3 MG/DL (ref 0–0.75)
EOSINOPHIL # BLD AUTO: 0.26 K/UL (ref 0–0.51)
EOSINOPHIL NFR BLD: 6.1 % (ref 0–6.9)
ERYTHROCYTE [DISTWIDTH] IN BLOOD BY AUTOMATED COUNT: 40 FL (ref 35.9–50)
ERYTHROCYTE [DISTWIDTH] IN BLOOD BY AUTOMATED COUNT: 40.2 FL (ref 35.9–50)
ERYTHROCYTE [SEDIMENTATION RATE] IN BLOOD BY WESTERGREN METHOD: 4 MM/HOUR (ref 0–25)
GFR SERPLBLD CREATININE-BSD FMLA CKD-EPI: 118 ML/MIN/1.73 M 2
GLOBULIN SER CALC-MCNC: 2.4 G/DL (ref 1.9–3.5)
GLUCOSE SERPL-MCNC: 95 MG/DL (ref 65–99)
HCT VFR BLD AUTO: 43.5 % (ref 37–47)
HCT VFR BLD AUTO: 43.6 % (ref 37–47)
HDLC SERPL-MCNC: 76 MG/DL
HGB BLD-MCNC: 14.4 G/DL (ref 12–16)
HGB BLD-MCNC: 14.4 G/DL (ref 12–16)
IMM GRANULOCYTES # BLD AUTO: 0.01 K/UL (ref 0–0.11)
IMM GRANULOCYTES NFR BLD AUTO: 0.2 % (ref 0–0.9)
LDLC SERPL CALC-MCNC: 121 MG/DL
LYMPHOCYTES # BLD AUTO: 1.26 K/UL (ref 1–4.8)
LYMPHOCYTES NFR BLD: 29.4 % (ref 22–41)
MCH RBC QN AUTO: 28.9 PG (ref 27–33)
MCH RBC QN AUTO: 28.9 PG (ref 27–33)
MCHC RBC AUTO-ENTMCNC: 33 G/DL (ref 32.2–35.5)
MCHC RBC AUTO-ENTMCNC: 33.1 G/DL (ref 32.2–35.5)
MCV RBC AUTO: 87.2 FL (ref 81.4–97.8)
MCV RBC AUTO: 87.4 FL (ref 81.4–97.8)
MONOCYTES # BLD AUTO: 0.35 K/UL (ref 0–0.85)
MONOCYTES NFR BLD AUTO: 8.2 % (ref 0–13.4)
NEUTROPHILS # BLD AUTO: 2.26 K/UL (ref 1.82–7.42)
NEUTROPHILS NFR BLD: 52.6 % (ref 44–72)
NRBC # BLD AUTO: 0 K/UL
NRBC BLD-RTO: 0 /100 WBC (ref 0–0.2)
PLATELET # BLD AUTO: 210 K/UL (ref 164–446)
PLATELET # BLD AUTO: 212 K/UL (ref 164–446)
PMV BLD AUTO: 13.6 FL (ref 9–12.9)
PMV BLD AUTO: 13.7 FL (ref 9–12.9)
POTASSIUM SERPL-SCNC: 4 MMOL/L (ref 3.6–5.5)
PROT SERPL-MCNC: 7.2 G/DL (ref 6–8.2)
RBC # BLD AUTO: 4.99 M/UL (ref 4.2–5.4)
RBC # BLD AUTO: 4.99 M/UL (ref 4.2–5.4)
SODIUM SERPL-SCNC: 139 MMOL/L (ref 135–145)
TRIGL SERPL-MCNC: 58 MG/DL (ref 0–149)
TSH SERPL DL<=0.005 MIU/L-ACNC: 1.6 UIU/ML (ref 0.38–5.33)
WBC # BLD AUTO: 4.2 K/UL (ref 4.8–10.8)
WBC # BLD AUTO: 4.3 K/UL (ref 4.8–10.8)

## 2024-04-08 PROCEDURE — 84460 ALANINE AMINO (ALT) (SGPT): CPT

## 2024-04-08 PROCEDURE — 85652 RBC SED RATE AUTOMATED: CPT

## 2024-04-08 PROCEDURE — 80061 LIPID PANEL: CPT

## 2024-04-08 PROCEDURE — 80053 COMPREHEN METABOLIC PANEL: CPT

## 2024-04-08 PROCEDURE — 85025 COMPLETE CBC W/AUTO DIFF WBC: CPT

## 2024-04-08 PROCEDURE — 84443 ASSAY THYROID STIM HORMONE: CPT

## 2024-04-08 PROCEDURE — 85027 COMPLETE CBC AUTOMATED: CPT

## 2024-04-08 PROCEDURE — 86140 C-REACTIVE PROTEIN: CPT

## 2024-04-08 PROCEDURE — 84450 TRANSFERASE (AST) (SGOT): CPT

## 2024-04-09 ENCOUNTER — PATIENT MESSAGE (OUTPATIENT)
Dept: MEDICAL GROUP | Facility: MEDICAL CENTER | Age: 44
End: 2024-04-09
Payer: COMMERCIAL

## 2024-07-22 ENCOUNTER — HOSPITAL ENCOUNTER (OUTPATIENT)
Dept: RADIOLOGY | Facility: MEDICAL CENTER | Age: 44
End: 2024-07-22
Attending: STUDENT IN AN ORGANIZED HEALTH CARE EDUCATION/TRAINING PROGRAM
Payer: COMMERCIAL

## 2024-07-22 DIAGNOSIS — Z12.31 VISIT FOR SCREENING MAMMOGRAM: ICD-10-CM

## 2024-07-22 PROCEDURE — 77063 BREAST TOMOSYNTHESIS BI: CPT

## 2024-07-30 ENCOUNTER — APPOINTMENT (OUTPATIENT)
Dept: MEDICAL GROUP | Facility: MEDICAL CENTER | Age: 44
End: 2024-07-30
Payer: COMMERCIAL

## 2024-07-30 VITALS
HEIGHT: 61 IN | OXYGEN SATURATION: 100 % | WEIGHT: 107 LBS | DIASTOLIC BLOOD PRESSURE: 68 MMHG | TEMPERATURE: 97.7 F | SYSTOLIC BLOOD PRESSURE: 110 MMHG | BODY MASS INDEX: 20.2 KG/M2 | HEART RATE: 82 BPM

## 2024-07-30 DIAGNOSIS — F41.9 INSOMNIA SECONDARY TO ANXIETY: ICD-10-CM

## 2024-07-30 DIAGNOSIS — E28.2 PCOS (POLYCYSTIC OVARIAN SYNDROME): ICD-10-CM

## 2024-07-30 DIAGNOSIS — F51.05 INSOMNIA SECONDARY TO ANXIETY: ICD-10-CM

## 2024-07-30 DIAGNOSIS — F41.1 GENERALIZED ANXIETY DISORDER: ICD-10-CM

## 2024-07-30 RX ORDER — METFORMIN HYDROCHLORIDE 500 MG/1
500 TABLET, EXTENDED RELEASE ORAL 2 TIMES DAILY
Qty: 60 TABLET | Refills: 5 | Status: SHIPPED | OUTPATIENT
Start: 2024-07-30

## 2024-07-30 RX ORDER — ALPRAZOLAM 0.5 MG/1
.25-.5 TABLET ORAL NIGHTLY PRN
Qty: 30 TABLET | Refills: 0 | Status: SHIPPED | OUTPATIENT
Start: 2024-07-30 | End: 2024-10-28

## 2024-07-30 ASSESSMENT — FIBROSIS 4 INDEX: FIB4 SCORE: 1.14

## 2024-12-20 ENCOUNTER — APPOINTMENT (OUTPATIENT)
Dept: OBGYN | Facility: CLINIC | Age: 44
End: 2024-12-20
Payer: COMMERCIAL

## 2025-01-23 ENCOUNTER — TELEMEDICINE (OUTPATIENT)
Dept: MEDICAL GROUP | Facility: MEDICAL CENTER | Age: 45
End: 2025-01-23
Payer: COMMERCIAL

## 2025-01-23 VITALS — BODY MASS INDEX: 20.2 KG/M2 | WEIGHT: 107 LBS | HEIGHT: 61 IN

## 2025-01-23 DIAGNOSIS — E28.2 PCOS (POLYCYSTIC OVARIAN SYNDROME): ICD-10-CM

## 2025-01-23 DIAGNOSIS — F41.9 INSOMNIA SECONDARY TO ANXIETY: Primary | ICD-10-CM

## 2025-01-23 DIAGNOSIS — Z00.00 HEALTHCARE MAINTENANCE: ICD-10-CM

## 2025-01-23 DIAGNOSIS — M05.79 RHEUMATOID ARTHRITIS INVOLVING MULTIPLE SITES WITH POSITIVE RHEUMATOID FACTOR (HCC): ICD-10-CM

## 2025-01-23 DIAGNOSIS — E67.3 HYPERVITAMINOSIS D: ICD-10-CM

## 2025-01-23 DIAGNOSIS — F51.05 INSOMNIA SECONDARY TO ANXIETY: Primary | ICD-10-CM

## 2025-01-23 PROCEDURE — 99214 OFFICE O/P EST MOD 30 MIN: CPT | Mod: 95 | Performed by: STUDENT IN AN ORGANIZED HEALTH CARE EDUCATION/TRAINING PROGRAM

## 2025-01-23 RX ORDER — METFORMIN HYDROCHLORIDE 500 MG/1
500 TABLET, EXTENDED RELEASE ORAL 2 TIMES DAILY
Qty: 180 TABLET | Refills: 3 | Status: SHIPPED | OUTPATIENT
Start: 2025-01-23

## 2025-01-23 RX ORDER — ALPRAZOLAM 0.25 MG/1
0.25 TABLET ORAL NIGHTLY PRN
Qty: 30 TABLET | Refills: 0 | Status: SHIPPED | OUTPATIENT
Start: 2025-01-23 | End: 2025-04-23

## 2025-01-23 ASSESSMENT — FIBROSIS 4 INDEX: FIB4 SCORE: 1.162755348299890642

## 2025-01-23 ASSESSMENT — PATIENT HEALTH QUESTIONNAIRE - PHQ9: CLINICAL INTERPRETATION OF PHQ2 SCORE: 0

## 2025-01-23 NOTE — PROGRESS NOTES
Virtual Visit: Established Patient   This visit was conducted via Teams using secure and encrypted videoconferencing technology.   The patient was in a private location outside of their home in the state of [place].    The patient's identity was confirmed and verbal consent was obtained for this virtual visit.    Subjective:   CC:   Chief Complaint   Patient presents with    Medication Refill     Felisha Pedraza is a 44 y.o. female presenting for evaluation and management of:    Patient for follow-up appointment to request for medication refills.  She has diagnosis of PCOS which is stable she has been taking metformin 500 mg with current dose of twice daily.  Tolerated well requesting for refills  Patient is also following up with OB/GYN and has a Pap smear scheduled for next month.  She is also requesting for hormone panel labs to be ordered which she would like to get done along with her other annual labs and would prefer to discuss her hormone panel  results with her OB/GYN during her next appointment.  She does have history of generalized anxiety with intermittent panic episodes.  Reports having panic episodes more common at night when she tries to go to bed.  She has been using Xanax 0.25 mg once nightly as needed.  The last refill was done in July which has lasted her almost 6 months.  Requesting for refill.  She continues to follow-up with psychology for counseling and therapy.    Following up with Dr. Chu rheumatologist and also with pain management for her rheumatoid arthritis and multiple joint pain      ROS   Review of systems (+10 systems) unremarkable except for concerns noted by patient or items listed.    Please see HPI for additional ROS.      Current medicines (including changes today)  Current Outpatient Medications   Medication Sig Dispense Refill    metFORMIN ER (GLUCOPHAGE XR) 500 MG TABLET SR 24 HR Take 1 Tablet by mouth 2 times a day. 180 Tablet 3    ALPRAZolam (XANAX) 0.25 MG Tab Take 1  "Tablet by mouth at bedtime as needed for Sleep or Anxiety for up to 90 days. 30 pills given for a total 90 days supply . To be taken once nightly as needed for anxiety causing insomnia 30 Tablet 0    nitrofurantoin (MACRODANTIN) 50 MG Cap Take 1 capsule as needed by oral route.      Turmeric 400 MG Cap Turmeric      Magnesium Gluconate (MAGNESIUM 27 PO) Magnesium      Buprenorphine HCl-Naloxone HCl 8-2 MG FILM DISSOLVE ONE-HALF FILM UNDER THE TONGUE EVERY DAY      acyclovir (ZOVIRAX) 400 MG tablet       Bimatoprost 0.03 % Solution USE APPLICATION TO APPLY TO EYELASH BASE ONCE A DAY, MAKES LASHES GROW LONGER      tretinoin (RETIN-A) 0.025 % cream APPLY A PEA SIZED AMOUNT TO AFFECTED AREA(S) AT BEDTIME AS DIRECTED      doxylamine (UNISOM SLEEPTABS) 25 MG Tab tablet Take 25 mg by mouth every bedtime.      MAGNESIUM PO Take  by mouth.      Coenzyme Q10 (COQ-10 PO) Take  by mouth.       No current facility-administered medications for this visit.       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  Skin: No rashes in visible areas.  Eye: Round. Conjunctiva clear, lids normal. No icterus.   ENMT: Lips pink without lesions, good dentition, moist mucous membranes. Phonation normal.  Neck: No masses, no thyromegaly. Moves freely without pain.  Respiratory: Unlabored respiratory effort, no cough or audible wheeze  Psych: Alert and " oriented x3, normal affect and mood.     Assessment and Plan:   The following treatment plan was discussed:     1. PCOS (polycystic ovarian syndrome)  Chronic, stable  On metformin 500 mg twice daily  Requesting for medication refill  Plan:  Hormone panel ordered per patient's request but informed patient that results has to be elevated and discussed with her OB/GYN  - metFORMIN ER (GLUCOPHAGE XR) 500 MG TABLET SR 24 HR; Take 1 Tablet by mouth 2 times a day.  Dispense: 180 Tablet; Refill: 3  - 17-OH PROGESTERONE; Future  - PROLACTIN; Future  - TESTOSTERONE SERUM; Future  - ESTRADIOL; Future  - FSH/LH; Future    2. Healthcare maintenance  - Comp Metabolic Panel; Future  - Lipid Profile; Future  - CBC WITHOUT DIFFERENTIAL; Future  - TSH WITH REFLEX TO FT4; Future  - HEMOGLOBIN A1C; Future    3. Hypervitaminosis D  - VITAMIN D,25 HYDROXY (DEFICIENCY); Future    4. Insomnia secondary to anxiety  Chronic ,stable  Some times wakes up in middle of the night when she has panic episodes.   Takes 0.25 mg of xanax as needed which helps if any panic attack.   Not taking on daily basis takes only if needed  Has tolerated well without any side effects  Requesting for refill of medication  Controlled substance agreement on file  PDMP reviewed today 01/23/2025 shows no abnormal prescribing or filling behavior.    The treatment plan was reviewed and discussed with the patient. The pharmacy monitoring report was requested and reviewed.  Patient is appropriate for refill of this medication.    - ALPRAZolam (XANAX) 0.25 MG Tab; Take 1 Tablet by mouth at bedtime as needed for Sleep or Anxiety for up to 90 days. 30 pills given for a total 90 days supply . To be taken once nightly as needed for anxiety causing insomnia  Dispense: 30 Tablet; Refill: 0    5. Rheumatoid arthritis involving multiple sites with positive rheumatoid factor (HCC)  Chronic, stable   Previously on plaquenil but due to side effects, discontinued   Currently managed  with lifestyle changes.  Following up with rheumatology - Dr. Chu      Also Continues following up for pain management  Patient currently on buprenorphine-naloxone combination for pain management for multiple joint pain.  I will defer this treatment to her outside provider      Follow-up: Follow-up in 6 months or as needed

## 2025-02-05 ENCOUNTER — APPOINTMENT (OUTPATIENT)
Dept: MEDICAL GROUP | Facility: MEDICAL CENTER | Age: 45
End: 2025-02-05
Payer: COMMERCIAL

## 2025-02-25 ENCOUNTER — GYNECOLOGY VISIT (OUTPATIENT)
Dept: OBGYN | Facility: CLINIC | Age: 45
End: 2025-02-25
Payer: COMMERCIAL

## 2025-02-25 ENCOUNTER — HOSPITAL ENCOUNTER (OUTPATIENT)
Facility: MEDICAL CENTER | Age: 45
End: 2025-02-25
Attending: OBSTETRICS & GYNECOLOGY
Payer: COMMERCIAL

## 2025-02-25 VITALS — WEIGHT: 112 LBS | DIASTOLIC BLOOD PRESSURE: 65 MMHG | BODY MASS INDEX: 21.16 KG/M2 | SYSTOLIC BLOOD PRESSURE: 128 MMHG

## 2025-02-25 DIAGNOSIS — Z01.419 WOMEN'S ANNUAL ROUTINE GYNECOLOGICAL EXAMINATION: ICD-10-CM

## 2025-02-25 PROCEDURE — 87624 HPV HI-RISK TYP POOLED RSLT: CPT

## 2025-02-25 PROCEDURE — 88142 CYTOPATH C/V THIN LAYER: CPT

## 2025-02-25 RX ORDER — VALACYCLOVIR HYDROCHLORIDE 500 MG/1
500 TABLET, FILM COATED ORAL 2 TIMES DAILY
Qty: 6 TABLET | Refills: 6 | Status: SHIPPED | OUTPATIENT
Start: 2025-02-25

## 2025-02-25 RX ORDER — ACETAMINOPHEN AND CODEINE PHOSPHATE 120; 12 MG/5ML; MG/5ML
1 SOLUTION ORAL DAILY
Qty: 84 TABLET | Refills: 3 | Status: SHIPPED | OUTPATIENT
Start: 2025-02-25

## 2025-02-25 ASSESSMENT — FIBROSIS 4 INDEX: FIB4 SCORE: 1.162755348299890642

## 2025-02-25 NOTE — PROGRESS NOTES
Annual examination;  This patient is a 44 y.o. female      Last mammogram-2024-normal    /65 (BP Location: Right arm, Patient Position: Sitting, BP Cuff Size: Small adult)   Wt 112 lb   LMP 2025 (Exact Date)   BMI 21.16 kg/m²     Physical examination;  Breast examination- No dominant masses, No skin retraction, No axillary adenopathy    Pelvic examination;  External genitalia-No visible lesions, urethra normal in appearance, [place]'s glands normal  Vagina-No blood or discharge, bladder normal in position without gross lesions  Cervix-No gross lesions, Pap smear taken  Uterus-Normal size and shape,  No tenderness  Adnexa No mass or tenderness    Abdomen-nondistended positive bowel sounds soft nontender without masses or hepatosplenomegaly    Impression;  Normal annual    Plan;  Micronor prescription  Check PAP  Continue annual mammogram  Patient has a history of HSV would like episodic treatment valacyclovir 500 mg p.o. twice daily x 3 days with 6 refills given    Female chaperone present during entire history and physical examination

## 2025-02-28 LAB
HPV I/H RISK 1 DNA SPEC QL NAA+PROBE: NOT DETECTED
SPECIMEN SOURCE: NORMAL
THINPREP PAP, CYTOLOGY NL11781: NORMAL

## 2025-03-24 ENCOUNTER — HOSPITAL ENCOUNTER (OUTPATIENT)
Dept: LAB | Facility: MEDICAL CENTER | Age: 45
End: 2025-03-24
Attending: STUDENT IN AN ORGANIZED HEALTH CARE EDUCATION/TRAINING PROGRAM
Payer: COMMERCIAL

## 2025-03-24 DIAGNOSIS — E28.2 PCOS (POLYCYSTIC OVARIAN SYNDROME): ICD-10-CM

## 2025-03-24 DIAGNOSIS — Z00.00 HEALTHCARE MAINTENANCE: ICD-10-CM

## 2025-03-24 DIAGNOSIS — E67.3 HYPERVITAMINOSIS D: ICD-10-CM

## 2025-03-24 LAB
25(OH)D3 SERPL-MCNC: 27 NG/ML (ref 30–100)
ALBUMIN SERPL BCP-MCNC: 4.1 G/DL (ref 3.2–4.9)
ALBUMIN/GLOB SERPL: 1.6 G/DL
ALP SERPL-CCNC: 62 U/L (ref 30–99)
ALT SERPL-CCNC: 15 U/L (ref 2–50)
ANION GAP SERPL CALC-SCNC: 11 MMOL/L (ref 7–16)
AST SERPL-CCNC: 24 U/L (ref 12–45)
BILIRUB SERPL-MCNC: 0.7 MG/DL (ref 0.1–1.5)
BUN SERPL-MCNC: 15 MG/DL (ref 8–22)
CALCIUM ALBUM COR SERPL-MCNC: 9 MG/DL (ref 8.5–10.5)
CALCIUM SERPL-MCNC: 9.1 MG/DL (ref 8.5–10.5)
CHLORIDE SERPL-SCNC: 106 MMOL/L (ref 96–112)
CHOLEST SERPL-MCNC: 178 MG/DL (ref 100–199)
CO2 SERPL-SCNC: 24 MMOL/L (ref 20–33)
CREAT SERPL-MCNC: 0.67 MG/DL (ref 0.5–1.4)
ERYTHROCYTE [DISTWIDTH] IN BLOOD BY AUTOMATED COUNT: 40.9 FL (ref 35.9–50)
EST. AVERAGE GLUCOSE BLD GHB EST-MCNC: 103 MG/DL
ESTRADIOL SERPL-MCNC: 52 PG/ML
FSH SERPL-ACNC: 7.9 MIU/ML
GFR SERPLBLD CREATININE-BSD FMLA CKD-EPI: 111 ML/MIN/1.73 M 2
GLOBULIN SER CALC-MCNC: 2.5 G/DL (ref 1.9–3.5)
GLUCOSE SERPL-MCNC: 83 MG/DL (ref 65–99)
HBA1C MFR BLD: 5.2 % (ref 4–5.6)
HCT VFR BLD AUTO: 37.7 % (ref 37–47)
HDLC SERPL-MCNC: 69 MG/DL
HGB BLD-MCNC: 12.1 G/DL (ref 12–16)
LDLC SERPL CALC-MCNC: 102 MG/DL
LH SERPL-ACNC: 13.2 IU/L
MCH RBC QN AUTO: 27.9 PG (ref 27–33)
MCHC RBC AUTO-ENTMCNC: 32.1 G/DL (ref 32.2–35.5)
MCV RBC AUTO: 87.1 FL (ref 81.4–97.8)
PLATELET # BLD AUTO: 219 K/UL (ref 164–446)
PMV BLD AUTO: 13.1 FL (ref 9–12.9)
POTASSIUM SERPL-SCNC: 3.9 MMOL/L (ref 3.6–5.5)
PROLACTIN SERPL-MCNC: 19.4 NG/ML (ref 2.8–26)
PROT SERPL-MCNC: 6.6 G/DL (ref 6–8.2)
RBC # BLD AUTO: 4.33 M/UL (ref 4.2–5.4)
SODIUM SERPL-SCNC: 141 MMOL/L (ref 135–145)
TRIGL SERPL-MCNC: 35 MG/DL (ref 0–149)
TSH SERPL DL<=0.005 MIU/L-ACNC: 2.11 UIU/ML (ref 0.38–5.33)
WBC # BLD AUTO: 5 K/UL (ref 4.8–10.8)

## 2025-03-24 PROCEDURE — 82670 ASSAY OF TOTAL ESTRADIOL: CPT

## 2025-03-24 PROCEDURE — 85027 COMPLETE CBC AUTOMATED: CPT

## 2025-03-24 PROCEDURE — 84443 ASSAY THYROID STIM HORMONE: CPT

## 2025-03-24 PROCEDURE — 80053 COMPREHEN METABOLIC PANEL: CPT

## 2025-03-24 PROCEDURE — 84146 ASSAY OF PROLACTIN: CPT

## 2025-03-24 PROCEDURE — 36415 COLL VENOUS BLD VENIPUNCTURE: CPT

## 2025-03-24 PROCEDURE — 83036 HEMOGLOBIN GLYCOSYLATED A1C: CPT

## 2025-03-24 PROCEDURE — 83002 ASSAY OF GONADOTROPIN (LH): CPT

## 2025-03-24 PROCEDURE — 82306 VITAMIN D 25 HYDROXY: CPT

## 2025-03-24 PROCEDURE — 80061 LIPID PANEL: CPT

## 2025-03-24 PROCEDURE — 83001 ASSAY OF GONADOTROPIN (FSH): CPT

## 2025-03-26 ENCOUNTER — RESULTS FOLLOW-UP (OUTPATIENT)
Dept: MEDICAL GROUP | Facility: MEDICAL CENTER | Age: 45
End: 2025-03-26
Payer: COMMERCIAL

## 2025-04-09 ENCOUNTER — PATIENT MESSAGE (OUTPATIENT)
Dept: MEDICAL GROUP | Facility: MEDICAL CENTER | Age: 45
End: 2025-04-09
Payer: COMMERCIAL

## 2025-04-10 ENCOUNTER — NURSE TRIAGE (OUTPATIENT)
Dept: HEALTH INFORMATION MANAGEMENT | Facility: OTHER | Age: 45
End: 2025-04-10
Payer: COMMERCIAL

## 2025-04-10 NOTE — TELEPHONE ENCOUNTER
See RN Triage encounter for more information, patient advised to go to Urgent Care now, symptoms of concern not present at the time of our call, ER precautions reviewed. Patient verbalized plans to go to urgent care.

## 2025-04-10 NOTE — TELEPHONE ENCOUNTER
Spoke with patient following MyChart message from patient describing chest pain.     Pt states that she has been having intermittent, subtle twinges in her chest along with some shortness of breath. Pt expressed that she believes it may be anxiety-related. MA had advised the patient to go to the ER, pt states she does not feel her symptoms are concerning enough to go to the ER.     Denies severe chest pain, diaphoresis, history of blood clots in the legs, pain radiating to shoulder, arm or jaw, and states that she does not feel the symptoms at present.     Per protocol, this RN advised the patient to be evaluated in urgent care now. Strict ER precautions reviewed. Patient verbalized understanding and plans to go to urgent care. Patient states no other questions, concerns, or needs at this time.     Reason for Disposition   Intermittent chest pain and pain has been increasing in severity or frequency    Additional Information   Negative: SEVERE difficulty breathing (e.g., struggling for each breath, speaks in single words)   Negative: Passed out (i.e., fainted, collapsed and was not responding)   Negative: Chest pain lasting longer than 5 minutes and ANY of the following:* Over 50 years old* Over 30 years old and at least one cardiac risk factor (i.e., high blood pressure, diabetes, high cholesterol, obesity, smoker or strong family history of heart disease)* Pain is crushing, pressure-like, or heavy * Took nitroglycerin and chest pain was not relieved* History of heart disease (i.e., angina, heart attack, bypass surgery, angioplasty, CHF)   Negative: Visible sweat on face or sweat dripping down face   Negative: Sounds like a life-threatening emergency to the triager   Negative: Followed an injury to chest   Negative: SEVERE chest pain   Negative: Pain also present in shoulder(s) or arm(s) or jaw   Negative: Difficulty breathing   Negative: Cocaine use within last 3 days   Negative: History of prior 'blood clot' in  leg or lungs (i.e., deep vein thrombosis, pulmonary embolism)   Negative: Recent illness requiring prolonged bed rest (i.e., immobilization)   Negative: Hip or leg fracture in past 2 months (e.g, or had cast on leg or ankle)   Negative: Major surgery in the past month   Negative: Recent long-distance travel with prolonged time in car, bus, plane, or train (i.e., within past 2 weeks; 6 or more hours duration)   Negative: Heart beating irregularly or very rapidly   Negative: Chest pain lasting longer than 5 minutes    Protocols used: Chest Pain-A-[place]

## 2025-04-22 ENCOUNTER — APPOINTMENT (OUTPATIENT)
Dept: MEDICAL GROUP | Facility: MEDICAL CENTER | Age: 45
End: 2025-04-22
Payer: COMMERCIAL

## 2025-05-01 ENCOUNTER — APPOINTMENT (OUTPATIENT)
Dept: MEDICAL GROUP | Facility: MEDICAL CENTER | Age: 45
End: 2025-05-01
Payer: COMMERCIAL

## 2025-07-23 ENCOUNTER — HOSPITAL ENCOUNTER (OUTPATIENT)
Dept: RADIOLOGY | Facility: MEDICAL CENTER | Age: 45
End: 2025-07-23
Attending: OBSTETRICS & GYNECOLOGY
Payer: COMMERCIAL

## 2025-07-23 DIAGNOSIS — Z01.419 WOMEN'S ANNUAL ROUTINE GYNECOLOGICAL EXAMINATION: ICD-10-CM

## 2025-07-23 PROCEDURE — 77063 BREAST TOMOSYNTHESIS BI: CPT

## (undated) DEVICE — CATHETER IV NON-SAFETY 18 GA X 1 1/4 (50/BX 4BX/CA)

## (undated) DEVICE — SUTURE 1 CHROMIC GUTCT-1 27 (36PK/BX)"

## (undated) DEVICE — TRAY BLADDER CARE W/ 16 FR FOLEY CATHETER STATLOCK  (10/CA)

## (undated) DEVICE — TRAY SPINAL ANESTHESIA NON-SAFETY (10/CA)

## (undated) DEVICE — TAPE CLOTH MEDIPORE 6 INCH - (12RL/CA)

## (undated) DEVICE — ELECTRODE DUAL RETURN W/ CORD - (50/PK)

## (undated) DEVICE — SLEEVE, SEQUENTIAL CALF REG

## (undated) DEVICE — CLIPS FILSHIE FOR TUBAL - LIGATION SYSTEM (20PR/BX)

## (undated) DEVICE — GLOVE BIOGEL INDICATOR SZ 7SURGICAL PF LTX - (50/BX 4BX/CA)

## (undated) DEVICE — SUTURE 0 VICRYL PLUS CT 36 (36PK/BX)"

## (undated) DEVICE — TUBING CLEARLINK DUO-VENT - C-FLO (48EA/CA)

## (undated) DEVICE — DRESSING NON-ADHERING 8 X 3 - (50/BX)

## (undated) DEVICE — CHLORAPREP 26 ML APPLICATOR - ORANGE TINT(25/CA)

## (undated) DEVICE — SODIUM CHL IRRIGATION 0.9% 1000ML (12EA/CA)

## (undated) DEVICE — HEAD HOLDER JUNIOR/ADULT

## (undated) DEVICE — DETERGENT RENUZYME PLUS 10 OZ PACKET (50/BX)

## (undated) DEVICE — SPONGE GAUZESTER 4 X 4 4PLY - (128PK/CA)

## (undated) DEVICE — KIT  I.V. START (100EA/CA)

## (undated) DEVICE — CANISTER SUCTION 3000ML MECHANICAL FILTER AUTO SHUTOFF MEDI-VAC NONSTERILE LF DISP  (40EA/CA)

## (undated) DEVICE — PACK C-SECTION (2EA/CA)

## (undated) DEVICE — GLOVE BIOGEL SZ 7 SURGICAL PF LTX - (50PR/BX 4BX/CA)

## (undated) DEVICE — STAPLER SKIN DISP - (6/BX 10BX/CA) VISISTAT

## (undated) DEVICE — WATER IRRIG. STER. 1500 ML - (9/CA)

## (undated) DEVICE — BLANKET UNDERBODY FULL ACCES - (5/CA)

## (undated) DEVICE — SET EXTENSION WITH 2 PORTS (48EA/CA) ***PART #2C8610 IS A SUBSTITUTE*****

## (undated) DEVICE — SHEATH RO 4F 10CM (10EA/BX)